# Patient Record
Sex: FEMALE | Race: WHITE | Employment: OTHER | URBAN - METROPOLITAN AREA
[De-identification: names, ages, dates, MRNs, and addresses within clinical notes are randomized per-mention and may not be internally consistent; named-entity substitution may affect disease eponyms.]

---

## 2017-04-04 ENCOUNTER — OFFICE VISIT (OUTPATIENT)
Dept: SURGERY | Age: 77
End: 2017-04-04

## 2017-04-04 VITALS
HEIGHT: 60 IN | TEMPERATURE: 97.9 F | RESPIRATION RATE: 14 BRPM | DIASTOLIC BLOOD PRESSURE: 57 MMHG | WEIGHT: 172 LBS | SYSTOLIC BLOOD PRESSURE: 113 MMHG | OXYGEN SATURATION: 96 % | BODY MASS INDEX: 33.77 KG/M2 | HEART RATE: 68 BPM

## 2017-04-04 DIAGNOSIS — K42.0 RECURRENT UMBILICAL HERNIA WITH INCARCERATION: Primary | ICD-10-CM

## 2017-04-04 PROBLEM — F41.9 ANXIETY: Status: RESOLVED | Noted: 2017-04-04 | Resolved: 2017-04-04

## 2017-04-04 PROBLEM — I10 HTN (HYPERTENSION): Status: ACTIVE | Noted: 2017-04-04

## 2017-04-04 PROBLEM — E03.9 HYPOTHYROID: Status: ACTIVE | Noted: 2017-04-04

## 2017-04-04 PROBLEM — K21.9 GERD (GASTROESOPHAGEAL REFLUX DISEASE): Status: ACTIVE | Noted: 2017-04-04

## 2017-04-04 PROBLEM — E11.9 TYPE II DIABETES MELLITUS (HCC): Status: ACTIVE | Noted: 2017-04-04

## 2017-04-04 PROBLEM — F41.9 ANXIETY: Status: ACTIVE | Noted: 2017-04-04

## 2017-04-04 PROBLEM — E66.9 OBESITY (BMI 30.0-34.9): Status: ACTIVE | Noted: 2017-04-04

## 2017-04-04 PROBLEM — E78.00 HYPERCHOLESTEREMIA: Status: ACTIVE | Noted: 2017-04-04

## 2017-04-04 RX ORDER — ATENOLOL 50 MG/1
TABLET ORAL DAILY
COMMUNITY
End: 2017-04-11

## 2017-04-04 RX ORDER — GLIPIZIDE 10 MG/1
10 TABLET ORAL 2 TIMES DAILY
COMMUNITY
End: 2020-08-24

## 2017-04-04 RX ORDER — SIMVASTATIN 20 MG/1
TABLET, FILM COATED ORAL
COMMUNITY
End: 2017-04-11

## 2017-04-04 RX ORDER — ALPRAZOLAM 0.25 MG/1
TABLET ORAL
COMMUNITY
End: 2017-04-11

## 2017-04-04 RX ORDER — LISINOPRIL 5 MG/1
TABLET ORAL DAILY
COMMUNITY
End: 2017-04-11

## 2017-04-04 RX ORDER — ALLOPURINOL 100 MG/1
TABLET ORAL DAILY
COMMUNITY
End: 2017-04-11

## 2017-04-04 RX ORDER — LEVOTHYROXINE SODIUM 50 UG/1
TABLET ORAL
COMMUNITY
End: 2017-04-11

## 2017-04-04 NOTE — PROGRESS NOTES
Surgery History and Physical    Subjective:      Zaheer Hector is a 66 y.o. white female who presents for evaluation of a hernia. Mrs. Darrian Jang had an open umbilical herniorrhaphy in the past, and she believes that mesh was used. For the past 3 to 5 years, she has had a recurrent hernia. Over the past few weeks, she has had increasing sharp pain, especially after she eats. She has had some nausea recently, but no vomiting. She is moving her bowels normally. The hernia always sticks out, but gets bigger at times. It never completely reduces. Her only other abdominal surgery was an open cholecystectomy. Past Medical History:   Diagnosis Date    Anxiety     Arthritis     Gout.  Diabetes ([facility] [address]     Hypercholesterolemia     Hypertension     Thyroid disease     Hypothyroid. No past surgical history on file. No family history on file. Social History   Substance Use Topics    Smoking status: Not on file    Smokeless tobacco: Not on file    Alcohol use Not on file      Prior to Admission medications    Medication Sig Start Date End Date Taking? Authorizing Provider   lisinopril (PRINIVIL, ZESTRIL) 5 mg tablet Take  by mouth daily. Yes Historical Provider   atenolol (TENORMIN) 50 mg tablet Take  by mouth daily. Yes Historical Provider   glipiZIDE (GLUCOTROL) 10 mg tablet Take 10 mg by mouth two (2) times a day. Yes Historical Provider   SITagliptin (JANUVIA) 50 mg tablet Take 50 mg by mouth daily. Yes Historical Provider   simvastatin (ZOCOR) 20 mg tablet Take  by mouth nightly. Yes Historical Provider   levothyroxine (SYNTHROID) 50 mcg tablet Take  by mouth Daily (before breakfast). Yes Historical Provider   allopurinol (ZYLOPRIM) 100 mg tablet Take  by mouth daily. Yes Historical Provider   ALPRAZolam (XANAX) 0.25 mg tablet Take  by mouth.    Yes Historical Provider      Not on File    Review of Systems:  A comprehensive review of systems was negative except for that written in the History of Present Illness. Objective:      Physical Exam:  GENERAL: alert, cooperative, no distress, appears stated age, EYE: negative findings: anicteric sclera, LYMPHATIC: Cervical, supraclavicular nodes normal. , THROAT & NECK: neck supple and symmetrical.  The thyroid is grossly normal., LUNG: clear to auscultation bilaterally, HEART: regular rate and rhythm, ABDOMEN: Soft, obese, ND. There is a tender incarcerated umbilical hernia. There is a healed infraumbilical surgical scar., EXTREMITIES:  no edema, SKIN: Normal., NEUROLOGIC: negative, PSYCHIATRIC: non focal    Assessment:     Incarcerated, recurrent umbilical hernia without gangrene or obstruction. Plan:     Mrs. Judge Melgar would like to have her hernia repaired, but wants to do it after her cataract procedure next week. I discussed the risks of the procedure including bleeding, infection, wound healing problems, injury to the bowel, recurrence of the hernia, and seroma. She understands the risks; any and all questions were answered to her satisfaction. Mrs. Judge Melgar will be scheduled for an elective robotic-assisted laparoscopic umbilical herniorrhaphy with mesh under general anesthesia. She will be admitted overnight for observation.     Signed By: Viki Novak MD     April 4, 2017

## 2017-04-05 ENCOUNTER — TELEPHONE (OUTPATIENT)
Dept: SURGERY | Age: 77
End: 2017-04-05

## 2017-04-05 NOTE — TELEPHONE ENCOUNTER
Spoke to patient to confirm she is going to have eye surgery tomorrow and she will schedule her surgery with Dr Sam Ramesh for 4/13/17.

## 2017-04-05 NOTE — TELEPHONE ENCOUNTER
Patient said she would like to have her surgery on 4/13/17 she will cancel her eye surgery scheduled for next week. She is going to check with the surgeon to see if its ok for her to have eye surgery tomorrow.  Dr Ezio Sanders notified

## 2017-04-05 NOTE — TELEPHONE ENCOUNTER
Patient called office states she  was in the office yesterday with Dr Renée Moran she would like to schedule her surgery as soon as possible because she is in a lot of pain, she doesn't want to wait

## 2017-04-11 ENCOUNTER — HOSPITAL ENCOUNTER (OUTPATIENT)
Dept: PREADMISSION TESTING | Age: 77
Discharge: HOME | End: 2017-04-11
Payer: COMMERCIAL

## 2017-04-11 VITALS
HEART RATE: 71 BPM | OXYGEN SATURATION: 96 % | RESPIRATION RATE: 16 BRPM | WEIGHT: 173.06 LBS | TEMPERATURE: 98 F | DIASTOLIC BLOOD PRESSURE: 66 MMHG | SYSTOLIC BLOOD PRESSURE: 122 MMHG | BODY MASS INDEX: 33.98 KG/M2 | HEIGHT: 60 IN

## 2017-04-11 LAB
ANION GAP BLD CALC-SCNC: 12 MMOL/L (ref 5–15)
ATRIAL RATE: 65 BPM
BASOPHILS # BLD AUTO: 0 K/UL (ref 0–0.1)
BASOPHILS # BLD: 1 % (ref 0–1)
BUN SERPL-MCNC: 27 MG/DL (ref 6–20)
BUN/CREAT SERPL: 22 (ref 12–20)
CALCIUM SERPL-MCNC: 9.3 MG/DL (ref 8.5–10.1)
CALCULATED P AXIS, ECG09: 42 DEGREES
CALCULATED R AXIS, ECG10: 41 DEGREES
CALCULATED T AXIS, ECG11: 56 DEGREES
CHLORIDE SERPL-SCNC: 105 MMOL/L (ref 97–108)
CO2 SERPL-SCNC: 26 MMOL/L (ref 21–32)
CREAT SERPL-MCNC: 1.23 MG/DL (ref 0.55–1.02)
DIAGNOSIS, 93000: NORMAL
EOSINOPHIL # BLD: 0.3 K/UL (ref 0–0.4)
EOSINOPHIL NFR BLD: 5 % (ref 0–7)
ERYTHROCYTE [DISTWIDTH] IN BLOOD BY AUTOMATED COUNT: 14.4 % (ref 11.5–14.5)
GLUCOSE SERPL-MCNC: 141 MG/DL (ref 65–100)
HCT VFR BLD AUTO: 35.6 % (ref 35–47)
HGB BLD-MCNC: 11.2 G/DL (ref 11.5–16)
LYMPHOCYTES # BLD AUTO: 28 % (ref 12–49)
LYMPHOCYTES # BLD: 2 K/UL (ref 0.8–3.5)
MCH RBC QN AUTO: 31.6 PG (ref 26–34)
MCHC RBC AUTO-ENTMCNC: 31.5 G/DL (ref 30–36.5)
MCV RBC AUTO: 100.6 FL (ref 80–99)
MONOCYTES # BLD: 0.7 K/UL (ref 0–1)
MONOCYTES NFR BLD AUTO: 10 % (ref 5–13)
NEUTS SEG # BLD: 4 K/UL (ref 1.8–8)
NEUTS SEG NFR BLD AUTO: 56 % (ref 32–75)
P-R INTERVAL, ECG05: 178 MS
PLATELET # BLD AUTO: 228 K/UL (ref 150–400)
POTASSIUM SERPL-SCNC: 4.9 MMOL/L (ref 3.5–5.1)
Q-T INTERVAL, ECG07: 404 MS
QRS DURATION, ECG06: 76 MS
QTC CALCULATION (BEZET), ECG08: 420 MS
RBC # BLD AUTO: 3.54 M/UL (ref 3.8–5.2)
SODIUM SERPL-SCNC: 143 MMOL/L (ref 136–145)
VENTRICULAR RATE, ECG03: 65 BPM
WBC # BLD AUTO: 7.2 K/UL (ref 3.6–11)

## 2017-04-11 PROCEDURE — 85025 COMPLETE CBC W/AUTO DIFF WBC: CPT | Performed by: SURGERY

## 2017-04-11 PROCEDURE — 36415 COLL VENOUS BLD VENIPUNCTURE: CPT | Performed by: SURGERY

## 2017-04-11 PROCEDURE — 93005 ELECTROCARDIOGRAM TRACING: CPT

## 2017-04-11 PROCEDURE — 80048 BASIC METABOLIC PNL TOTAL CA: CPT | Performed by: SURGERY

## 2017-04-11 RX ORDER — FAMOTIDINE 10 MG/1
10 TABLET ORAL
COMMUNITY
End: 2021-02-08

## 2017-04-11 RX ORDER — ASPIRIN 81 MG/1
81 TABLET ORAL
Status: ON HOLD | COMMUNITY
End: 2021-02-10 | Stop reason: SDUPTHER

## 2017-04-11 RX ORDER — OMEPRAZOLE 20 MG/1
20 CAPSULE, DELAYED RELEASE ORAL DAILY
COMMUNITY
End: 2021-02-08

## 2017-04-11 RX ORDER — NAPROXEN 500 MG/1
500 TABLET ORAL AS NEEDED
COMMUNITY
End: 2021-02-08

## 2017-04-11 RX ORDER — PREDNISOLONE ACETATE 10 MG/ML
1 SUSPENSION/ DROPS OPHTHALMIC 4 TIMES DAILY
COMMUNITY
End: 2020-08-19

## 2017-04-12 ENCOUNTER — ANESTHESIA EVENT (OUTPATIENT)
Dept: SURGERY | Age: 77
End: 2017-04-12
Payer: COMMERCIAL

## 2017-04-13 ENCOUNTER — SURGERY (OUTPATIENT)
Age: 77
End: 2017-04-13

## 2017-04-13 ENCOUNTER — HOSPITAL ENCOUNTER (OUTPATIENT)
Age: 77
Setting detail: OBSERVATION
Discharge: HOME | End: 2017-04-14
Attending: SURGERY | Admitting: SURGERY
Payer: COMMERCIAL

## 2017-04-13 ENCOUNTER — ANESTHESIA (OUTPATIENT)
Dept: SURGERY | Age: 77
End: 2017-04-13
Payer: COMMERCIAL

## 2017-04-13 DIAGNOSIS — Z98.890 S/P LAPAROSCOPIC HERNIA REPAIR: ICD-10-CM

## 2017-04-13 DIAGNOSIS — Z87.19 S/P LAPAROSCOPIC HERNIA REPAIR: ICD-10-CM

## 2017-04-13 DIAGNOSIS — K42.0 RECURRENT UMBILICAL HERNIA WITH INCARCERATION: ICD-10-CM

## 2017-04-13 LAB
GLUCOSE BLD STRIP.AUTO-MCNC: 138 MG/DL (ref 65–100)
GLUCOSE BLD STRIP.AUTO-MCNC: 145 MG/DL (ref 65–100)
SERVICE CMNT-IMP: ABNORMAL
SERVICE CMNT-IMP: ABNORMAL

## 2017-04-13 PROCEDURE — 77030003580 HC NDL INSUF VERES J&J -B: Performed by: SURGERY

## 2017-04-13 PROCEDURE — 76010000876 HC OR TIME 2 TO 2.5HR INTENSV - TIER 2: Performed by: SURGERY

## 2017-04-13 PROCEDURE — 74011000258 HC RX REV CODE- 258: Performed by: SURGERY

## 2017-04-13 PROCEDURE — 77030026438 HC STYL ET INTUB CARD -A: Performed by: ANESTHESIOLOGY

## 2017-04-13 PROCEDURE — 74011250637 HC RX REV CODE- 250/637: Performed by: SURGERY

## 2017-04-13 PROCEDURE — 77030016151 HC PROTCTR LNS DFOG COVD -B: Performed by: SURGERY

## 2017-04-13 PROCEDURE — 76060000035 HC ANESTHESIA 2 TO 2.5 HR: Performed by: SURGERY

## 2017-04-13 PROCEDURE — 76210000016 HC OR PH I REC 1 TO 1.5 HR: Performed by: SURGERY

## 2017-04-13 PROCEDURE — 77030031139 HC SUT VCRL2 J&J -A: Performed by: SURGERY

## 2017-04-13 PROCEDURE — 77030032490 HC SLV COMPR SCD KNE COVD -B: Performed by: SURGERY

## 2017-04-13 PROCEDURE — 77030018836 HC SOL IRR NACL ICUM -A: Performed by: SURGERY

## 2017-04-13 PROCEDURE — C1781 MESH (IMPLANTABLE): HCPCS | Performed by: SURGERY

## 2017-04-13 PROCEDURE — 74011000250 HC RX REV CODE- 250: Performed by: SURGERY

## 2017-04-13 PROCEDURE — 77030013474 HC CRD BPLR DISP ADLR -A: Performed by: SURGERY

## 2017-04-13 PROCEDURE — 77030011640 HC PAD GRND REM COVD -A: Performed by: SURGERY

## 2017-04-13 PROCEDURE — 74011250636 HC RX REV CODE- 250/636

## 2017-04-13 PROCEDURE — 77030013079 HC BLNKT BAIR HGGR 3M -A: Performed by: ANESTHESIOLOGY

## 2017-04-13 PROCEDURE — 77030033067 HC SUT PDO STRATFX SPIR J&J -B: Performed by: SURGERY

## 2017-04-13 PROCEDURE — 77030035277 HC OBTRTR BLDELSS DISP INTU -B: Performed by: SURGERY

## 2017-04-13 PROCEDURE — 99218 HC RM OBSERVATION: CPT

## 2017-04-13 PROCEDURE — 77030008684 HC TU ET CUF COVD -B: Performed by: ANESTHESIOLOGY

## 2017-04-13 PROCEDURE — 88304 TISSUE EXAM BY PATHOLOGIST: CPT | Performed by: SURGERY

## 2017-04-13 PROCEDURE — 74011250636 HC RX REV CODE- 250/636: Performed by: SURGERY

## 2017-04-13 PROCEDURE — 77030018673: Performed by: SURGERY

## 2017-04-13 PROCEDURE — 82962 GLUCOSE BLOOD TEST: CPT

## 2017-04-13 PROCEDURE — 74011250636 HC RX REV CODE- 250/636: Performed by: ANESTHESIOLOGY

## 2017-04-13 PROCEDURE — 77030002933 HC SUT MCRYL J&J -A: Performed by: SURGERY

## 2017-04-13 PROCEDURE — 74011000250 HC RX REV CODE- 250

## 2017-04-13 PROCEDURE — 77010033678 HC OXYGEN DAILY

## 2017-04-13 PROCEDURE — 77030020782 HC GWN BAIR PAWS FLX 3M -B

## 2017-04-13 PROCEDURE — 77030008557 HC TBNG SMK EVAC STOR -B: Performed by: SURGERY

## 2017-04-13 PROCEDURE — 77030037032 HC INSRT SCIS CLICKLLINE DISP STOR -B: Performed by: SURGERY

## 2017-04-13 PROCEDURE — 77030036554: Performed by: SURGERY

## 2017-04-13 PROCEDURE — 77030035048 HC TRCR ENDOSC OPTCL COVD -B: Performed by: SURGERY

## 2017-04-13 PROCEDURE — C9290 INJ, BUPIVACAINE LIPOSOME: HCPCS | Performed by: SURGERY

## 2017-04-13 DEVICE — MESH HERN CIRCLE 4.5IN -- VENTRALIGHT S/T: Type: IMPLANTABLE DEVICE | Site: UMBILICAL | Status: FUNCTIONAL

## 2017-04-13 RX ORDER — NEOSTIGMINE METHYLSULFATE 1 MG/ML
INJECTION INTRAVENOUS AS NEEDED
Status: DISCONTINUED | OUTPATIENT
Start: 2017-04-13 | End: 2017-04-13 | Stop reason: HOSPADM

## 2017-04-13 RX ORDER — ROCURONIUM BROMIDE 10 MG/ML
INJECTION, SOLUTION INTRAVENOUS AS NEEDED
Status: DISCONTINUED | OUTPATIENT
Start: 2017-04-13 | End: 2017-04-13 | Stop reason: HOSPADM

## 2017-04-13 RX ORDER — SODIUM CHLORIDE 0.9 % (FLUSH) 0.9 %
5-10 SYRINGE (ML) INJECTION EVERY 8 HOURS
Status: DISCONTINUED | OUTPATIENT
Start: 2017-04-13 | End: 2017-04-14 | Stop reason: HOSPADM

## 2017-04-13 RX ORDER — ALPRAZOLAM 0.25 MG/1
0.25 TABLET ORAL
Status: DISCONTINUED | OUTPATIENT
Start: 2017-04-13 | End: 2017-04-14 | Stop reason: HOSPADM

## 2017-04-13 RX ORDER — NALOXONE HYDROCHLORIDE 0.4 MG/ML
0.4 INJECTION, SOLUTION INTRAMUSCULAR; INTRAVENOUS; SUBCUTANEOUS AS NEEDED
Status: DISCONTINUED | OUTPATIENT
Start: 2017-04-13 | End: 2017-04-14 | Stop reason: HOSPADM

## 2017-04-13 RX ORDER — ATENOLOL 25 MG/1
50 TABLET ORAL DAILY
Status: DISCONTINUED | OUTPATIENT
Start: 2017-04-14 | End: 2017-04-14 | Stop reason: HOSPADM

## 2017-04-13 RX ORDER — FENTANYL CITRATE 50 UG/ML
INJECTION, SOLUTION INTRAMUSCULAR; INTRAVENOUS AS NEEDED
Status: DISCONTINUED | OUTPATIENT
Start: 2017-04-13 | End: 2017-04-13 | Stop reason: HOSPADM

## 2017-04-13 RX ORDER — SODIUM CHLORIDE, SODIUM LACTATE, POTASSIUM CHLORIDE, CALCIUM CHLORIDE 600; 310; 30; 20 MG/100ML; MG/100ML; MG/100ML; MG/100ML
75 INJECTION, SOLUTION INTRAVENOUS CONTINUOUS
Status: DISCONTINUED | OUTPATIENT
Start: 2017-04-13 | End: 2017-04-14 | Stop reason: HOSPADM

## 2017-04-13 RX ORDER — PREDNISOLONE ACETATE 10 MG/ML
1 SUSPENSION/ DROPS OPHTHALMIC 4 TIMES DAILY
Status: DISCONTINUED | OUTPATIENT
Start: 2017-04-13 | End: 2017-04-14 | Stop reason: HOSPADM

## 2017-04-13 RX ORDER — SODIUM CHLORIDE, SODIUM LACTATE, POTASSIUM CHLORIDE, CALCIUM CHLORIDE 600; 310; 30; 20 MG/100ML; MG/100ML; MG/100ML; MG/100ML
125 INJECTION, SOLUTION INTRAVENOUS CONTINUOUS
Status: DISCONTINUED | OUTPATIENT
Start: 2017-04-13 | End: 2017-04-13 | Stop reason: HOSPADM

## 2017-04-13 RX ORDER — LIDOCAINE HYDROCHLORIDE 20 MG/ML
INJECTION, SOLUTION EPIDURAL; INFILTRATION; INTRACAUDAL; PERINEURAL AS NEEDED
Status: DISCONTINUED | OUTPATIENT
Start: 2017-04-13 | End: 2017-04-13 | Stop reason: HOSPADM

## 2017-04-13 RX ORDER — DIPHENHYDRAMINE HYDROCHLORIDE 50 MG/ML
12.5 INJECTION, SOLUTION INTRAMUSCULAR; INTRAVENOUS AS NEEDED
Status: DISCONTINUED | OUTPATIENT
Start: 2017-04-13 | End: 2017-04-13 | Stop reason: HOSPADM

## 2017-04-13 RX ORDER — SODIUM CHLORIDE, SODIUM LACTATE, POTASSIUM CHLORIDE, CALCIUM CHLORIDE 600; 310; 30; 20 MG/100ML; MG/100ML; MG/100ML; MG/100ML
100 INJECTION, SOLUTION INTRAVENOUS CONTINUOUS
Status: DISCONTINUED | OUTPATIENT
Start: 2017-04-13 | End: 2017-04-13 | Stop reason: HOSPADM

## 2017-04-13 RX ORDER — ONDANSETRON 2 MG/ML
4 INJECTION INTRAMUSCULAR; INTRAVENOUS
Status: DISCONTINUED | OUTPATIENT
Start: 2017-04-13 | End: 2017-04-14 | Stop reason: HOSPADM

## 2017-04-13 RX ORDER — GLIPIZIDE 5 MG/1
10 TABLET ORAL 2 TIMES DAILY WITH MEALS
Status: DISCONTINUED | OUTPATIENT
Start: 2017-04-14 | End: 2017-04-14 | Stop reason: HOSPADM

## 2017-04-13 RX ORDER — CEFAZOLIN SODIUM IN 0.9 % NACL 2 G/50 ML
2 INTRAVENOUS SOLUTION, PIGGYBACK (ML) INTRAVENOUS ONCE
Status: DISCONTINUED | OUTPATIENT
Start: 2017-04-13 | End: 2017-04-13 | Stop reason: HOSPADM

## 2017-04-13 RX ORDER — OXYCODONE AND ACETAMINOPHEN 5; 325 MG/1; MG/1
1 TABLET ORAL
Status: DISCONTINUED | OUTPATIENT
Start: 2017-04-13 | End: 2017-04-14 | Stop reason: HOSPADM

## 2017-04-13 RX ORDER — SODIUM CHLORIDE 0.9 % (FLUSH) 0.9 %
5-10 SYRINGE (ML) INJECTION AS NEEDED
Status: DISCONTINUED | OUTPATIENT
Start: 2017-04-13 | End: 2017-04-14 | Stop reason: HOSPADM

## 2017-04-13 RX ORDER — SODIUM CHLORIDE 0.9 % (FLUSH) 0.9 %
5-10 SYRINGE (ML) INJECTION EVERY 8 HOURS
Status: DISCONTINUED | OUTPATIENT
Start: 2017-04-13 | End: 2017-04-13 | Stop reason: HOSPADM

## 2017-04-13 RX ORDER — FAMOTIDINE 10 MG/ML
INJECTION INTRAVENOUS AS NEEDED
Status: DISCONTINUED | OUTPATIENT
Start: 2017-04-13 | End: 2017-04-13 | Stop reason: HOSPADM

## 2017-04-13 RX ORDER — BUPIVACAINE HYDROCHLORIDE 5 MG/ML
INJECTION, SOLUTION EPIDURAL; INTRACAUDAL AS NEEDED
Status: DISCONTINUED | OUTPATIENT
Start: 2017-04-13 | End: 2017-04-13 | Stop reason: HOSPADM

## 2017-04-13 RX ORDER — ASPIRIN 81 MG/1
81 TABLET ORAL DAILY
Status: DISCONTINUED | OUTPATIENT
Start: 2017-04-14 | End: 2017-04-14 | Stop reason: HOSPADM

## 2017-04-13 RX ORDER — GLYCOPYRROLATE 0.2 MG/ML
INJECTION INTRAMUSCULAR; INTRAVENOUS AS NEEDED
Status: DISCONTINUED | OUTPATIENT
Start: 2017-04-13 | End: 2017-04-13 | Stop reason: HOSPADM

## 2017-04-13 RX ORDER — SODIUM CHLORIDE, SODIUM LACTATE, POTASSIUM CHLORIDE, CALCIUM CHLORIDE 600; 310; 30; 20 MG/100ML; MG/100ML; MG/100ML; MG/100ML
INJECTION, SOLUTION INTRAVENOUS
Status: DISCONTINUED | OUTPATIENT
Start: 2017-04-13 | End: 2017-04-13 | Stop reason: HOSPADM

## 2017-04-13 RX ORDER — ONDANSETRON 2 MG/ML
INJECTION INTRAMUSCULAR; INTRAVENOUS AS NEEDED
Status: DISCONTINUED | OUTPATIENT
Start: 2017-04-13 | End: 2017-04-13 | Stop reason: HOSPADM

## 2017-04-13 RX ORDER — CEFAZOLIN SODIUM 1 G/3ML
INJECTION, POWDER, FOR SOLUTION INTRAMUSCULAR; INTRAVENOUS AS NEEDED
Status: DISCONTINUED | OUTPATIENT
Start: 2017-04-13 | End: 2017-04-13 | Stop reason: HOSPADM

## 2017-04-13 RX ORDER — ONDANSETRON 2 MG/ML
4 INJECTION INTRAMUSCULAR; INTRAVENOUS AS NEEDED
Status: DISCONTINUED | OUTPATIENT
Start: 2017-04-13 | End: 2017-04-13 | Stop reason: HOSPADM

## 2017-04-13 RX ORDER — PROPOFOL 10 MG/ML
INJECTION, EMULSION INTRAVENOUS AS NEEDED
Status: DISCONTINUED | OUTPATIENT
Start: 2017-04-13 | End: 2017-04-13 | Stop reason: HOSPADM

## 2017-04-13 RX ORDER — LIDOCAINE HYDROCHLORIDE 10 MG/ML
0.1 INJECTION, SOLUTION EPIDURAL; INFILTRATION; INTRACAUDAL; PERINEURAL AS NEEDED
Status: DISCONTINUED | OUTPATIENT
Start: 2017-04-13 | End: 2017-04-13 | Stop reason: HOSPADM

## 2017-04-13 RX ORDER — ENOXAPARIN SODIUM 100 MG/ML
40 INJECTION SUBCUTANEOUS EVERY 24 HOURS
Status: DISCONTINUED | OUTPATIENT
Start: 2017-04-13 | End: 2017-04-14 | Stop reason: HOSPADM

## 2017-04-13 RX ORDER — SODIUM CHLORIDE 0.9 % (FLUSH) 0.9 %
5-10 SYRINGE (ML) INJECTION AS NEEDED
Status: DISCONTINUED | OUTPATIENT
Start: 2017-04-13 | End: 2017-04-13 | Stop reason: HOSPADM

## 2017-04-13 RX ORDER — LISINOPRIL 5 MG/1
5 TABLET ORAL
Status: DISCONTINUED | OUTPATIENT
Start: 2017-04-13 | End: 2017-04-14 | Stop reason: HOSPADM

## 2017-04-13 RX ORDER — HYDROMORPHONE HYDROCHLORIDE 2 MG/ML
INJECTION, SOLUTION INTRAMUSCULAR; INTRAVENOUS; SUBCUTANEOUS AS NEEDED
Status: DISCONTINUED | OUTPATIENT
Start: 2017-04-13 | End: 2017-04-13 | Stop reason: HOSPADM

## 2017-04-13 RX ORDER — THERA TABS 400 MCG
1 TAB ORAL DAILY
Status: DISCONTINUED | OUTPATIENT
Start: 2017-04-14 | End: 2017-04-14 | Stop reason: HOSPADM

## 2017-04-13 RX ORDER — HYDROMORPHONE HYDROCHLORIDE 1 MG/ML
1 INJECTION, SOLUTION INTRAMUSCULAR; INTRAVENOUS; SUBCUTANEOUS
Status: DISCONTINUED | OUTPATIENT
Start: 2017-04-13 | End: 2017-04-14 | Stop reason: HOSPADM

## 2017-04-13 RX ORDER — SIMVASTATIN 20 MG/1
20 TABLET, FILM COATED ORAL
Status: DISCONTINUED | OUTPATIENT
Start: 2017-04-13 | End: 2017-04-14 | Stop reason: HOSPADM

## 2017-04-13 RX ORDER — ALLOPURINOL 100 MG/1
200 TABLET ORAL DAILY
Status: DISCONTINUED | OUTPATIENT
Start: 2017-04-14 | End: 2017-04-14 | Stop reason: HOSPADM

## 2017-04-13 RX ORDER — FENTANYL CITRATE 50 UG/ML
INJECTION, SOLUTION INTRAMUSCULAR; INTRAVENOUS AS NEEDED
Status: DISCONTINUED | OUTPATIENT
Start: 2017-04-13 | End: 2017-04-13

## 2017-04-13 RX ORDER — LEVOTHYROXINE SODIUM 50 UG/1
50 TABLET ORAL
Status: DISCONTINUED | OUTPATIENT
Start: 2017-04-14 | End: 2017-04-14 | Stop reason: HOSPADM

## 2017-04-13 RX ORDER — FAMOTIDINE 20 MG/1
10 TABLET, FILM COATED ORAL
Status: DISCONTINUED | OUTPATIENT
Start: 2017-04-13 | End: 2017-04-14 | Stop reason: HOSPADM

## 2017-04-13 RX ORDER — MIDAZOLAM HYDROCHLORIDE 1 MG/ML
INJECTION, SOLUTION INTRAMUSCULAR; INTRAVENOUS AS NEEDED
Status: DISCONTINUED | OUTPATIENT
Start: 2017-04-13 | End: 2017-04-13 | Stop reason: HOSPADM

## 2017-04-13 RX ORDER — ACETAMINOPHEN 325 MG/1
650 TABLET ORAL
Status: DISCONTINUED | OUTPATIENT
Start: 2017-04-13 | End: 2017-04-14 | Stop reason: HOSPADM

## 2017-04-13 RX ORDER — HYDROMORPHONE HYDROCHLORIDE 1 MG/ML
.25-1 INJECTION, SOLUTION INTRAMUSCULAR; INTRAVENOUS; SUBCUTANEOUS
Status: DISCONTINUED | OUTPATIENT
Start: 2017-04-13 | End: 2017-04-13 | Stop reason: HOSPADM

## 2017-04-13 RX ADMIN — GLYCOPYRROLATE 0.4 MG: 0.2 INJECTION INTRAMUSCULAR; INTRAVENOUS at 15:53

## 2017-04-13 RX ADMIN — PROPOFOL 140 MG: 10 INJECTION, EMULSION INTRAVENOUS at 14:07

## 2017-04-13 RX ADMIN — Medication 10 ML: at 21:43

## 2017-04-13 RX ADMIN — LIDOCAINE HYDROCHLORIDE 100 MG: 20 INJECTION, SOLUTION EPIDURAL; INFILTRATION; INTRACAUDAL; PERINEURAL at 14:07

## 2017-04-13 RX ADMIN — ROCURONIUM BROMIDE 35 MG: 10 INJECTION, SOLUTION INTRAVENOUS at 14:08

## 2017-04-13 RX ADMIN — SODIUM CHLORIDE, SODIUM LACTATE, POTASSIUM CHLORIDE, AND CALCIUM CHLORIDE: 600; 310; 30; 20 INJECTION, SOLUTION INTRAVENOUS at 14:32

## 2017-04-13 RX ADMIN — HYDROMORPHONE HYDROCHLORIDE 0.5 MG: 2 INJECTION, SOLUTION INTRAMUSCULAR; INTRAVENOUS; SUBCUTANEOUS at 15:58

## 2017-04-13 RX ADMIN — ENOXAPARIN SODIUM 40 MG: 40 INJECTION SUBCUTANEOUS at 21:43

## 2017-04-13 RX ADMIN — FENTANYL CITRATE 150 MCG: 50 INJECTION, SOLUTION INTRAMUSCULAR; INTRAVENOUS at 14:07

## 2017-04-13 RX ADMIN — BUPIVACAINE 30 ML: 13.3 INJECTION, SUSPENSION, LIPOSOMAL INFILTRATION at 15:51

## 2017-04-13 RX ADMIN — MIDAZOLAM HYDROCHLORIDE 2 MG: 1 INJECTION, SOLUTION INTRAMUSCULAR; INTRAVENOUS at 13:58

## 2017-04-13 RX ADMIN — HYDROMORPHONE HYDROCHLORIDE 0.5 MG: 1 INJECTION, SOLUTION INTRAMUSCULAR; INTRAVENOUS; SUBCUTANEOUS at 17:04

## 2017-04-13 RX ADMIN — SODIUM CHLORIDE, SODIUM LACTATE, POTASSIUM CHLORIDE, AND CALCIUM CHLORIDE: 600; 310; 30; 20 INJECTION, SOLUTION INTRAVENOUS at 15:30

## 2017-04-13 RX ADMIN — FENTANYL CITRATE 50 MCG: 50 INJECTION, SOLUTION INTRAMUSCULAR; INTRAVENOUS at 14:52

## 2017-04-13 RX ADMIN — SITAGLIPTIN 50 MG: 25 TABLET, FILM COATED ORAL at 21:43

## 2017-04-13 RX ADMIN — CEFAZOLIN SODIUM 2 G: 1 INJECTION, POWDER, FOR SOLUTION INTRAMUSCULAR; INTRAVENOUS at 14:16

## 2017-04-13 RX ADMIN — SIMVASTATIN 20 MG: 20 TABLET, FILM COATED ORAL at 21:43

## 2017-04-13 RX ADMIN — ROCURONIUM BROMIDE 5 MG: 10 INJECTION, SOLUTION INTRAVENOUS at 14:07

## 2017-04-13 RX ADMIN — ONDANSETRON 4 MG: 2 INJECTION INTRAMUSCULAR; INTRAVENOUS at 14:20

## 2017-04-13 RX ADMIN — FAMOTIDINE 20 MG: 10 INJECTION INTRAVENOUS at 13:57

## 2017-04-13 RX ADMIN — BUPIVACAINE HYDROCHLORIDE 22 ML: 5 INJECTION, SOLUTION EPIDURAL; INTRACAUDAL; PERINEURAL at 14:30

## 2017-04-13 RX ADMIN — SODIUM CHLORIDE, SODIUM LACTATE, POTASSIUM CHLORIDE, CALCIUM CHLORIDE: 600; 310; 30; 20 INJECTION, SOLUTION INTRAVENOUS at 12:30

## 2017-04-13 RX ADMIN — FENTANYL CITRATE 50 MCG: 50 INJECTION, SOLUTION INTRAMUSCULAR; INTRAVENOUS at 14:41

## 2017-04-13 RX ADMIN — FAMOTIDINE 10 MG: 20 TABLET, FILM COATED ORAL at 21:43

## 2017-04-13 RX ADMIN — NEOSTIGMINE METHYLSULFATE 2 MG: 1 INJECTION INTRAVENOUS at 15:53

## 2017-04-13 RX ADMIN — SODIUM CHLORIDE, SODIUM LACTATE, POTASSIUM CHLORIDE, AND CALCIUM CHLORIDE 100 ML/HR: 600; 310; 30; 20 INJECTION, SOLUTION INTRAVENOUS at 11:30

## 2017-04-13 RX ADMIN — SODIUM CHLORIDE, SODIUM LACTATE, POTASSIUM CHLORIDE, CALCIUM CHLORIDE: 600; 310; 30; 20 INJECTION, SOLUTION INTRAVENOUS at 15:30

## 2017-04-13 RX ADMIN — ONDANSETRON 4 MG: 2 INJECTION INTRAMUSCULAR; INTRAVENOUS at 15:47

## 2017-04-13 RX ADMIN — LISINOPRIL 5 MG: 5 TABLET ORAL at 21:43

## 2017-04-13 RX ADMIN — SODIUM CHLORIDE, SODIUM LACTATE, POTASSIUM CHLORIDE, AND CALCIUM CHLORIDE 75 ML/HR: 600; 310; 30; 20 INJECTION, SOLUTION INTRAVENOUS at 16:32

## 2017-04-13 NOTE — IP AVS SNAPSHOT
[address] 
[phone] Patient: Melida Morris MRN: [id number] :[id number] You are allergic to the following Allergen Reactions Shellfish Derived Other (comments) \"causes my gout to act up, not an allergy\" Recent Documentation Height Weight Breastfeeding? BMI OB Status Smoking Status 1.524 m 77.3 kg No 33.28 kg/m2 Postmenopausal Never Smoker Emergency Contacts Name Discharge Info Relation Home Work Mobile JonesPerez DISCHARGE CAREGIVER [3] Son [22]   [phone] About your hospitalization You were admitted on:  2017 You last received care in the:  [facility] 4M POST SURG ORT 2 You were discharged on:  2017 Unit phone number:  [phone] Why you were hospitalized Your primary diagnosis was:  Not on File Your diagnoses also included:  S/P Laparoscopic Hernia Repair Providers Seen During Your Hospitalizations Provider Role Specialty Primary office phone Vivian Acevedo MD Attending Provider Surgery [phone] Your Primary Care Physician (PCP) Primary Care Physician Office Phone Office Fax [facility] [phone] Follow-up Information Follow up With Details Comments Contact Info Cruz Rosen, [address] [address] [place] [address] 
[phone] Your Appointments 2017  1:45 PM EDT Any with Vivian Acevedo MD  
[address] [facility] [address]) [address]  
[phone] Current Discharge Medication List  
  
START taking these medications Dose & Instructions Dispensing Information Comments Morning Noon Evening Bedtime  
 oxyCODONE-acetaminophen 5-325 mg per tablet Commonly known as:  PERCOCET  
   
 Your last dose was: Your next dose is:    
   
   
 Dose:  1 Tab Take 1 Tab by mouth every four (4) hours as needed. Max Daily Amount: 6 Tabs. Quantity:  30 Tab Refills:  0 CONTINUE these medications which have NOT CHANGED Dose & Instructions Dispensing Information Comments Morning Noon Evening Bedtime  
 allopurinol 100 mg tablet Commonly known as:  Josie Gil Your last dose was: Your next dose is:    
   
   
 Dose:  200 mg Take 200 mg by mouth daily. Refills:  0 ALPRAZolam 0.25 mg tablet Commonly known as:  Ezzie Helena Your last dose was: Your next dose is:    
   
   
 Dose:  0.25 mg Take 0.25 mg by mouth nightly as needed for Anxiety. Take 1/2 to 1 tablet by mouth at bedtime as needed for emotional upset   Indications: ANXIETY Refills:  0  
     
   
   
   
  
 aspirin delayed-release 81 mg tablet Your last dose was: Your next dose is: Take  by mouth daily. Refills:  0  
     
   
   
   
  
 aspirin-acetaminophen-caffeine 250-250-65 mg per tablet Commonly known as:  EXCEDRIN ES Your last dose was: Your next dose is:    
   
   
 Dose:  1 Tab Take 1 Tab by mouth. Refills:  0  
     
   
   
   
  
 atenolol 50 mg tablet Commonly known as:  TENORMIN Your last dose was: Your next dose is:    
   
   
 Dose:  50 mg Take 50 mg by mouth daily. Refills:  0  
     
   
   
   
  
 coenzyme Q-10 200 mg capsule Commonly known as:  CO Q-10 Your last dose was: Your next dose is:    
   
   
 Dose:  200 mg Take 200 mg by mouth daily. Refills:  0  
     
   
   
   
  
 glipiZIDE 10 mg tablet Commonly known as:  Brian Heard Your last dose was: Your next dose is:    
   
   
 Dose:  10 mg Take 10 mg by mouth two (2) times a day. Refills:  0  
     
   
   
   
  
 ibuprofen 200 mg tablet Commonly known as:  MOTRIN Your last dose was: Your next dose is:    
   
   
 Dose:  200 mg Take 200 mg by mouth two (2) times a day. Refills:  0  
     
   
   
   
  
 ppjyxmm-rvklxqfae-mdrpbzlgkpkr -325 mg capsule Commonly known as:  Ashtyn Fam Your last dose was: Your next dose is:    
   
   
 Dose:  1 Cap Take 1 Cap by mouth four (4) times daily as needed. Refills:  0  
     
   
   
   
  
 levothyroxine 50 mcg tablet Commonly known as:  SYNTHROID Your last dose was: Your next dose is:    
   
   
 Dose:  50 mcg Take 50 mcg by mouth Daily (before breakfast). Refills:  0  
     
   
   
   
  
 lisinopril 5 mg tablet Commonly known as:  Solitario Leaver Your last dose was: Your next dose is:    
   
   
 Dose:  5 mg Take 5 mg by mouth nightly. Refills:  0  
     
   
   
   
  
 multivitamin tablet Commonly known as:  ONE A DAY Your last dose was: Your next dose is:    
   
   
 Dose:  1 Tab Take 1 Tab by mouth daily. Refills:  0  
     
   
   
   
  
 naproxen 500 mg tablet Commonly known as:  NAPROSYN Your last dose was: Your next dose is:    
   
   
 Dose:  500 mg Take 500 mg by mouth as needed. Refills:  0  
     
   
   
   
  
 omeprazole 20 mg capsule Commonly known as:  PRILOSEC Your last dose was: Your next dose is:    
   
   
 Dose:  20 mg Take 20 mg by mouth daily. Refills:  0 PEPCID AC 10 mg tablet Generic drug:  famotidine Your last dose was: Your next dose is:    
   
   
 Dose:  10 mg Take 10 mg by mouth two (2) times daily as needed. Refills:  0  
     
   
   
   
  
 prednisoLONE acetate 1 % ophthalmic suspension Commonly known as:  PRED FORTE Your last dose was: Your next dose is:    
   
   
 Dose:  1 Drop Administer 1 Drop to both eyes four (4) times daily. Refills:  0 PRESERVISION AREDS 2 PO Your last dose was: Your next dose is:    
   
   
 Dose:  2 Tab Take 2 Tabs by mouth daily. Refills:  0  
     
   
   
   
  
 simvastatin 20 mg tablet Commonly known as:  ZOCOR Your last dose was: Your next dose is:    
   
   
 Dose:  20 mg Take 20 mg by mouth nightly. Refills:  0 SITagliptin 50 mg tablet Commonly known as:  [place] [address] Cowboy Your last dose was: Your next dose is:    
   
   
 Dose:  50 mg Take 50 mg by mouth nightly. Refills:  0  
     
   
   
   
  
 vitamin a-vitamin c-vit e-min tablet Commonly known as:  Deirdre Clarity Your last dose was: Your next dose is:    
   
   
 Dose:  1 Tab Take 1 Tab by mouth daily. Refills:  0 Where to Get Your Medications Information on where to get these meds will be given to you by the nurse or doctor. ! Ask your nurse or doctor about these medications  
  oxyCODONE-acetaminophen 5-325 mg per tablet Discharge Instructions Abdominal Hernia Repair: What to Expect at Home Your Recovery After surgery to repair your hernia, you are likely to have pain for a few days. You may also feel like you have the flu, and you may have a low fever and feel tired and nauseated. This is common. You should feel better after a few days and will probably feel much better in 7 days. For several weeks you may feel twinges or pulling in the hernia repair when you move. You may have some bruising around the area of your hernia repair. This is normal. 
This care sheet gives you a general idea about how long it will take for you to recover, but each person recovers at a different pace. Follow the steps below to get better as quickly as possible. How can you care for yourself at home? Activity · Rest when you feel tired. Getting enough sleep will help you recover. · Try to walk each day. Start by walking a little more than you did the day before. Bit by bit, increase the amount you walk. Walking boosts blood flow and helps prevent pneumonia and constipation. · If your doctor gives you an abdominal binder to wear, use it as directed. This is an elastic bandage that wraps around your belly and upper hips. It helps support your belly muscles after surgery. · Avoid strenuous activities, such as biking, jogging, weight lifting, or aerobic exercise, until your doctor says it is okay. · Avoid lifting anything over 30 pounds or that would make you strain for 6 weeks! This may include heavy grocery bags and milk containers, a heavy briefcase or backpack, cat litter or dog food bags, a vacuum , or a child. · Ask your doctor when you can drive again. · Most people are able to return to work within 1 to 2 weeks after surgery, but if your job requires that you to do heavy lifting or strenuous activity, you may need to take 4 to 6 weeks off from work. · You may shower 24 hours after surgery, if your doctor okays it. Pat the cuts (incisions) dry. Do not take a bath for the first 2 weeks and until after seen by your doctor . Diet · You can eat your normal diet. If your stomach is upset, try bland, low-fat foods like plain rice, broiled chicken, toast, and yogurt. · Drink plenty of fluids (unless your doctor tells you not to). · You may notice that your bowel movements are not regular right after your surgery. This is common. Avoid constipation and straining with bowel movements. You may want to take a fiber supplement every day. If you have not had a bowel movement after a couple of days, ask your doctor about taking a mild laxative. Medicines · You can restart your medicines. Your doctor will also give you instructions about taking any new medicines.  
· If you take blood thinners, such as warfarin (Coumadin), be sure to talk to your doctor. Your doctor will tell you if and when to start taking those medicines again. Make sure that you understand exactly what your doctor wants you to do. · Be safe with medicines. Take pain medicines exactly as directed. ¨ If the doctor gave you a prescription medicine for pain, take it as prescribed. · If you are not taking a prescription pain medicine, ask your doctor if you can take an over-the-counter medicine. · If you think your pain medicine is making you sick to your stomach: 
¨ Take your medicine after meals (unless your doctor has told you not to). ¨ Ask your doctor for a different pain medicine. Incision care · Remove your bandages on Saturday, 4/15. You may leave your incisions uncovered. · If you have strips of tape on the cut (incision) the doctor made, leave the tape on for a week or until it falls off. · Wash the area daily with warm, soapy water, and pat it dry. Don't use hydrogen peroxide or alcohol, which can slow healing. You may cover the area with a gauze bandage if it weeps or rubs against clothing. Change the bandage every day. · Keep the incisions clean and dry. Other instructions · Hold a pillow over your incision when you cough or take deep breaths. This will support your belly and decrease your pain. · Do breathing exercises at home as instructed by your doctor. This will help prevent pneumonia. · If you had laparoscopic surgery, you may also have pain in your left shoulder. The pain usually lasts about a day or two. Follow-up care is a key part of your treatment and safety. Be sure to make and go to all appointments, and call your doctor if you are having problems. It's also a good idea to know your test results and keep a list of the medicines you take. When should you call for help? Call 911 anytime you think you may need emergency care. For example, call if: 
· You passed out (lost consciousness). · You have sudden chest pain and shortness of breath, or you cough up blood. · You have severe pain in your belly. Call your doctor now or seek immediate medical care if: 
· You are sick to your stomach and cannot keep fluids down. · You have signs of a blood clot, such as: 
¨ Pain in your calf, back of the knee, thigh, or groin. ¨ Redness and swelling in your leg or groin. · You have signs of infection, such as: 
¨ Increased pain, swelling, warmth, or redness. ¨ Red streaks leading from the incision. ¨ Pus draining from the incision. ¨ A fever > 101. · You have trouble passing urine or stool, especially if you have mild pain or swelling in your lower belly. · Bright red blood has soaked through the bandage over your incision. Watch closely for changes in your health, and be sure to contact your doctor if: 
· Your swelling is getting worse. · Your swelling is not going down. · You still don't have a bowel movement after taking a laxative. Where can you learn more? Go to http://radha-jagdish.info/. Enter B577 in the search box to learn more about \"Abdominal Hernia Repair: What to Expect at Home. \" Current as of: August 9, 2016 Content Version: 11.2 © 8563-5341 [employer]. Care instructions adapted under license by [employer] (which disclaims liability or warranty for this information). If you have questions about a medical condition or this instruction, always ask your healthcare professional. [place] [address] any warranty or liability for your use of this information. Wero Perkins. India Elliott MD, FACS General Surgery at [address] [place] [address], [address] 
[phone] Fax [phone] Discharge Orders None Introducing [facility]!    
 [employer] introduces [employer] patient portal. Now you can access parts of your medical record, email your doctor's office, and request medication refills online. 1. In your internet browser, go to [email or web address] 2. Click on the First Time User? Click Here link in the Sign In box. You will see the New Member Sign Up page. 3. Enter your [employer] Access Code exactly as it appears below. You will not need to use this code after youve completed the sign-up process. If you do not sign up before the expiration date, you must request a new code. · [employer] Access Code: [email or web address] Expires: 7/3/2017 10:53 AM 
 
4. Enter the last four digits of your Social Security Number (xxxx) and Date of Birth (mm/dd/yyyy) as indicated and click Submit. You will be taken to the next sign-up page. 5. Create a [employer] ID. This will be your [employer] login ID and cannot be changed, so think of one that is secure and easy to remember. 6. Create a [employer] password. You can change your password at any time. 7. Enter your Password Reset Question and Answer. This can be used at a later time if you forget your password. 8. Enter your e-mail address. You will receive e-mail notification when new information is available in [address] 9. Click Sign Up. You can now view and download portions of your medical record. 10. Click the Download Summary menu link to download a portable copy of your medical information. If you have questions, please visit the Frequently Asked Questions section of the [employer] website. Remember, [employer] is NOT to be used for urgent needs. For medical emergencies, dial 911. Now available from your iPhone and Android! General Information Please provide this summary of care documentation to your next provider. Patient Signature:  ____________________________________________________________ Date:  ____________________________________________________________  
  
Carmelo Abelson  Provider Signature: ____________________________________________________________ Date:  ____________________________________________________________

## 2017-04-13 NOTE — BRIEF OP NOTE
BRIEF OPERATIVE NOTE    Date of Procedure: 4/13/2017   Preoperative Diagnosis: INCARCERATED RECURRENT UMBILICAL HERNIA   Postoperative Diagnosis: INCARCERATED RECURRENT UMBILICAL HERNIA     Procedure(s):  ROBOTIC ASSISTED LAPAROSCOPIC UMBILICAL HERNIA REPAIR W MESH, REMOVAL OF OLD MESH   Surgeon(s) and Role:     * Darline Negron MD - Primary            Surgical Staff:  Circ-1: Ravinder Ferro RN  Circ-2: Marisol Walls RN  Circ-Relief: Marisol Walls RN  Scrub Tech-1: Luis Kishan  Surg Asst-1: [id number] Staff: Umang Shepherd  Event Time In   Incision Start 1427   Incision Close      Anesthesia: General   Estimated Blood Loss: Less than 25 ml. Specimens:   ID Type Source Tests Collected by Time Destination   1 : OLD MESH Preservative Abdomen  Darline Negron MD 4/13/2017 1545 Pathology      Findings: 1. An approximately 1.5 x 1.5 cm umbilical fascial defect with incarcerated omentum to the left of the mesh., 2. Old mesh partially intact. Complications: None. Implants:   Implant Name Type Inv. Item Serial No.  Lot No. LRB No. Used Action   MESH MICAH [place] 4. 5IN -- VENTRALIGHT S/T - SNA   MESH MICAH [place] 4. 5IN -- VENTRALIGHT S/T NA BARD DAVOL [id number] N/A 1 Implanted       Disposition:  Stable the the RR.

## 2017-04-13 NOTE — INTERVAL H&P NOTE
H&P Update:  [facility] was seen and examined. History and physical has been reviewed. The patient has been examined.  There have been no significant clinical changes since the completion of the originally dated History and Physical.    Signed By: Poly Araiza MD     April 13, 2017 6:56 AM

## 2017-04-13 NOTE — ANESTHESIA PREPROCEDURE EVALUATION
Anesthetic History   No history of anesthetic complications            Review of Systems / Medical History  Patient summary reviewed, nursing notes reviewed and pertinent labs reviewed    Pulmonary  Within defined limits                 Neuro/Psych   Within defined limits           Cardiovascular    Hypertension                   GI/Hepatic/Renal     GERD           Endo/Other    Diabetes  Hypothyroidism  Morbid obesity     Other Findings              Physical Exam    Airway  Mallampati: II  TM Distance: 4 - 6 cm  Neck ROM: normal range of motion   Mouth opening: Normal     Cardiovascular    Rhythm: regular  Rate: normal         Dental  No notable dental hx       Pulmonary  Breath sounds clear to auscultation               Abdominal         Other Findings            Anesthetic Plan    ASA: 2  Anesthesia type: general            Anesthetic plan and risks discussed with: Patient

## 2017-04-13 NOTE — H&P (VIEW-ONLY)
Surgery History and Physical    Subjective:      Yoseph Dickson is a 66 y.o. white female who presents for evaluation of a hernia. Mrs. Ames Speaks had an open umbilical herniorrhaphy in the past, and she believes that mesh was used. For the past 3 to 5 years, she has had a recurrent hernia. Over the past few weeks, she has had increasing sharp pain, especially after she eats. She has had some nausea recently, but no vomiting. She is moving her bowels normally. The hernia always sticks out, but gets bigger at times. It never completely reduces. Her only other abdominal surgery was an open cholecystectomy. Past Medical History:   Diagnosis Date    Anxiety     Arthritis     Gout.  Diabetes ([facility] [address]     Hypercholesterolemia     Hypertension     Thyroid disease     Hypothyroid. No past surgical history on file. No family history on file. Social History   Substance Use Topics    Smoking status: Not on file    Smokeless tobacco: Not on file    Alcohol use Not on file      Prior to Admission medications    Medication Sig Start Date End Date Taking? Authorizing Provider   lisinopril (PRINIVIL, ZESTRIL) 5 mg tablet Take  by mouth daily. Yes Historical Provider   atenolol (TENORMIN) 50 mg tablet Take  by mouth daily. Yes Historical Provider   glipiZIDE (GLUCOTROL) 10 mg tablet Take 10 mg by mouth two (2) times a day. Yes Historical Provider   SITagliptin (JANUVIA) 50 mg tablet Take 50 mg by mouth daily. Yes Historical Provider   simvastatin (ZOCOR) 20 mg tablet Take  by mouth nightly. Yes Historical Provider   levothyroxine (SYNTHROID) 50 mcg tablet Take  by mouth Daily (before breakfast). Yes Historical Provider   allopurinol (ZYLOPRIM) 100 mg tablet Take  by mouth daily. Yes Historical Provider   ALPRAZolam (XANAX) 0.25 mg tablet Take  by mouth.    Yes Historical Provider      Not on File    Review of Systems:  A comprehensive review of systems was negative except for that written in the History of Present Illness. Objective:      Physical Exam:  GENERAL: alert, cooperative, no distress, appears stated age, EYE: negative findings: anicteric sclera, LYMPHATIC: Cervical, supraclavicular nodes normal. , THROAT & NECK: neck supple and symmetrical.  The thyroid is grossly normal., LUNG: clear to auscultation bilaterally, HEART: regular rate and rhythm, ABDOMEN: Soft, obese, ND. There is a tender incarcerated umbilical hernia. There is a healed infraumbilical surgical scar., EXTREMITIES:  no edema, SKIN: Normal., NEUROLOGIC: negative, PSYCHIATRIC: non focal    Assessment:     Incarcerated, recurrent umbilical hernia without gangrene or obstruction. Plan:     Mrs. Yvon Haley would like to have her hernia repaired, but wants to do it after her cataract procedure next week. I discussed the risks of the procedure including bleeding, infection, wound healing problems, injury to the bowel, recurrence of the hernia, and seroma. She understands the risks; any and all questions were answered to her satisfaction. Mrs. Yvon Haley will be scheduled for an elective robotic-assisted laparoscopic umbilical herniorrhaphy with mesh under general anesthesia. She will be admitted overnight for observation.     Signed By: Vivian Acevedo MD     April 4, 2017

## 2017-04-13 NOTE — PROGRESS NOTES
Received patient from PACU, four eyes skin assessment completed with PACU nurse, Skin intact other than surgical incision sites.  Patient responds to verbal stimuli

## 2017-04-13 NOTE — IP AVS SNAPSHOT
Current Discharge Medication List  
  
START taking these medications Dose & Instructions Dispensing Information Comments Morning Noon Evening Bedtime  
 oxyCODONE-acetaminophen 5-325 mg per tablet Commonly known as:  PERCOCET Your last dose was: Your next dose is:    
   
   
 Dose:  1 Tab Take 1 Tab by mouth every four (4) hours as needed. Max Daily Amount: 6 Tabs. Quantity:  30 Tab Refills:  0 CONTINUE these medications which have NOT CHANGED Dose & Instructions Dispensing Information Comments Morning Noon Evening Bedtime  
 allopurinol 100 mg tablet Commonly known as:  Madaiher Loomis Your last dose was: Your next dose is:    
   
   
 Dose:  200 mg Take 200 mg by mouth daily. Refills:  0 ALPRAZolam 0.25 mg tablet Commonly known as:  Brittany Rico Your last dose was: Your next dose is:    
   
   
 Dose:  0.25 mg Take 0.25 mg by mouth nightly as needed for Anxiety. Take 1/2 to 1 tablet by mouth at bedtime as needed for emotional upset   Indications: ANXIETY Refills:  0  
     
   
   
   
  
 aspirin delayed-release 81 mg tablet Your last dose was: Your next dose is: Take  by mouth daily. Refills:  0  
     
   
   
   
  
 aspirin-acetaminophen-caffeine 250-250-65 mg per tablet Commonly known as:  EXCEDRIN ES Your last dose was: Your next dose is:    
   
   
 Dose:  1 Tab Take 1 Tab by mouth. Refills:  0  
     
   
   
   
  
 atenolol 50 mg tablet Commonly known as:  TENORMIN Your last dose was: Your next dose is:    
   
   
 Dose:  50 mg Take 50 mg by mouth daily. Refills:  0  
     
   
   
   
  
 coenzyme Q-10 200 mg capsule Commonly known as:  CO Q-10 Your last dose was: Your next dose is:    
   
   
 Dose:  200 mg Take 200 mg by mouth daily. Refills:  0 glipiZIDE 10 mg tablet Commonly known as:  Daria Fili Your last dose was: Your next dose is:    
   
   
 Dose:  10 mg Take 10 mg by mouth two (2) times a day. Refills:  0  
     
   
   
   
  
 ibuprofen 200 mg tablet Commonly known as:  MOTRIN Your last dose was: Your next dose is:    
   
   
 Dose:  200 mg Take 200 mg by mouth two (2) times a day. Refills:  0  
     
   
   
   
  
 oondfes-hwowjtscs-ezbizbyqwffi -325 mg capsule Commonly known as:  Zac Ivan Your last dose was: Your next dose is:    
   
   
 Dose:  1 Cap Take 1 Cap by mouth four (4) times daily as needed. Refills:  0  
     
   
   
   
  
 levothyroxine 50 mcg tablet Commonly known as:  SYNTHROID Your last dose was: Your next dose is:    
   
   
 Dose:  50 mcg Take 50 mcg by mouth Daily (before breakfast). Refills:  0  
     
   
   
   
  
 lisinopril 5 mg tablet Commonly known as:  Elton Lofty Your last dose was: Your next dose is:    
   
   
 Dose:  5 mg Take 5 mg by mouth nightly. Refills:  0  
     
   
   
   
  
 multivitamin tablet Commonly known as:  ONE A DAY Your last dose was: Your next dose is:    
   
   
 Dose:  1 Tab Take 1 Tab by mouth daily. Refills:  0  
     
   
   
   
  
 naproxen 500 mg tablet Commonly known as:  NAPROSYN Your last dose was: Your next dose is:    
   
   
 Dose:  500 mg Take 500 mg by mouth as needed. Refills:  0  
     
   
   
   
  
 omeprazole 20 mg capsule Commonly known as:  PRILOSEC Your last dose was: Your next dose is:    
   
   
 Dose:  20 mg Take 20 mg by mouth daily. Refills:  0 PEPCID AC 10 mg tablet Generic drug:  famotidine Your last dose was: Your next dose is:    
   
   
 Dose:  10 mg Take 10 mg by mouth two (2) times daily as needed. Refills:  0 prednisoLONE acetate 1 % ophthalmic suspension Commonly known as:  PRED FORTE Your last dose was: Your next dose is:    
   
   
 Dose:  1 Drop Administer 1 Drop to both eyes four (4) times daily. Refills:  0 PRESERVISION AREDS 2 PO Your last dose was: Your next dose is:    
   
   
 Dose:  2 Tab Take 2 Tabs by mouth daily. Refills:  0  
     
   
   
   
  
 simvastatin 20 mg tablet Commonly known as:  ZOCOR Your last dose was: Your next dose is:    
   
   
 Dose:  20 mg Take 20 mg by mouth nightly. Refills:  0 SITagliptin 50 mg tablet Commonly known as:  John Lawton Your last dose was: Your next dose is:    
   
   
 Dose:  50 mg Take 50 mg by mouth nightly. Refills:  0  
     
   
   
   
  
 vitamin a-vitamin c-vit e-min tablet Commonly known as:  Baltazar Oropeza Your last dose was: Your next dose is:    
   
   
 Dose:  1 Tab Take 1 Tab by mouth daily. Refills:  0 Where to Get Your Medications Information on where to get these meds will be given to you by the nurse or doctor. ! Ask your nurse or doctor about these medications  
  oxyCODONE-acetaminophen 5-325 mg per tablet

## 2017-04-13 NOTE — DISCHARGE INSTRUCTIONS
Abdominal Hernia Repair: What to Expect at Home  Your Recovery  After surgery to repair your hernia, you are likely to have pain for a few days. You may also feel like you have the flu, and you may have a low fever and feel tired and nauseated. This is common. You should feel better after a few days and will probably feel much better in 7 days. For several weeks you may feel twinges or pulling in the hernia repair when you move. You may have some bruising around the area of your hernia repair. This is normal.  This care sheet gives you a general idea about how long it will take for you to recover, but each person recovers at a different pace. Follow the steps below to get better as quickly as possible. How can you care for yourself at home? Activity  · Rest when you feel tired. Getting enough sleep will help you recover. · Try to walk each day. Start by walking a little more than you did the day before. Bit by bit, increase the amount you walk. Walking boosts blood flow and helps prevent pneumonia and constipation. · If your doctor gives you an abdominal binder to wear, use it as directed. This is an elastic bandage that wraps around your belly and upper hips. It helps support your belly muscles after surgery. · Avoid strenuous activities, such as biking, jogging, weight lifting, or aerobic exercise, until your doctor says it is okay. · Avoid lifting anything over 30 pounds or that would make you strain for 6 weeks! This may include heavy grocery bags and milk containers, a heavy briefcase or backpack, cat litter or dog food bags, a vacuum , or a child. · Ask your doctor when you can drive again. · Most people are able to return to work within 1 to 2 weeks after surgery, but if your job requires that you to do heavy lifting or strenuous activity, you may need to take 4 to 6 weeks off from work. · You may shower 24 hours after surgery, if your doctor okays it.   Pat the cuts (incisions) dry. Do not take a bath for the first 2 weeks and until after seen by your doctor . Diet  · You can eat your normal diet. If your stomach is upset, try bland, low-fat foods like plain rice, broiled chicken, toast, and yogurt. · Drink plenty of fluids (unless your doctor tells you not to). · You may notice that your bowel movements are not regular right after your surgery. This is common. Avoid constipation and straining with bowel movements. You may want to take a fiber supplement every day. If you have not had a bowel movement after a couple of days, ask your doctor about taking a mild laxative. Medicines  · You can restart your medicines. Your doctor will also give you instructions about taking any new medicines. · If you take blood thinners, such as warfarin (Coumadin), be sure to talk to your doctor. Your doctor will tell you if and when to start taking those medicines again. Make sure that you understand exactly what your doctor wants you to do. · Be safe with medicines. Take pain medicines exactly as directed. ¨ If the doctor gave you a prescription medicine for pain, take it as prescribed. · If you are not taking a prescription pain medicine, ask your doctor if you can take an over-the-counter medicine. · If you think your pain medicine is making you sick to your stomach:  ¨ Take your medicine after meals (unless your doctor has told you not to). ¨ Ask your doctor for a different pain medicine. Incision care  · Remove your bandages on Saturday, 4/15. You may leave your incisions uncovered. · If you have strips of tape on the cut (incision) the doctor made, leave the tape on for a week or until it falls off. · Wash the area daily with warm, soapy water, and pat it dry. Don't use hydrogen peroxide or alcohol, which can slow healing. You may cover the area with a gauze bandage if it weeps or rubs against clothing. Change the bandage every day.   · Keep the incisions clean and dry. Other instructions  · Hold a pillow over your incision when you cough or take deep breaths. This will support your belly and decrease your pain. · Do breathing exercises at home as instructed by your doctor. This will help prevent pneumonia. · If you had laparoscopic surgery, you may also have pain in your left shoulder. The pain usually lasts about a day or two. Follow-up care is a key part of your treatment and safety. Be sure to make and go to all appointments, and call your doctor if you are having problems. It's also a good idea to know your test results and keep a list of the medicines you take. When should you call for help? Call 911 anytime you think you may need emergency care. For example, call if:  · You passed out (lost consciousness). · You have sudden chest pain and shortness of breath, or you cough up blood. · You have severe pain in your belly. Call your doctor now or seek immediate medical care if:  · You are sick to your stomach and cannot keep fluids down. · You have signs of a blood clot, such as:  ¨ Pain in your calf, back of the knee, thigh, or groin. ¨ Redness and swelling in your leg or groin. · You have signs of infection, such as:  ¨ Increased pain, swelling, warmth, or redness. ¨ Red streaks leading from the incision. ¨ Pus draining from the incision. ¨ A fever > 101. · You have trouble passing urine or stool, especially if you have mild pain or swelling in your lower belly. · Bright red blood has soaked through the bandage over your incision. Watch closely for changes in your health, and be sure to contact your doctor if:  · Your swelling is getting worse. · Your swelling is not going down. · You still don't have a bowel movement after taking a laxative. Where can you learn more? Go to http://radha-jagdish.info/. Enter B577 in the search box to learn more about \"Abdominal Hernia Repair: What to Expect at Home. \"  Current as of: August 9, 2016  Content Version: 11.2  © 0692-5282 [employer]. Care instructions adapted under license by [employer] (which disclaims liability or warranty for this information). If you have questions about a medical condition or this instruction, always ask your healthcare professional. [place] [address] any warranty or liability for your use of this information. Rosa Collins.  Karli Bhatia MD, [facility]  General Surgery at [address] [place] [address], [address] [place] [address] , [address]  [facility]  [phone]  Fax [phone]

## 2017-04-13 NOTE — ANESTHESIA POSTPROCEDURE EVALUATION
Post-Anesthesia Evaluation and Assessment    Patient: Nicola Garnica MRN: [id number]  SSN: [id number]    YOB: 1940  Age: 64 y.o. Sex: female       Cardiovascular Function/Vital Signs  Visit Vitals    /42    Pulse 70    Temp 36.4 °C (97.6 °F)    Resp 11    Ht 5' (1.524 m)    Wt 77.3 kg (170 lb 6.7 oz)    SpO2 97%    BMI 33.28 kg/m2       Patient is status post general anesthesia for Procedure(s):  ROBOTIC ASSISTED LAPAROSCOPIC UMBILICAL HERNIA REPAIR W MESH POSSIBLE OPEN  . Nausea/Vomiting: None    Postoperative hydration reviewed and adequate. Pain:  Pain Scale 1: Numeric (0 - 10) (04/13/17 1615)  Pain Intensity 1: 0 (04/13/17 1615)   Managed    Neurological Status:   Neuro (WDL): Exceptions to WDL (04/13/17 1615)  Neuro  Neurologic State: Drowsy (04/13/17 1615)   At baseline    Mental Status and Level of Consciousness: Arousable    Pulmonary Status:   O2 Device: Nasal cannula (04/13/17 1616)   Adequate oxygenation and airway patent    Complications related to anesthesia: None    Post-anesthesia assessment completed.  No concerns    Signed By: Tessa Henao MD     April 13, 2017

## 2017-04-14 VITALS
WEIGHT: 170.42 LBS | BODY MASS INDEX: 33.46 KG/M2 | HEART RATE: 67 BPM | HEIGHT: 60 IN | DIASTOLIC BLOOD PRESSURE: 54 MMHG | OXYGEN SATURATION: 94 % | RESPIRATION RATE: 18 BRPM | TEMPERATURE: 97.5 F | SYSTOLIC BLOOD PRESSURE: 116 MMHG

## 2017-04-14 LAB
GLUCOSE BLD STRIP.AUTO-MCNC: 129 MG/DL (ref 65–100)
SERVICE CMNT-IMP: ABNORMAL

## 2017-04-14 PROCEDURE — 82962 GLUCOSE BLOOD TEST: CPT

## 2017-04-14 PROCEDURE — 74011250637 HC RX REV CODE- 250/637: Performed by: SURGERY

## 2017-04-14 PROCEDURE — 99218 HC RM OBSERVATION: CPT

## 2017-04-14 PROCEDURE — 74011000250 HC RX REV CODE- 250: Performed by: SURGERY

## 2017-04-14 RX ORDER — OXYCODONE AND ACETAMINOPHEN 5; 325 MG/1; MG/1
1 TABLET ORAL
Qty: 30 TAB | Refills: 0 | Status: SHIPPED | OUTPATIENT
Start: 2017-04-14 | End: 2020-08-19

## 2017-04-14 RX ADMIN — ASPIRIN 81 MG: 81 TABLET, COATED ORAL at 08:52

## 2017-04-14 RX ADMIN — PREDNISOLONE ACETATE 1 DROP: 10 SUSPENSION/ DROPS OPHTHALMIC at 08:51

## 2017-04-14 RX ADMIN — LEVOTHYROXINE SODIUM 50 MCG: 50 TABLET ORAL at 06:59

## 2017-04-14 RX ADMIN — THERA TABS 1 TABLET: TAB at 08:52

## 2017-04-14 RX ADMIN — GLIPIZIDE 10 MG: 5 TABLET ORAL at 08:52

## 2017-04-14 RX ADMIN — FAMOTIDINE 10 MG: 20 TABLET, FILM COATED ORAL at 06:59

## 2017-04-14 RX ADMIN — Medication 10 ML: at 07:00

## 2017-04-14 NOTE — PROGRESS NOTES
4/14/2017  10:02 AM  CM met w/ pt for assessment and D/C planning. Demographics verified, PCP is Dr. Johana Perkins. Lives w/ son Shelbie Ye (C) [phone] in 3 story hoem w/ 1 step in front, bed/bath on 3rd level w/ 20 steps. PTA pt independent w/ ADL's, drives. Rx fills at [address] [place] [address] Dr or [facility]. Denies previous HH/DME use. Family will transport home and to all F/U. [facility] OBS letters given/signed. Care Management Interventions  PCP Verified by CM: Yes (Pt last saw PCP 2 wks ago)  Palliative Care Consult (Criteria: CHF and RRAT>21): No  Reason for No Palliative Care Consult:  Other (see comment) (Pt RRAT 11)  Mode of Transport at Discharge: Self  Current Support Network:  (Pt lives w/ son)  Confirm Follow Up Transport: Family  Plan discussed with Pt/Family/Caregiver: Yes  Discharge Location  Discharge Placement: Home  Dylon Borja  Case Management

## 2017-04-14 NOTE — PROGRESS NOTES
Bedside and Verbal shift change report given to Annabel Zheng RN (oncoming nurse) by Pedro Lyn RN (offgoing nurse). Report included the following information SBAR, Kardex, Intake/Output, MAR and Recent Results.

## 2017-04-14 NOTE — OP NOTES
[facility]   [facility], [address]   OP NOTE       Name:  Tessa Vega   MR#:  [id number]   :  1940   Account #:  [de-identified]    Surgery Date:  2017   Date of Adm:  2017       SURGEON:   Maximino Michelle. Toby Mcgregor MD.    ASSISTANT:   Ilir France. ANESTHESIA:     1. General endotracheal.   2. 0.5% Marcaine. 3. Exparel. PREOPERATIVE DIAGNOSIS:  Incarcerated recurrent umbilical hernia. POSTOPERATIVE DIAGNOSIS:   Incarcerated recurrent umbilical hernia. PROCEDURES:     1. Robotic-assisted laparoscopic repair of incarcerated recurrent umbilical hernia with a Ventralight ST mesh (7 cm circular). 2. Extraction of old mesh. INDICATION:   Mrs. Shannan Camacho is a 68-year-old, white female who was referred to the office for an umbilical hernia. She has had a previous repair done with mesh; however, has had recurrence with a large bulge which is not reducible. Clinically, she has an incarcerated umbilical hernia. A laparoscopic approach has been recommended, since she had a previous open repair. She now presents at this time for her elective procedure. PROCEDURE IN DETAIL:   The patient was seen preoperatively in the holding area. The risks, benefits, and expected outcomes were discussed with the patient, and all questions were answered satisfactorily. The patient concurred with the proposed plan, giving   informed consent. The patient was taken to the OR. The patient was identified as Estela Paredes, and the procedure verified as Robotic-assisted laparoscopic umbilical herniorrhaphy with mesh, possible open. The patient was placed on the OR table in the supine position. Prior to induction, antibiotic prophylaxis was administered. General anesthesia was administered and tolerated well. The patient's left side was propped up, and her arms were tucked.   The patient's abdomen was prepped with ChloraPrep and draped in the usual sterile fashion with Ioban placed over the skin. A time-out was performed, and the   above information was confirmed. The local anesthetic was injected into the skin and subcutaneous tissue in the left upper quadrant at Jacobs's point. Using a 15 blade, a small stab incision was made. Towel clips were used to elevate the patient's abdominal wall. A Veress needle was introduced into the abdomen. Its position was verified with aspiration and instillation of saline. Insufflation was provided through the Veress needle to establish a pneumoperitoneum of 15 mmHg, which the patient   tolerated. The local anesthetic was injected at all trocar sites. The incision was made in the left mid abdomen. Using the Optiview technique, a 5 mm trocar was introduced into the abdomen. The abdominal cavity was inspected, and there were no adhesions order to prevent a laparoscopic approach. The umbilical hernia was identified at the anticipated site. Two 8 mm trocars were placed in the left upper quadrant and left lower quadrant, respectively, under direct laparoscopic vision. The 5 mm trocar was upsized to an 8-mm trocar. The robotic arms were docked. At this time, I scrubbed out and went to the surgeon console. I took control of the robotic arms for the majority of the procedure. The omentum was easily reduced using gentle traction, cautery, and scissors. The previously placed mesh was identified partially intact, but had torn away from the fascia on the left side. Since it was partially folded over, the mesh was completely excised from the abdominal wall, the umbilical fascial defect measured approximately 1.5 x 1.5 cm. There were no other defects noted. The pneumoperitoneum was taken down to 10 mmHg. The fascial defect was closed primarily with a running 2-0 Stratafix suture in a transverse orientation. The pneumoperitoneum was taken back up to 15 mmHg.   An 11.4 circular Ventralight ST mesh was chosen for the repair, and it was tailored to a 7 cm circular piece, in order to provide a 3 cm overlap on all sides. A Vicryl suture was placed in the center of the mesh on the rough side. The mesh was rolled up and placed into the abdomen. The mesh was unrolled and pulled up to the abdominal wall at the center of the fascial repair, with the rough side apposing the peritoneum. Two 2-0 Stratafix sutures were placed at the 6 o'clock and 12 o'clock positions, respectively, to fixate the mesh. The edges of the   mesh were then sewn in place in a continuous fashion with the respective sutures on each side, pulling the mesh taut. The needles were removed. The underlying bowel was examined, and no injuries were noted. The excised old mesh was pulled out through the left upper quadrant trocar as it was removed and passed off as a specimen. At this time, I scrubbed back in and went to the patient's bedside. Exparel was injected into the skin and subcutaneous tissue around the mesh under direct laparoscopic vision. The left mid abdomen trocar was removed, and no bleeding was noted from the trocar sites. The scope was removed, and the abdomen was decompressed. The left lower quadrant trocar was then removed. Hemostasis was obtained within the wounds as needed with cautery. The skin at all sites was closed with 4-0 Monocryl in the usual subcuticular fashion. The wounds were cleaned and dried, and Steri-Strips and bandage were applied. An abdominal binder was placed. The patient was extubated in the room. ESTIMATED BLOOD LOSS:   Less than 25 mL. SPECIMENS:   Old mesh. IMPLANTS:  A 7 cm circular Ventralight ST mesh was placed as an onlay intraperitoneal mesh directly below the umbilical fascial repair. FINDINGS:  1. Old mesh present, but torn away from the left side of the umbilical fascia. 2. An approximately 1.5 x 1.5 cm umbilical fascial defect with incarcerated omentum.     COUNTS:  All sponge, needle, and instrument counts were correct x 2. COMPLICATIONS:   None. DISPOSITION:   The patient was transferred to the recovery room in stable condition, having tolerated the procedure and anesthesia well.         MD Kris Krause   D:  04/14/2017   06:52   T:  04/14/2017   07:59   Job #:  [id number]     Louisa Vick MD

## 2017-04-14 NOTE — PROGRESS NOTES
Progress Note    Patient: Dominga Palomares MRN: [id number]  SSN: [id number]    YOB: 1940  Age: 64 y.o. Sex: female      Admit Date: 2017    1 Day Post-Op    Procedure:  Procedure(s):  ROBOTIC ASSISTED LAPAROSCOPIC UMBILICAL HERNIA REPAIR W MESH POSSIBLE OPEN      Subjective:     Mrs. Lester Console has no complaints. Her pain is a 1/10. She tolerated her diet and is ready to go home. Objective:     Visit Vitals    /60 (BP 1 Location: Left arm, BP Patient Position: At rest)    Pulse 69    Temp 97.6 °F (36.4 °C)    Resp 19    Ht 5' (1.524 m)    Wt 170 lb 6.7 oz (77.3 kg)    SpO2 96%    Breastfeeding No    BMI 33.28 kg/m2       Temp (24hrs), Av.5 °F (36.4 °C), Min:96.7 °F (35.9 °C), Max:98 °F (36.7 °C)      Physical Exam:    GENERAL: alert, cooperative, no distress, ABDOMEN: Soft, ND, appropriately tender. The dressings are intact and dry. Lab Review:   BMP: No results found for: NA, K, CL, CO2, AGAP, GLU, BUN, CREA, GFRAA, GFRNA  CBC: No results found for: WBC, HGB, HGBEXT, HCT, HCTEXT, PLT, PLTEXT, HGBEXT, HCTEXT, PLTEXT    Assessment:     Hospital Problems  Date Reviewed: 2017          Codes Class Noted POA    S/P laparoscopic hernia repair ICD-10-CM: E23.721, Z87.19  ICD-9-CM: V45.89  2017 No    Overview Signed 2017  6:54 PM by Megan Marcial MD     Robotic-assisted laparoscopic repair of incarcerated, recurrent umbilical hernia with mesh. Plan/Recommendations/Medical Decision Making:     D/C IVF's. D/C home.     Signed By: Megan Marcial MD     2017

## 2017-04-17 ENCOUNTER — TELEPHONE (OUTPATIENT)
Dept: SURGERY | Age: 77
End: 2017-04-17

## 2017-04-17 NOTE — TELEPHONE ENCOUNTER
Patient said she is doing good. She is not taking her pain medication. She is still wearing her binder.  She said she would like to return back to work on 5/1/17,her follow up visit is set for 4/26/17 with Dr Mayuri Harry

## 2017-04-18 ENCOUNTER — TELEPHONE (OUTPATIENT)
Dept: SURGERY | Age: 77
End: 2017-04-18

## 2017-04-18 NOTE — TELEPHONE ENCOUNTER
Called to inform patient her paperwork is complete. im unable to fax the papers, it states to return back to the patient. She would like the papers mailed to her house.  Kelsea Sutton do so today

## 2017-04-26 ENCOUNTER — OFFICE VISIT (OUTPATIENT)
Dept: SURGERY | Age: 77
End: 2017-04-26

## 2017-04-26 VITALS
HEIGHT: 60 IN | TEMPERATURE: 97.4 F | RESPIRATION RATE: 14 BRPM | HEART RATE: 69 BPM | SYSTOLIC BLOOD PRESSURE: 102 MMHG | DIASTOLIC BLOOD PRESSURE: 44 MMHG | BODY MASS INDEX: 33.18 KG/M2 | WEIGHT: 169 LBS | OXYGEN SATURATION: 97 %

## 2017-04-26 DIAGNOSIS — Z98.890 S/P LAPAROSCOPIC HERNIA REPAIR: Primary | ICD-10-CM

## 2017-04-26 DIAGNOSIS — Z87.19 S/P LAPAROSCOPIC HERNIA REPAIR: Primary | ICD-10-CM

## 2017-04-26 PROBLEM — K42.0 RECURRENT UMBILICAL HERNIA WITH INCARCERATION: Status: RESOLVED | Noted: 2017-04-04 | Resolved: 2017-04-26

## 2017-05-08 ENCOUNTER — TELEPHONE (OUTPATIENT)
Dept: SURGERY | Age: 77
End: 2017-05-08

## 2017-05-22 ENCOUNTER — OFFICE VISIT (OUTPATIENT)
Dept: SURGERY | Age: 77
End: 2017-05-22

## 2017-05-22 VITALS
HEIGHT: 60 IN | TEMPERATURE: 98.2 F | OXYGEN SATURATION: 98 % | WEIGHT: 169 LBS | HEART RATE: 65 BPM | DIASTOLIC BLOOD PRESSURE: 49 MMHG | RESPIRATION RATE: 14 BRPM | BODY MASS INDEX: 33.18 KG/M2 | SYSTOLIC BLOOD PRESSURE: 111 MMHG

## 2017-05-22 DIAGNOSIS — Z87.19 S/P LAPAROSCOPIC HERNIA REPAIR: Primary | ICD-10-CM

## 2017-05-22 DIAGNOSIS — Z98.890 S/P LAPAROSCOPIC HERNIA REPAIR: Primary | ICD-10-CM

## 2017-05-22 NOTE — LETTER
NOTIFICATION RETURN TO WORK / SCHOOL 
 
5/22/2017 11:15 AM 
 
Ms. Shanice Rubio [facility] [address] [address] To Whom It May Concern: 
 
Shanice Rubio is currently under the care of Leesa Lama. She will return to work/school on: 5/23/17 with no restrictions. If there are questions or concerns please have the patient contact our office.  
 
 
 
Sincerely, 
 
 
Dorian Dupree MD

## 2017-05-22 NOTE — PROGRESS NOTES
1. Have you been to the ER, urgent care clinic since your last visit? Hospitalized since your last visit?no    2. Have you seen or consulted any other health care providers outside of the [address] [place] [address] since your last visit? Include any pap smears or colon screening.  no

## 2017-09-06 ENCOUNTER — HOSPITAL ENCOUNTER (EMERGENCY)
Age: 77
Discharge: HOME | End: 2017-09-06
Attending: EMERGENCY MEDICINE
Payer: COMMERCIAL

## 2017-09-06 VITALS
DIASTOLIC BLOOD PRESSURE: 57 MMHG | WEIGHT: 170 LBS | OXYGEN SATURATION: 100 % | RESPIRATION RATE: 16 BRPM | BODY MASS INDEX: 33.38 KG/M2 | HEIGHT: 60 IN | HEART RATE: 69 BPM | SYSTOLIC BLOOD PRESSURE: 126 MMHG | TEMPERATURE: 98.2 F

## 2017-09-06 DIAGNOSIS — N18.9 CHRONIC RENAL INSUFFICIENCY, UNSPECIFIED STAGE: ICD-10-CM

## 2017-09-06 DIAGNOSIS — R19.7 DIARRHEA, UNSPECIFIED TYPE: Primary | ICD-10-CM

## 2017-09-06 LAB
ALBUMIN SERPL-MCNC: 3.4 G/DL (ref 3.5–5)
ALBUMIN/GLOB SERPL: 1 {RATIO} (ref 1.1–2.2)
ALP SERPL-CCNC: 67 U/L (ref 45–117)
ALT SERPL-CCNC: 38 U/L (ref 12–78)
ANION GAP SERPL CALC-SCNC: 7 MMOL/L (ref 5–15)
APPEARANCE UR: CLEAR
APTT PPP: 27.3 SEC (ref 22.1–32.5)
AST SERPL-CCNC: 27 U/L (ref 15–37)
BACTERIA URNS QL MICRO: NEGATIVE /HPF
BASOPHILS # BLD: 0 K/UL (ref 0–0.1)
BASOPHILS NFR BLD: 0 % (ref 0–1)
BILIRUB SERPL-MCNC: 0.3 MG/DL (ref 0.2–1)
BILIRUB UR QL: NEGATIVE
BUN SERPL-MCNC: 26 MG/DL (ref 6–20)
BUN/CREAT SERPL: 19 (ref 12–20)
CALCIUM SERPL-MCNC: 8.2 MG/DL (ref 8.5–10.1)
CHLORIDE SERPL-SCNC: 105 MMOL/L (ref 97–108)
CO2 SERPL-SCNC: 27 MMOL/L (ref 21–32)
COLOR UR: NORMAL
CREAT SERPL-MCNC: 1.37 MG/DL (ref 0.55–1.02)
EOSINOPHIL # BLD: 0.3 K/UL (ref 0–0.4)
EOSINOPHIL NFR BLD: 4 % (ref 0–7)
EPITH CASTS URNS QL MICRO: NORMAL /LPF
ERYTHROCYTE [DISTWIDTH] IN BLOOD BY AUTOMATED COUNT: 14.8 % (ref 11.5–14.5)
GLOBULIN SER CALC-MCNC: 3.4 G/DL (ref 2–4)
GLUCOSE SERPL-MCNC: 128 MG/DL (ref 65–100)
GLUCOSE UR STRIP.AUTO-MCNC: NEGATIVE MG/DL
HCT VFR BLD AUTO: 35.3 % (ref 35–47)
HEMOCCULT STL QL: NEGATIVE
HGB BLD-MCNC: 11.1 G/DL (ref 11.5–16)
HGB UR QL STRIP: NEGATIVE
HYALINE CASTS URNS QL MICRO: NORMAL /LPF (ref 0–5)
INR PPP: 1 (ref 0.9–1.1)
KETONES UR QL STRIP.AUTO: NEGATIVE MG/DL
LEUKOCYTE ESTERASE UR QL STRIP.AUTO: NEGATIVE
LIPASE SERPL-CCNC: 136 U/L (ref 73–393)
LYMPHOCYTES # BLD: 1.9 K/UL (ref 0.8–3.5)
LYMPHOCYTES NFR BLD: 25 % (ref 12–49)
MAGNESIUM SERPL-MCNC: 2.1 MG/DL (ref 1.6–2.4)
MCH RBC QN AUTO: 31.8 PG (ref 26–34)
MCHC RBC AUTO-ENTMCNC: 31.4 G/DL (ref 30–36.5)
MCV RBC AUTO: 101.1 FL (ref 80–99)
MONOCYTES # BLD: 0.8 K/UL (ref 0–1)
MONOCYTES NFR BLD: 10 % (ref 5–13)
NEUTS SEG # BLD: 4.6 K/UL (ref 1.8–8)
NEUTS SEG NFR BLD: 61 % (ref 32–75)
NITRITE UR QL STRIP.AUTO: NEGATIVE
PH UR STRIP: 5.5 [PH] (ref 5–8)
PLATELET # BLD AUTO: 211 K/UL (ref 150–400)
POTASSIUM SERPL-SCNC: 5 MMOL/L (ref 3.5–5.1)
PROT SERPL-MCNC: 6.8 G/DL (ref 6.4–8.2)
PROT UR STRIP-MCNC: NEGATIVE MG/DL
PROTHROMBIN TIME: 10.2 SEC (ref 9–11.1)
RBC # BLD AUTO: 3.49 M/UL (ref 3.8–5.2)
RBC #/AREA URNS HPF: NORMAL /HPF (ref 0–5)
SODIUM SERPL-SCNC: 139 MMOL/L (ref 136–145)
SP GR UR REFRACTOMETRY: 1.01 (ref 1–1.03)
THERAPEUTIC RANGE,PTTT: NORMAL SECS (ref 58–77)
UA: UC IF INDICATED,UAUC: NORMAL
UROBILINOGEN UR QL STRIP.AUTO: 0.2 EU/DL (ref 0.2–1)
WBC # BLD AUTO: 7.6 K/UL (ref 3.6–11)
WBC URNS QL MICRO: NORMAL /HPF (ref 0–4)

## 2017-09-06 PROCEDURE — 83690 ASSAY OF LIPASE: CPT | Performed by: EMERGENCY MEDICINE

## 2017-09-06 PROCEDURE — 85730 THROMBOPLASTIN TIME PARTIAL: CPT | Performed by: EMERGENCY MEDICINE

## 2017-09-06 PROCEDURE — 36415 COLL VENOUS BLD VENIPUNCTURE: CPT | Performed by: EMERGENCY MEDICINE

## 2017-09-06 PROCEDURE — 96360 HYDRATION IV INFUSION INIT: CPT

## 2017-09-06 PROCEDURE — 74011250636 HC RX REV CODE- 250/636: Performed by: EMERGENCY MEDICINE

## 2017-09-06 PROCEDURE — 96361 HYDRATE IV INFUSION ADD-ON: CPT

## 2017-09-06 PROCEDURE — 87045 FECES CULTURE AEROBIC BACT: CPT | Performed by: EMERGENCY MEDICINE

## 2017-09-06 PROCEDURE — 89055 LEUKOCYTE ASSESSMENT FECAL: CPT | Performed by: EMERGENCY MEDICINE

## 2017-09-06 PROCEDURE — 85025 COMPLETE CBC W/AUTO DIFF WBC: CPT | Performed by: EMERGENCY MEDICINE

## 2017-09-06 PROCEDURE — 81001 URINALYSIS AUTO W/SCOPE: CPT | Performed by: EMERGENCY MEDICINE

## 2017-09-06 PROCEDURE — 83735 ASSAY OF MAGNESIUM: CPT | Performed by: EMERGENCY MEDICINE

## 2017-09-06 PROCEDURE — 82272 OCCULT BLD FECES 1-3 TESTS: CPT | Performed by: EMERGENCY MEDICINE

## 2017-09-06 PROCEDURE — 87493 C DIFF AMPLIFIED PROBE: CPT | Performed by: EMERGENCY MEDICINE

## 2017-09-06 PROCEDURE — 85610 PROTHROMBIN TIME: CPT | Performed by: EMERGENCY MEDICINE

## 2017-09-06 PROCEDURE — 99285 EMERGENCY DEPT VISIT HI MDM: CPT

## 2017-09-06 PROCEDURE — 87177 OVA AND PARASITES SMEARS: CPT | Performed by: EMERGENCY MEDICINE

## 2017-09-06 PROCEDURE — 74011250637 HC RX REV CODE- 250/637: Performed by: EMERGENCY MEDICINE

## 2017-09-06 PROCEDURE — 80053 COMPREHEN METABOLIC PANEL: CPT | Performed by: EMERGENCY MEDICINE

## 2017-09-06 RX ORDER — METRONIDAZOLE 250 MG/1
500 TABLET ORAL
Status: COMPLETED | OUTPATIENT
Start: 2017-09-06 | End: 2017-09-06

## 2017-09-06 RX ORDER — CIPROFLOXACIN 500 MG/1
500 TABLET ORAL
Status: COMPLETED | OUTPATIENT
Start: 2017-09-06 | End: 2017-09-06

## 2017-09-06 RX ORDER — METRONIDAZOLE 500 MG/1
500 TABLET ORAL 3 TIMES DAILY
Qty: 30 TAB | Refills: 0 | Status: SHIPPED | OUTPATIENT
Start: 2017-09-06 | End: 2017-09-16

## 2017-09-06 RX ORDER — CIPROFLOXACIN 500 MG/1
500 TABLET ORAL 2 TIMES DAILY
Qty: 20 TAB | Refills: 0 | Status: SHIPPED | OUTPATIENT
Start: 2017-09-06 | End: 2017-09-16

## 2017-09-06 RX ADMIN — SODIUM CHLORIDE 1000 ML: 900 INJECTION, SOLUTION INTRAVENOUS at 11:45

## 2017-09-06 RX ADMIN — CIPROFLOXACIN HYDROCHLORIDE 500 MG: 500 TABLET, FILM COATED ORAL at 14:43

## 2017-09-06 RX ADMIN — METRONIDAZOLE 500 MG: 250 TABLET, FILM COATED ORAL at 14:43

## 2017-09-06 NOTE — ED PROVIDER NOTES
Patient is a 64 y.o. female presenting with diarrhea. Diarrhea    Associated symptoms include diarrhea and nausea. Pertinent negatives include no fever, no vomiting, no constipation, no dysuria, no hematuria, no headaches, no chest pain and no back pain. 63 yo WF presents with diarrhea onset at 11pm last night, \"constant\", loose, brown, no black stools. Does see some red blood only with wiping, no blood in stool. Had taken several immodium AD this morning without improvement in symptoms. Denies fever, chills, vomiting. Mild nausea, mild abdominal \"griping\" intermittent. Denies abdominal pain, chest pain, sob, dysuria, hematuria. Denies recent travel, camping, sick contacts, or recent antibiotic use. Past Medical History:   Diagnosis Date    Anxiety     Cancer [facility])     Skin of face.  Cataracts, bilateral     Chronic back pain     x1 yr;seeing Chiropractor for a year    Diabetes ([facility] [address]     GERD (gastroesophageal reflux disease)     Gout     Hypercholesterolemia     Hypertension     Hypothyroidism     Migraine     Shingles        Past Surgical History:   Procedure Laterality Date    HX CHOLECYSTECTOMY      Open cholecystectomy.  HX DILATION AND CURETTAGE      HX HERNIA REPAIR      Umbilical herniorrhaphy with mesh.  HX TONSILLECTOMY           Family History:   Problem Relation Age of Onset    Diabetes Mother        Social History     Social History    Marital status:      Spouse name: N/A    Number of children: N/A    Years of education: N/A     Occupational History    Not on file. Social History Main Topics    Smoking status: Never Smoker    Smokeless tobacco: Never Used    Alcohol use No    Drug use: No    Sexual activity: Not on file     Other Topics Concern    Not on file     Social History Narrative    ** Merged History Encounter **              ALLERGIES: Shellfish derived    Review of Systems   Constitutional: Negative for chills and fever.    Respiratory: Negative for cough and shortness of breath. Cardiovascular: Negative for chest pain. Gastrointestinal: Positive for diarrhea and nausea. Negative for abdominal pain, constipation and vomiting. Genitourinary: Negative for dysuria and hematuria. Musculoskeletal: Negative for back pain. Skin: Negative for rash. Neurological: Negative for headaches. All other systems reviewed and are negative.       Vitals:    09/06/17 1117   BP: 131/65   Pulse: 79   Resp: 15   Temp: 97.8 °F (36.6 °C)   SpO2: 97%   Weight: 77.1 kg (170 lb)   Height: 5' (1.524 m)            Physical Exam   Physical Examination: General appearance - alert, mild distress, oriented to person, place, and time and normal appearing weight  Eyes - pupils equal and reactive, extraocular eye movements intact  Neck - supple, no significant adenopathy  Chest - clear to auscultation, no wheezes, rales or rhonchi, symmetric air entry  Heart - normal rate, regular rhythm, normal S1, S2, no murmurs, rubs, clicks or gallops  Abdomen - soft, nontender, nondistended, no masses or organomegaly  Back exam - full range of motion, no tenderness, palpable spasm or pain on motion  Neurological - alert, oriented, normal speech, no focal findings or movement disorder noted  Musculoskeletal - no joint tenderness, deformity or swelling  Extremities - peripheral pulses normal, no pedal edema, no clubbing or cyanosis  Skin - normal coloration and turgor, no rashes, no suspicious skin lesions noted  MDM  Number of Diagnoses or Management Options  Chronic renal insufficiency, unspecified stage:   Diarrhea, unspecified type:      Amount and/or Complexity of Data Reviewed  Clinical lab tests: ordered and reviewed  Tests in the radiology section of CPT®: ordered and reviewed  Decide to obtain previous medical records or to obtain history from someone other than the patient: yes  Review and summarize past medical records: yes  Independent visualization of images, tracings, or specimens: yes    Patient Progress  Patient progress: improved    ED Course       Procedures  Pt feeling better, diarrhea has slowed down. No vomiting. Tolerating PO. Abdomen soft, nontender. VSS. Stool studies pending. Will d/c with cipro/flagyl. F/u with pcp or return to ED for worsening symptoms.

## 2017-09-06 NOTE — ED TRIAGE NOTES
Patient arrives from home for diarrhea and abdominal pain that began last night. Denies any nausea or vomiting, shortness of breath or chest pain.

## 2017-09-06 NOTE — ED NOTES
Discharge instructions given to patient and explained by provider. Patient discharged via wheelchair in the care of family.

## 2017-09-06 NOTE — DISCHARGE INSTRUCTIONS
Diarrhea: Care Instructions  Your Care Instructions    Diarrhea is loose, watery stools (bowel movements). The exact cause is often hard to find. Sometimes diarrhea is your body's way of getting rid of what caused an upset stomach. Viruses, food poisoning, and many medicines can cause diarrhea. Some people get diarrhea in response to emotional stress, anxiety, or certain foods. Almost everyone has diarrhea now and then. It usually isn't serious, and your stools will return to normal soon. The important thing to do is replace the fluids you have lost, so you can prevent dehydration. The doctor has checked you carefully, but problems can develop later. If you notice any problems or new symptoms, get medical treatment right away. Follow-up care is a key part of your treatment and safety. Be sure to make and go to all appointments, and call your doctor if you are having problems. It's also a good idea to know your test results and keep a list of the medicines you take. How can you care for yourself at home? · Watch for signs of dehydration, which means your body has lost too much water. Dehydration is a serious condition and should be treated right away. Signs of dehydration are:  ¨ Increasing thirst and dry eyes and mouth. ¨ Feeling faint or lightheaded. ¨ Darker urine, and a smaller amount of urine than normal.  · To prevent dehydration, drink plenty of fluids, enough so that your urine is light yellow or clear like water. Choose water and other caffeine-free clear liquids until you feel better. If you have kidney, heart, or liver disease and have to limit fluids, talk with your doctor before you increase the amount of fluids you drink. · Begin eating small amounts of mild foods the next day, if you feel like it. ¨ Try yogurt that has live cultures of Lactobacillus. (Check the label.)  ¨ Avoid spicy foods, fruits, alcohol, and caffeine until 48 hours after all symptoms are gone.   ¨ Avoid chewing gum that contains sorbitol. ¨ Avoid dairy products (except for yogurt with Lactobacillus) while you have diarrhea and for 3 days after symptoms are gone. · The doctor may recommend that you take over-the-counter medicine, such as loperamide (Imodium), if you still have diarrhea after 6 hours. Read and follow all instructions on the label. Do not use this medicine if you have bloody diarrhea, a high fever, or other signs of serious illness. Call your doctor if you think you are having a problem with your medicine. When should you call for help? Call 911 anytime you think you may need emergency care. For example, call if:  · You passed out (lost consciousness). · Your stools are maroon or very bloody. Call your doctor now or seek immediate medical care if:  · You are dizzy or lightheaded, or you feel like you may faint. · Your stools are black and look like tar, or they have streaks of blood. · You have new or worse belly pain. · You have symptoms of dehydration, such as:  ¨ Dry eyes and a dry mouth. ¨ Passing only a little dark urine. ¨ Feeling thirstier than usual.  · You have a new or higher fever. Watch closely for changes in your health, and be sure to contact your doctor if:  · Your diarrhea is getting worse. · You see pus in the diarrhea. · You are not getting better after 2 days (48 hours). Where can you learn more? Go to http://radha-jagdish.info/. Enter W476 in the search box to learn more about \"Diarrhea: Care Instructions. \"  Current as of: March 20, 2017  Content Version: 11.3  © 7674-5799 [employer]. Care instructions adapted under license by [employer] (which disclaims liability or warranty for this information). If you have questions about a medical condition or this instruction, always ask your healthcare professional. [address] any warranty or liability for your use of this information.          We hope that we have addressed all of your medical concerns. The examination and treatment you received in the Emergency Department were for an emergent problem and were not intended as complete care. It is important that you follow up with your healthcare provider(s) for ongoing care. If your symptoms worsen or do not improve as expected, and you are unable to reach your usual health care provider(s), you should return to the Emergency Department. Today's healthcare is undergoing tremendous change, and patient satisfaction surveys are one of the many tools to assess the quality of medical care. You may receive a survey from the [employer] regarding your experience in the Emergency Department. I hope that your experience has been completely positive, particularly the medical care that I provided. As such, please participate in the survey; anything less than excellent does not meet my expectations or intentions. [facility] and [facility] participate in nationally recognized quality of care measures. If your blood pressure is greater than 120/80, as reported below, we urge that you seek medical care to address the potential of high blood pressure, commonly known as hypertension. Hypertension can be hereditary or can be caused by certain medical conditions, pain, stress, or \"white coat syndrome. \"       Please make an appointment with your health care provider(s) for follow up of your Emergency Department visit. VITALS:   Patient Vitals for the past 8 hrs:   Temp Pulse Resp BP SpO2   09/06/17 1315 - 73 16 117/56 98 %   09/06/17 1300 - - - 117/57 98 %   09/06/17 1215 - - - 114/60 97 %   09/06/17 1130 - - - 110/66 96 %   09/06/17 1117 97.8 °F (36.6 °C) 79 15 131/65 97 %          Thank you for allowing us to provide you with medical care today. We realize that you have many choices for your emergency care needs.   Please choose us in the future for any continued health care needs.      Marvel Vela Jose Burbank, [address] [place], [place] [address]y [address].   Office: [phone]            Recent Results (from the past 24 hour(s))   CBC WITH AUTOMATED DIFF    Collection Time: 09/06/17 11:29 AM   Result Value Ref Range    WBC 7.6 3.6 - 11.0 K/uL    RBC 3.49 (L) 3.80 - 5.20 M/uL    HGB 11.1 (L) 11.5 - 16.0 g/dL    HCT 35.3 35.0 - 47.0 %    .1 (H) 80.0 - 99.0 FL    MCH 31.8 26.0 - 34.0 PG    MCHC 31.4 30.0 - 36.5 g/dL    RDW 14.8 (H) 11.5 - 14.5 %    PLATELET 247 113 - 589 K/uL    NEUTROPHILS 61 32 - 75 %    LYMPHOCYTES 25 12 - 49 %    MONOCYTES 10 5 - 13 %    EOSINOPHILS 4 0 - 7 %    BASOPHILS 0 0 - 1 %    ABS. NEUTROPHILS 4.6 1.8 - 8.0 K/UL    ABS. LYMPHOCYTES 1.9 0.8 - 3.5 K/UL    ABS. MONOCYTES 0.8 0.0 - 1.0 K/UL    ABS. EOSINOPHILS 0.3 0.0 - 0.4 K/UL    ABS. BASOPHILS 0.0 0.0 - 0.1 K/UL   METABOLIC PANEL, COMPREHENSIVE    Collection Time: 09/06/17 11:29 AM   Result Value Ref Range    Sodium 139 136 - 145 mmol/L    Potassium 5.0 3.5 - 5.1 mmol/L    Chloride 105 97 - 108 mmol/L    CO2 27 21 - 32 mmol/L    Anion gap 7 5 - 15 mmol/L    Glucose 128 (H) 65 - 100 mg/dL    BUN 26 (H) 6 - 20 MG/DL    Creatinine 1.37 (H) 0.55 - 1.02 MG/DL    BUN/Creatinine ratio 19 12 - 20      GFR est AA 45 (L) >60 ml/min/1.73m2    GFR est non-AA 37 (L) >60 ml/min/1.73m2    Calcium 8.2 (L) 8.5 - 10.1 MG/DL    Bilirubin, total 0.3 0.2 - 1.0 MG/DL    ALT (SGPT) 38 12 - 78 U/L    AST (SGOT) 27 15 - 37 U/L    Alk.  phosphatase 67 45 - 117 U/L    Protein, total 6.8 6.4 - 8.2 g/dL    Albumin 3.4 (L) 3.5 - 5.0 g/dL    Globulin 3.4 2.0 - 4.0 g/dL    A-G Ratio 1.0 (L) 1.1 - 2.2     PROTHROMBIN TIME + INR    Collection Time: 09/06/17 11:29 AM   Result Value Ref Range    INR 1.0 0.9 - 1.1      Prothrombin time 10.2 9.0 - 11.1 sec   PTT    Collection Time: 09/06/17 11:29 AM   Result Value Ref Range    aPTT 27.3 22.1 - 32.5 sec    aPTT, therapeutic range     58.0 - 77.0 SECS   LIPASE    Collection Time: 09/06/17 11:29 AM   Result Value Ref Range    Lipase 136 73 - 393 U/L   MAGNESIUM    Collection Time: 09/06/17 11:29 AM   Result Value Ref Range    Magnesium 2.1 1.6 - 2.4 mg/dL   URINALYSIS W/ REFLEX CULTURE    Collection Time: 09/06/17 12:31 PM   Result Value Ref Range    Color YELLOW/STRAW      Appearance CLEAR CLEAR      Specific gravity 1.010 1.003 - 1.030      pH (UA) 5.5 5.0 - 8.0      Protein NEGATIVE  NEG mg/dL    Glucose NEGATIVE  NEG mg/dL    Ketone NEGATIVE  NEG mg/dL    Bilirubin NEGATIVE  NEG      Blood NEGATIVE  NEG      Urobilinogen 0.2 0.2 - 1.0 EU/dL    Nitrites NEGATIVE  NEG      Leukocyte Esterase NEGATIVE  NEG      WBC 0-4 0 - 4 /hpf    RBC 0-5 0 - 5 /hpf    Epithelial cells FEW FEW /lpf    Bacteria NEGATIVE  NEG /hpf    UA:UC IF INDICATED CULTURE NOT INDICATED BY UA RESULT CNI      Hyaline cast 0-2 0 - 5 /lpf   OCCULT BLOOD, STOOL    Collection Time: 09/06/17 12:41 PM   Result Value Ref Range    Occult blood, stool NEGATIVE  NEG         No results found.

## 2017-09-07 LAB
C DIFF TOX GENS STL QL NAA+PROBE: NEGATIVE
WBC #/AREA STL HPF: NORMAL /HPF (ref 0–4)

## 2017-09-08 LAB
BACTERIA SPEC CULT: NORMAL
C JEJUNI+C COLI AG STL QL: NEGATIVE
E COLI SXT1+2 STL IA: NEGATIVE
SERVICE CMNT-IMP: NORMAL

## 2017-09-14 LAB
O+P SPEC MICRO: NORMAL
O+P STL CONC: NORMAL
SPECIMEN SOURCE: NORMAL

## 2018-03-26 ENCOUNTER — HOSPITAL ENCOUNTER (OUTPATIENT)
Dept: ULTRASOUND IMAGING | Age: 78
Discharge: HOME | End: 2018-03-26
Payer: MEDICARE

## 2018-03-26 DIAGNOSIS — N18.4 CHRONIC KIDNEY DISEASE, STAGE IV (SEVERE) (HCC): ICD-10-CM

## 2018-03-26 PROCEDURE — 76770 US EXAM ABDO BACK WALL COMP: CPT

## 2018-05-11 ENCOUNTER — HOSPITAL ENCOUNTER (EMERGENCY)
Age: 78
Discharge: HOME | End: 2018-05-11
Attending: EMERGENCY MEDICINE
Payer: MEDICARE

## 2018-05-11 VITALS
SYSTOLIC BLOOD PRESSURE: 121 MMHG | HEIGHT: 61 IN | DIASTOLIC BLOOD PRESSURE: 63 MMHG | OXYGEN SATURATION: 100 % | HEART RATE: 68 BPM | TEMPERATURE: 97.7 F | BODY MASS INDEX: 32.66 KG/M2 | RESPIRATION RATE: 14 BRPM | WEIGHT: 173 LBS

## 2018-05-11 DIAGNOSIS — E87.5 ACUTE HYPERKALEMIA: Primary | ICD-10-CM

## 2018-05-11 LAB
ALBUMIN SERPL-MCNC: 3.1 G/DL (ref 3.5–5)
ALBUMIN/GLOB SERPL: 1 {RATIO} (ref 1.1–2.2)
ALP SERPL-CCNC: 56 U/L (ref 45–117)
ALT SERPL-CCNC: 23 U/L (ref 12–78)
ANION GAP SERPL CALC-SCNC: 8 MMOL/L (ref 5–15)
AST SERPL-CCNC: 25 U/L (ref 15–37)
ATRIAL RATE: 62 BPM
BASOPHILS # BLD: 0.1 K/UL (ref 0–0.1)
BASOPHILS NFR BLD: 1 % (ref 0–1)
BILIRUB SERPL-MCNC: 0.2 MG/DL (ref 0.2–1)
BUN SERPL-MCNC: 30 MG/DL (ref 6–20)
BUN/CREAT SERPL: 17 (ref 12–20)
CALCIUM SERPL-MCNC: 8.8 MG/DL (ref 8.5–10.1)
CALCULATED P AXIS, ECG09: 40 DEGREES
CALCULATED R AXIS, ECG10: 44 DEGREES
CALCULATED T AXIS, ECG11: 58 DEGREES
CHLORIDE SERPL-SCNC: 108 MMOL/L (ref 97–108)
CO2 SERPL-SCNC: 25 MMOL/L (ref 21–32)
CREAT SERPL-MCNC: 1.75 MG/DL (ref 0.55–1.02)
DIAGNOSIS, 93000: NORMAL
DIFFERENTIAL METHOD BLD: ABNORMAL
EOSINOPHIL # BLD: 0.5 K/UL (ref 0–0.4)
EOSINOPHIL NFR BLD: 8 % (ref 0–7)
ERYTHROCYTE [DISTWIDTH] IN BLOOD BY AUTOMATED COUNT: 13.6 % (ref 11.5–14.5)
GLOBULIN SER CALC-MCNC: 3.2 G/DL (ref 2–4)
GLUCOSE SERPL-MCNC: 159 MG/DL (ref 65–100)
HCT VFR BLD AUTO: 34 % (ref 35–47)
HGB BLD-MCNC: 11 G/DL (ref 11.5–16)
IMM GRANULOCYTES # BLD: 0 K/UL (ref 0–0.04)
IMM GRANULOCYTES NFR BLD AUTO: 1 % (ref 0–0.5)
LYMPHOCYTES # BLD: 2 K/UL (ref 0.8–3.5)
LYMPHOCYTES NFR BLD: 29 % (ref 12–49)
MCH RBC QN AUTO: 33.1 PG (ref 26–34)
MCHC RBC AUTO-ENTMCNC: 32.4 G/DL (ref 30–36.5)
MCV RBC AUTO: 102.4 FL (ref 80–99)
MONOCYTES # BLD: 0.9 K/UL (ref 0–1)
MONOCYTES NFR BLD: 13 % (ref 5–13)
NEUTS SEG # BLD: 3.4 K/UL (ref 1.8–8)
NEUTS SEG NFR BLD: 49 % (ref 32–75)
NRBC # BLD: 0 K/UL (ref 0–0.01)
NRBC BLD-RTO: 0 PER 100 WBC
P-R INTERVAL, ECG05: 172 MS
PLATELET # BLD AUTO: 208 K/UL (ref 150–400)
PMV BLD AUTO: 10.1 FL (ref 8.9–12.9)
POTASSIUM SERPL-SCNC: 5.8 MMOL/L (ref 3.5–5.1)
PROT SERPL-MCNC: 6.3 G/DL (ref 6.4–8.2)
Q-T INTERVAL, ECG07: 420 MS
QRS DURATION, ECG06: 72 MS
QTC CALCULATION (BEZET), ECG08: 426 MS
RBC # BLD AUTO: 3.32 M/UL (ref 3.8–5.2)
SODIUM SERPL-SCNC: 141 MMOL/L (ref 136–145)
VENTRICULAR RATE, ECG03: 62 BPM
WBC # BLD AUTO: 6.8 K/UL (ref 3.6–11)

## 2018-05-11 PROCEDURE — 85025 COMPLETE CBC W/AUTO DIFF WBC: CPT | Performed by: EMERGENCY MEDICINE

## 2018-05-11 PROCEDURE — 93005 ELECTROCARDIOGRAM TRACING: CPT

## 2018-05-11 PROCEDURE — 36415 COLL VENOUS BLD VENIPUNCTURE: CPT | Performed by: EMERGENCY MEDICINE

## 2018-05-11 PROCEDURE — 80053 COMPREHEN METABOLIC PANEL: CPT | Performed by: EMERGENCY MEDICINE

## 2018-05-11 PROCEDURE — 99284 EMERGENCY DEPT VISIT MOD MDM: CPT

## 2018-05-11 RX ORDER — CIPROFLOXACIN 250 MG/1
250 TABLET, FILM COATED ORAL EVERY 12 HOURS
COMMUNITY
End: 2020-08-19

## 2018-05-11 RX ORDER — DIPHENHYDRAMINE HCL 25 MG
25 CAPSULE ORAL
COMMUNITY
End: 2021-02-08

## 2018-05-11 RX ORDER — SODIUM CHLORIDE 0.9 % (FLUSH) 0.9 %
5-10 SYRINGE (ML) INJECTION EVERY 8 HOURS
Status: DISCONTINUED | OUTPATIENT
Start: 2018-05-11 | End: 2018-05-11 | Stop reason: HOSPADM

## 2018-05-11 RX ORDER — HYDROCHLOROTHIAZIDE 12.5 MG/1
12.5 TABLET ORAL DAILY
COMMUNITY
End: 2021-02-08

## 2018-05-11 RX ORDER — SODIUM POLYSTYRENE SULFONATE 15 G/60ML
30 SUSPENSION ORAL; RECTAL
Status: DISCONTINUED | OUTPATIENT
Start: 2018-05-11 | End: 2018-05-11 | Stop reason: HOSPADM

## 2018-05-11 RX ORDER — SODIUM CHLORIDE 0.9 % (FLUSH) 0.9 %
5-10 SYRINGE (ML) INJECTION AS NEEDED
Status: DISCONTINUED | OUTPATIENT
Start: 2018-05-11 | End: 2018-05-11 | Stop reason: HOSPADM

## 2018-05-11 NOTE — ED PROVIDER NOTES
HPI Comments: 76 y.o. female with past medical history significant for shingles, chronic back pain, hypothyroidism, HTN, hypercholesterolemia, GERD, migraine, skin CA, gout who presents from home for evaluation of abnormal lab results. Pt was referred to see a nephrologist by her PCP after having 1 year of chronic left flank pain. In addition to her blood work and UA done at her nephrologist's office, pt's US yielded negative results, but indicated a UTI. Pt started a course of abx, and had almost completed it but started developing itchy bumps. She received 3 phone calls from her nephrologist today for a referral to the ED due to elevated potassium levels from her blood test. Accompanying sx include feeling light headed. Denies abd pain, n/v, and dizziness. There are no other acute medical concerns at this time. PCP: Ovidio London MD  Nephrologist: Nadine Pruitt MD    Note written by Sandhya Gee, as dictated by Adriana Chirinos MD 12:55 PM      The history is provided by the patient. No  was used. 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  Cataracts, bilateral     Chronic back pain     x1 yr;seeing Chiropractor for a year    Diabetes ([facility] [address]     GERD (gastroesophageal reflux disease)     Gout     Hypercholesterolemia     Hypertension     Hypothyroidism     Migraine     Shingles        Past Surgical History:   Procedure Laterality Date    HX CHOLECYSTECTOMY      Open cholecystectomy.  HX DILATION AND CURETTAGE      HX HERNIA REPAIR      Umbilical herniorrhaphy with mesh.  HX TONSILLECTOMY           Family History:   Problem Relation Age of Onset    Diabetes Mother        Social History     Social History    Marital status:      Spouse name: N/A    Number of children: N/A    Years of education: N/A     Occupational History    Not on file.      Social History Main Topics    Smoking status: Never Smoker    Smokeless tobacco: Never Used    Alcohol use No    Drug use: No    Sexual activity: Not on file     Other Topics Concern    Not on file     Social History Narrative    ** Merged History Encounter **              ALLERGIES: Shellfish derived    Review of Systems   Constitutional: Negative. Negative for appetite change, fever and unexpected weight change. HENT: Negative. Negative for ear pain, hearing loss, nosebleeds, rhinorrhea, sore throat and trouble swallowing. Respiratory: Negative. Negative for cough, chest tightness and shortness of breath. Cardiovascular: Negative. Negative for chest pain and palpitations. Gastrointestinal: Negative. Negative for abdominal distention, abdominal pain, blood in stool, nausea and vomiting. Endocrine: Negative. Genitourinary: Negative for dysuria and hematuria. Musculoskeletal: Negative. Negative for back pain and myalgias. Skin: Negative. Negative for rash. Allergic/Immunologic: Negative. Neurological: Positive for light-headedness. Negative for dizziness, syncope, weakness and numbness. Hematological: Negative. Psychiatric/Behavioral: Negative. All other systems reviewed and are negative. Vitals:    05/11/18 1235   BP: 121/63   Pulse: 68   Resp: 14   Temp: 97.7 °F (36.5 °C)   SpO2: 100%   Weight: 78.5 kg (173 lb)   Height: 5' 1\" (1.549 m)            Physical Exam   Constitutional: She is oriented to person, place, and time. She appears well-developed and well-nourished. No distress. HENT:   Head: Normocephalic and atraumatic. Right Ear: External ear normal.   Left Ear: External ear normal.   Nose: Nose normal.   Mouth/Throat: Oropharynx is clear and moist.   Eyes: Conjunctivae and EOM are normal. Pupils are equal, round, and reactive to light. Neck: Normal range of motion. Neck supple. No JVD present. No thyromegaly present.    Cardiovascular: Normal rate, regular rhythm, normal heart sounds and intact distal pulses. No murmur heard. Pulmonary/Chest: Effort normal and breath sounds normal. No respiratory distress. She has no wheezes. She has no rales. Abdominal: Soft. Bowel sounds are normal. She exhibits no distension. There is no tenderness. Musculoskeletal: Normal range of motion. She exhibits no edema. Neurological: She is alert and oriented to person, place, and time. No cranial nerve deficit. Skin: Skin is warm and dry. No rash noted. Psychiatric: She has a normal mood and affect. Her behavior is normal. Thought content normal.      Note written by Sandhya Kruger, as dictated by Hien Bond MD 12:55 PM    [facility]      ED Course       Procedures    ED EKG interpretation:  Rhythm: normal sinus rhythm; Rate (approx.): 62; Axis: normal; ST/T wave: normal; No ectopy    Note written by Sandhya Kruger, as dictated by Hien Bond MD 1:14 PM    CONSULT NOTE:  2:18 PM Hien Bond MD spoke with Dr. Meg Banerjee for Nephrology. Discussed available diagnostic tests and clinical findings. Dr. Joelle Beavers recommends administering 1 dose of Kayexalate and discharging home; Will f/u in a couple days.

## 2018-05-11 NOTE — ED TRIAGE NOTES
Pt sent here by nephrologist for high potassium level, blood work done yesterday. Pt does not have the results with her. +dizziness. Denies pain, palpitations, or other complaints.

## 2018-05-11 NOTE — DISCHARGE INSTRUCTIONS
Hyperkalemia: Care Instructions  Your Care Instructions    Hyperkalemia is too much potassium in the blood. Potassium helps keep the right mix of fluids in your body. It also helps your nerves and muscles work as they should. And it keeps your heartbeat in a normal rhythm. Some things can raise potassium levels. These include some health problems, medicines, and kidney problems. (Normally, your kidneys remove extra potassium.)  Too much potassium can cause nausea. It also can cause a heartbeat that isn't normal. But you may not have any symptoms. Too much potassium can be dangerous. That's why it's important to treat it. If you are taking any of the medicines that can raise your levels, your doctor will ask you to stop. You may get medicines to lower your levels. And you may have to limit or not eat foods that have a lot of potassium. Follow-up care is a key part of your treatment and safety. Be sure to make and go to all appointments, and call your doctor if you are having problems. It's also a good idea to know your test results and keep a list of the medicines you take. How can you care for yourself at home? · Take your medicines exactly as prescribed. Call your doctor if you think you are having a problem with your medicine. · Stop taking certain medicines if your doctor asks you to. They may be causing your high potassium levels. If you have concerns about stopping medicine, talk with your doctor. · If you have kidney, heart, or liver disease and have to limit fluids, talk with your doctor before you increase the amount of fluids you drink. If the doctor says it's okay, drink plenty of fluids. This means drinking enough so that your urine is light yellow or clear like water. · Avoid strenuous exercise until your doctor tells you it is okay. · Potassium is in many foods, including vegetables, fruits, and milk products.  Foods high in potassium include bananas, cantaloupe, broccoli, milk, potatoes, and tomatoes. · Low potassium foods include blueberries, raspberries, cucumber, white or brown rice, spaghetti, and macaroni. · Do not use a salt substitute without talking to your doctor first. Most of these are very high in potassium. · Be sure to tell your doctor about any prescription, over-the-counter, or herbal medicines you take. Some of these can raise potassium. When should you call for help? Call 911 anytime you think you may need emergency care. For example, call if:  ? · You passed out (lost consciousness). ? · You have an unusual heartbeat. Your heart may beat fast or skip beats. ?Call your doctor now or seek immediate medical care if:  ? · You have muscle aches. ? · You feel very weak. ? Watch closely for changes in your health, and be sure to contact your doctor if:  ? · You do not get better as expected. Where can you learn more? Go to http://radha-jagdish.info/. Enter A718 in the search box to learn more about \"Hyperkalemia: Care Instructions. \"  Current as of: May 12, 2017  Content Version: 11.4  © 2623-8227 [employer]. Care instructions adapted under license by [employer] (which disclaims liability or warranty for this information). If you have questions about a medical condition or this instruction, always ask your healthcare professional. [address] any warranty or liability for your use of this information. We hope that we have addressed all of your medical concerns. The examination and treatment you received in the Emergency Department were for an emergent problem and were not intended as complete care. It is important that you follow up with your healthcare provider(s) for ongoing care. If your symptoms worsen or do not improve as expected, and you are unable to reach your usual health care provider(s), you should return to the Emergency Department.       Today's healthcare is undergoing tremendous change, and patient satisfaction surveys are one of the many tools to assess the quality of medical care. You may receive a survey from the [employer] regarding your experience in the Emergency Department. I hope that your experience has been completely positive, particularly the medical care that I provided. As such, please participate in the survey; anything less than excellent does not meet my expectations or intentions. [facility] participate in nationally recognized quality of care measures. If your blood pressure is greater than 120/80, as reported below, we urge that you seek medical care to address the potential of high blood pressure, commonly known as hypertension. Hypertension can be hereditary or can be caused by certain medical conditions, pain, stress, or \"white coat syndrome. \"       Please make an appointment with your health care provider(s) for follow up of your Emergency Department visit. VITALS:   Patient Vitals for the past 8 hrs:   Temp Pulse Resp BP SpO2   05/11/18 1235 97.7 °F (36.5 °C) 68 14 121/63 100 %          Thank you for allowing us to provide you with medical care today. We realize that you have many choices for your emergency care needs. Please choose us in the future for any continued health care needs. Regards,           Boris Pearce [facility], [address] [place] [address] 20.   Office: [phone]            Recent Results (from the past 24 hour(s))   METABOLIC PANEL, COMPREHENSIVE    Collection Time: 05/11/18  1:22 PM   Result Value Ref Range    Sodium 141 136 - 145 mmol/L    Potassium 5.8 (H) 3.5 - 5.1 mmol/L    Chloride 108 97 - 108 mmol/L    CO2 25 21 - 32 mmol/L    Anion gap 8 5 - 15 mmol/L    Glucose 159 (H) 65 - 100 mg/dL    BUN 30 (H) 6 - 20 MG/DL    Creatinine 1.75 (H) 0.55 - 1.02 MG/DL    BUN/Creatinine ratio 17 12 - 20      GFR est AA 34 (L) >60 ml/min/1.73m2    GFR est non-AA 28 (L) >60 ml/min/1.73m2    Calcium 8.8 8.5 - 10.1 MG/DL    Bilirubin, total 0.2 0.2 - 1.0 MG/DL    ALT (SGPT) 23 12 - 78 U/L    AST (SGOT) 25 15 - 37 U/L    Alk. phosphatase 56 45 - 117 U/L    Protein, total 6.3 (L) 6.4 - 8.2 g/dL    Albumin 3.1 (L) 3.5 - 5.0 g/dL    Globulin 3.2 2.0 - 4.0 g/dL    A-G Ratio 1.0 (L) 1.1 - 2.2     CBC WITH AUTOMATED DIFF    Collection Time: 05/11/18  1:22 PM   Result Value Ref Range    WBC 6.8 3.6 - 11.0 K/uL    RBC 3.32 (L) 3.80 - 5.20 M/uL    HGB 11.0 (L) 11.5 - 16.0 g/dL    HCT 34.0 (L) 35.0 - 47.0 %    .4 (H) 80.0 - 99.0 FL    MCH 33.1 26.0 - 34.0 PG    MCHC 32.4 30.0 - 36.5 g/dL    RDW 13.6 11.5 - 14.5 %    PLATELET 420 686 - 498 K/uL    MPV 10.1 8.9 - 12.9 FL    NRBC 0.0 0  WBC    ABSOLUTE NRBC 0.00 0.00 - 0.01 K/uL    NEUTROPHILS 49 32 - 75 %    LYMPHOCYTES 29 12 - 49 %    MONOCYTES 13 5 - 13 %    EOSINOPHILS 8 (H) 0 - 7 %    BASOPHILS 1 0 - 1 %    IMMATURE GRANULOCYTES 1 (H) 0.0 - 0.5 %    ABS. NEUTROPHILS 3.4 1.8 - 8.0 K/UL    ABS. LYMPHOCYTES 2.0 0.8 - 3.5 K/UL    ABS. MONOCYTES 0.9 0.0 - 1.0 K/UL    ABS. EOSINOPHILS 0.5 (H) 0.0 - 0.4 K/UL    ABS. BASOPHILS 0.1 0.0 - 0.1 K/UL    ABS. IMM. GRANS. 0.0 0.00 - 0.04 K/UL    DF AUTOMATED         No results found.

## 2018-09-13 ENCOUNTER — HOSPITAL ENCOUNTER (OUTPATIENT)
Dept: VASCULAR SURGERY | Age: 78
Discharge: HOME | End: 2018-09-13
Attending: FAMILY MEDICINE
Payer: MEDICARE

## 2018-09-13 DIAGNOSIS — R93.89 ABNORMAL RADIOLOGICAL FINDINGS IN SKIN AND SUBCUTANEOUS TISSUE: ICD-10-CM

## 2018-09-13 PROCEDURE — 93880 EXTRACRANIAL BILAT STUDY: CPT

## 2018-09-13 NOTE — PROCEDURES
[address]  *** FINAL REPORT ***    Name: Glen Muñoz  MRN: [id number]    Outpatient  : 09 Oct 1940  HIS Order #: [id number]  [address] [facility] Visit #: [id number]  Date: 13 Sep 2018    TYPE OF TEST: Cerebrovascular Duplex    REASON FOR TEST  Atherosclerosis other arteries    Right Carotid:-             Proximal               Mid                 Distal  cm/s  Systolic  Diastolic  Systolic  Diastolic  Systolic  Diastolic  CCA:     29.1      18.7                            73.6      13.9  Bulb:  ECA:    109.1      10.1  ICA:     81.6      19.2       90.1      28.3       76.3      25.5  ICA/CCA:  1.1       1.4    ICA Stenosis: <50%    Right Vertebral:-  Finding: Antegrade  Sys:       35.4  Angelina:        9.2    Right Subclavian:    Left Carotid:-            Proximal                Mid                 Distal  cm/s  Systolic  Diastolic  Systolic  Diastolic  Systolic  Diastolic  CCA:    433.3      22.7                            85.7      20.1  Bulb:  ECA:     75.2       7.0  ICA:     59.4      17.4       66.0      20.1       73.2      23.1  ICA/CCA:  0.7       0.9    ICA Stenosis: <50%    Left Vertebral:-  Finding: Antegrade  Sys:       38.6  Angelina:       10.7    Left Subclavian:    INTERPRETATION/FINDINGS  PROCEDURE:  Evaluation of the extracranial cerebrovascular arteries  with ultrasound (B-mode imaging, pulsed Doppler, color Doppler). Includes the common carotid, internal carotid, external carotid, and  vertebral arteries. FINDINGS: Bilateral intimal thickening noted within the common carotid   arteries. Calcific plaque noted within the right bulb and proximal  internal carotid artery. IMPRESSION: Findings are consistent with 0-49% stenosis of the right  internal carotid and 0-49% stenosis of the left internal carotid. Vertebrals are patent with antegrade flow. ADDITIONAL COMMENTS    I have personally reviewed the data relevant to the interpretation of  this  study.     TECHNOLOGIST: Esequiel Ryder, RVT  Signed: 09/13/2018 04:31 PM    PHYSICIAN: Selam Rose.  Nils Donovan MD  Signed: 09/14/2018 11:06 AM

## 2019-05-08 ENCOUNTER — HOSPITAL ENCOUNTER (OUTPATIENT)
Dept: MAMMOGRAPHY | Age: 79
Discharge: HOME | End: 2019-05-08
Attending: NURSE PRACTITIONER
Payer: MEDICARE

## 2019-05-08 DIAGNOSIS — Z78.0 ASYMPTOMATIC POSTMENOPAUSAL STATUS: ICD-10-CM

## 2019-05-08 DIAGNOSIS — Z13.820 ENCOUNTER FOR SCREENING FOR OSTEOPOROSIS: ICD-10-CM

## 2019-05-08 PROCEDURE — 77080 DXA BONE DENSITY AXIAL: CPT

## 2019-07-14 NOTE — LETTER
Cut the ends of two fingers off on her right hand. NOTIFICATION RETURN TO WORK / SCHOOL 
 
4/26/2017 2:09 PM 
 
Ms. Nicola Garnica [place] [id number] [facility] [id number] To Whom It May Concern: 
 
Nicola Garnica is currently under the care of Leesa Lama. She will return to work/school on: 5/22/17 If there are questions or concerns please have the patient contact our office.  
 
 
 
Sincerely, 
 
 
Hellen Calvo MD

## 2020-01-16 NOTE — PERIOP NOTES
[facility]  PREOPERATIVE INSTRUCTIONS    Surgery Date: 4/13/2017  Surgery arrival time given by surgeon: NO   If no,[facility] [address] staff will call you between 4 PM- 8 PM the day before surgery with your arrival time. If your surgery is on a Monday, we will call you the preceding Friday. Please call [phone] after 8 PM if you did not receive your arrival time. 1. Please report at the designated time to the 2nd [address] [facility]. Bring your insurance card, photo identification, and any copayment ( if applicable). 2. You must have a responsible adult to drive you home. You need to have a responsible adult to stay with you the first 24 hours after surgery if you are going home the same day of your surgery and you should not drive a car for 24 hours following your surgery. 3. Nothing to eat or drink after midnight the night before surgery. This includes no water, gum, mints, coffee, juice, etc.  Please note special instructions, if applicable, below for medications. 4. MEDICATIONS TO TAKE THE MORNING OF SURGERY WITH A SIP OF WATER: none  5. You MAY take your pain medication the day of surgery with a sip of water. 6. No alcoholic beverages 24 hours before or after your surgery. 7. If you are being admitted to the hospital,please leave personal belongings/luggage in your car until you have an assigned hospital room number. 8. Stop Aspirin and/or any non-steroidal anti-inflammatory drugs (i.e. Ibuprofen, Naproxen, Advil, Aleve) as directed by your prescribing physician. You may take Tylenol. Stop herbal supplements 1 week prior to  surgery. 9. If you are currently taking Plavix, Coumadin,or any other blood-thinning/anticoagulant medication contact your prescribing physician for instructions. 10. Please wear comfortable clothes. Wear your glasses instead of contacts. We ask that all money, jewelry and valuables be left at home. Wear no make up, particularly mascara, the day of surgery.    11.  All body piercings, rings,and jewelry need to be removed and left at home. Please wear your hair loose or down. Please no pony-tails, buns, or any metal hair accessories. If you shower the morning of surgery, please do not apply any lotions, powders, or deodorants afterwards. Do not shave any body area within 24 hours of your surgery. 12. Please follow all instructions to avoid any potential surgical cancellation. 13. Should your physical condition change, (i.e. fever, cold, flu, etc.) please notify your surgeon as soon as possible. 14. It is important to be on time. If a situation occurs where you may be delayed, please call:  [phone] on the day of surgery. 15. The Preadmission Testing staff can be reached at 21 [phone]. Jamari Lama 16. Special instructions:   1. Please bring your completed medication sheet with you the day of surgery. Please update any changes in medications. 2. Free  parking  The patient was contacted  in person. She  verbalize  understanding of all instructions does not  need reinforcement. No difficulties

## 2020-02-19 LAB
CREATININE, EXTERNAL: 1.54
HBA1C MFR BLD HPLC: 6.6 %
LDL-C, EXTERNAL: 68

## 2020-07-30 RX ORDER — ATENOLOL 50 MG/1
TABLET ORAL
Qty: 90 TAB | Refills: 1 | Status: SHIPPED | OUTPATIENT
Start: 2020-07-30 | End: 2021-02-01

## 2020-08-11 RX ORDER — SITAGLIPTIN 100 MG/1
TABLET, FILM COATED ORAL
Qty: 90 TAB | Refills: 1 | Status: SHIPPED | OUTPATIENT
Start: 2020-08-11 | End: 2020-12-28

## 2020-08-19 VITALS
HEART RATE: 65 BPM | HEIGHT: 61 IN | WEIGHT: 163 LBS | SYSTOLIC BLOOD PRESSURE: 122 MMHG | DIASTOLIC BLOOD PRESSURE: 80 MMHG | BODY MASS INDEX: 30.78 KG/M2 | TEMPERATURE: 97.4 F | OXYGEN SATURATION: 96 %

## 2020-08-19 RX ORDER — NYSTATIN 100000 [USP'U]/G
POWDER TOPICAL
COMMUNITY
Start: 2020-07-14 | End: 2021-02-08

## 2020-08-19 RX ORDER — CALCIUM CITRATE/VITAMIN D3 200MG-6.25
TABLET ORAL
COMMUNITY
Start: 2020-08-11 | End: 2020-11-11 | Stop reason: SDUPTHER

## 2020-08-19 RX ORDER — CYCLOBENZAPRINE HCL 5 MG
5 TABLET ORAL
COMMUNITY
Start: 2019-07-10 | End: 2021-02-08

## 2020-08-19 RX ORDER — ALENDRONATE SODIUM 70 MG/1
TABLET ORAL
COMMUNITY
Start: 2020-05-29 | End: 2020-11-24

## 2020-08-19 RX ORDER — CLOBETASOL PROPIONATE 0.5 MG/G
OINTMENT TOPICAL
COMMUNITY
Start: 2020-08-10 | End: 2021-02-08

## 2020-08-19 RX ORDER — DICLOFENAC SODIUM 75 MG/1
TABLET, DELAYED RELEASE ORAL
COMMUNITY
Start: 2020-02-14 | End: 2021-02-08

## 2020-08-19 RX ORDER — MAGNESIUM HYDROXIDE 400 MG/5ML
SUSPENSION, ORAL (FINAL DOSE FORM) ORAL
COMMUNITY
Start: 2020-07-24 | End: 2021-02-08

## 2020-08-19 RX ORDER — GLUCOSAM/CHON-MSM1/C/MANG/BOSW 500-416.6
TABLET ORAL
COMMUNITY
Start: 2020-07-07 | End: 2021-02-08

## 2020-08-19 RX ORDER — KETOCONAZOLE 20 MG/G
CREAM TOPICAL AS NEEDED
COMMUNITY
Start: 2020-07-14 | End: 2022-03-24 | Stop reason: ALTCHOICE

## 2020-08-20 ENCOUNTER — OFFICE VISIT (OUTPATIENT)
Dept: FAMILY MEDICINE CLINIC | Age: 80
End: 2020-08-20
Payer: MEDICARE

## 2020-08-20 VITALS
HEIGHT: 62 IN | TEMPERATURE: 97.7 F | WEIGHT: 160 LBS | SYSTOLIC BLOOD PRESSURE: 122 MMHG | HEART RATE: 60 BPM | DIASTOLIC BLOOD PRESSURE: 80 MMHG | BODY MASS INDEX: 29.44 KG/M2 | OXYGEN SATURATION: 98 %

## 2020-08-20 DIAGNOSIS — E78.5 HYPERLIPIDEMIA, UNSPECIFIED HYPERLIPIDEMIA TYPE: ICD-10-CM

## 2020-08-20 DIAGNOSIS — E11.9 TYPE 2 DIABETES MELLITUS WITHOUT COMPLICATION, WITHOUT LONG-TERM CURRENT USE OF INSULIN (HCC): ICD-10-CM

## 2020-08-20 DIAGNOSIS — E03.9 HYPOTHYROIDISM, UNSPECIFIED TYPE: ICD-10-CM

## 2020-08-20 DIAGNOSIS — I10 HYPERTENSION, UNSPECIFIED TYPE: Primary | ICD-10-CM

## 2020-08-20 PROCEDURE — G8419 CALC BMI OUT NRM PARAM NOF/U: HCPCS | Performed by: NURSE PRACTITIONER

## 2020-08-20 PROCEDURE — G8752 SYS BP LESS 140: HCPCS | Performed by: NURSE PRACTITIONER

## 2020-08-20 PROCEDURE — G8536 NO DOC ELDER MAL SCRN: HCPCS | Performed by: NURSE PRACTITIONER

## 2020-08-20 PROCEDURE — 1101F PT FALLS ASSESS-DOCD LE1/YR: CPT | Performed by: NURSE PRACTITIONER

## 2020-08-20 PROCEDURE — G0439 PPPS, SUBSEQ VISIT: HCPCS | Performed by: NURSE PRACTITIONER

## 2020-08-20 PROCEDURE — G8399 PT W/DXA RESULTS DOCUMENT: HCPCS | Performed by: NURSE PRACTITIONER

## 2020-08-20 PROCEDURE — G8427 DOCREV CUR MEDS BY ELIG CLIN: HCPCS | Performed by: NURSE PRACTITIONER

## 2020-08-20 PROCEDURE — G8754 DIAS BP LESS 90: HCPCS | Performed by: NURSE PRACTITIONER

## 2020-08-20 PROCEDURE — G8432 DEP SCR NOT DOC, RNG: HCPCS | Performed by: NURSE PRACTITIONER

## 2020-08-20 NOTE — PROGRESS NOTES
Medicare Wellness Exam:    Chief Complaint   Patient presents with    Annual Wellness Visit     Subsequent      she is a 78y.o. year old female who presents for evaluation for their Medicare Wellness Visit. Patient does test her blood sugars and in the last 2 weeks they have ranged from 54 to 159. She was symptomatic at 64 but immediately got something to eat ( Protein and carbohydrate) and rechecked in 15 minutes and it was up to 80. Patient has no particular complaints at this time and has aged out of the preventative screeings    Fall Screen is completed and assessed=yes  Depression Screen is completed and assessed=yes  Medication list reviewed and adjusted for accuracy=yes  Immunizations reviewed and updated=yes  Health/Preventative Screenings reviewed and updated=yes  ADL Functions reviewed=yes  See scanned medicare wellness documents for full details. Patient Active Problem List    Diagnosis    S/P laparoscopic hernia repair     Robotic-assisted laparoscopic repair of incarcerated, recurrent umbilical hernia with mesh.  Obesity (BMI 30.0-34. 5)   [address] [facility] Hypothyroid    HTN (hypertension)    Hypercholesteremia    GERD (gastroesophageal reflux disease)    DM type 2 (diabetes mellitus, type 2) (HCC)    Hypothyroidism    HTN (hypertension), benign    Hyperlipidemia    Dizziness of unknown cause       Reviewed PmHx, RxHx, FmHx, SocHx, AllgHx and updated and dated in the chart. ROS   ROS as per patient's active problem list    Objective:     Vitals:    08/20/20 1121   BP: 122/80   Pulse: 60   Temp: 97.7 °F (36.5 °C)   TempSrc: Temporal   SpO2: 98%   Weight: 160 lb (72.6 kg)   Height: 5' 1.5\" (1.562 m)     Physical Exam  Vitals signs reviewed. HENT:      Head: Normocephalic. Neck:      Musculoskeletal: Normal range of motion. Cardiovascular:      Rate and Rhythm: Normal rate and regular rhythm. Heart sounds: Normal heart sounds.    Pulmonary:      Effort: Pulmonary effort is normal. Breath sounds: Normal breath sounds. Abdominal:      General: Bowel sounds are normal.      Palpations: Abdomen is soft. Musculoskeletal: Normal range of motion. Skin:     General: Skin is warm and dry. Neurological:      Mental Status: She is alert and oriented to person, place, and time. Psychiatric:         Mood and Affect: Mood normal.         Behavior: Behavior normal.         Thought Content: Thought content normal.         Judgment: Judgment normal.          Assessment/ Plan:   Diagnoses and all orders for this visit:    1. Hypertension, unspecified type    2. Type 2 diabetes mellitus without complication, without long-term current use of insulin (HCC)  -     HEMOGLOBIN A1C WITH EAG  -     METABOLIC PANEL, COMPREHENSIVE  -     CBC WITH AUTOMATED DIFF    3. Hypothyroidism, unspecified type  -     TSH 3RD GENERATION  -     T4, FREE    4. Hyperlipidemia, unspecified hyperlipidemia type  -     LIPID PANEL         -Pain evaluation performed in office  -Cognitive Screen performed in office  -Depression Screen, Fall risks (by up and go test)  and ADL functionality were addressed  -Medication list updated and reviewed for any changes   -A comprehensive review of medical issues and a plan was formulated  -End of life planning was addressed with pt   -Health Screenings for preventions were addressed and a plan was formulated  -Shingles Vaccine was recommended  -Discussed with patient cancer risk factors and appropriate screenings for age  -Patient evaluated for colonoscopy and referred if needed per screeing criteria  -Labs from previous visits were discussed with patient   -Discussed with patient diet and exercise and formulated a plan as needed  -An Advanced care plan was developed with the patient.  -Alcohol screening performed and was negative    -  Follow-up and Dispositions    · Return in about 6 months (around 2/20/2021) for f/u of chronic conditions with fasting labs.          I have discussed the diagnosis with the patient and the intended plan as seen in the above orders. The patient understands and agrees with the plan. The patient has received an after-visit summary and questions were answered concerning future plans. Medication Side Effects and Warnings were discussed with patien  Patient Labs were reviewed and or requested  Patient Past Records were reviewed and or requested    There are no Patient Instructions on file for this visit.       Hollie Wilson NP

## 2020-08-21 LAB
ALBUMIN SERPL-MCNC: 3.9 G/DL (ref 3.6–4.6)
ALBUMIN/GLOB SERPL: 1.9 {RATIO} (ref 1.2–2.2)
ALP SERPL-CCNC: 60 IU/L (ref 39–117)
ALT SERPL-CCNC: 11 IU/L (ref 0–32)
AST SERPL-CCNC: 16 IU/L (ref 0–40)
BASOPHILS # BLD AUTO: 0 X10E3/UL (ref 0–0.2)
BASOPHILS NFR BLD AUTO: 1 %
BILIRUB SERPL-MCNC: 0.3 MG/DL (ref 0–1.2)
BUN SERPL-MCNC: 25 MG/DL (ref 8–27)
BUN/CREAT SERPL: 20 (ref 12–28)
CALCIUM SERPL-MCNC: 9.6 MG/DL (ref 8.7–10.3)
CHLORIDE SERPL-SCNC: 103 MMOL/L (ref 96–106)
CHOLEST SERPL-MCNC: 134 MG/DL (ref 100–199)
CO2 SERPL-SCNC: 25 MMOL/L (ref 20–29)
CREAT SERPL-MCNC: 1.27 MG/DL (ref 0.57–1)
EOSINOPHIL # BLD AUTO: 0.5 X10E3/UL (ref 0–0.4)
EOSINOPHIL NFR BLD AUTO: 8 %
ERYTHROCYTE [DISTWIDTH] IN BLOOD BY AUTOMATED COUNT: 13.6 % (ref 11.7–15.4)
EST. AVERAGE GLUCOSE BLD GHB EST-MCNC: 137 MG/DL
GLOBULIN SER CALC-MCNC: 2.1 G/DL (ref 1.5–4.5)
GLUCOSE SERPL-MCNC: 123 MG/DL (ref 65–99)
HBA1C MFR BLD: 6.4 % (ref 4.8–5.6)
HCT VFR BLD AUTO: 37.5 % (ref 34–46.6)
HDLC SERPL-MCNC: 40 MG/DL
HGB BLD-MCNC: 12.2 G/DL (ref 11.1–15.9)
IMM GRANULOCYTES # BLD AUTO: 0 X10E3/UL (ref 0–0.1)
IMM GRANULOCYTES NFR BLD AUTO: 0 %
LDLC SERPL CALC-MCNC: 61 MG/DL (ref 0–99)
LYMPHOCYTES # BLD AUTO: 1.7 X10E3/UL (ref 0.7–3.1)
LYMPHOCYTES NFR BLD AUTO: 28 %
MCH RBC QN AUTO: 32.6 PG (ref 26.6–33)
MCHC RBC AUTO-ENTMCNC: 32.5 G/DL (ref 31.5–35.7)
MCV RBC AUTO: 100 FL (ref 79–97)
MONOCYTES # BLD AUTO: 0.8 X10E3/UL (ref 0.1–0.9)
MONOCYTES NFR BLD AUTO: 12 %
NEUTROPHILS # BLD AUTO: 3.2 X10E3/UL (ref 1.4–7)
NEUTROPHILS NFR BLD AUTO: 51 %
PLATELET # BLD AUTO: 220 X10E3/UL (ref 150–450)
POTASSIUM SERPL-SCNC: 4.8 MMOL/L (ref 3.5–5.2)
PROT SERPL-MCNC: 6 G/DL (ref 6–8.5)
RBC # BLD AUTO: 3.74 X10E6/UL (ref 3.77–5.28)
SODIUM SERPL-SCNC: 141 MMOL/L (ref 134–144)
T4 FREE SERPL-MCNC: 1.07 NG/DL (ref 0.82–1.77)
TRIGL SERPL-MCNC: 167 MG/DL (ref 0–149)
TSH SERPL DL<=0.005 MIU/L-ACNC: 2.66 UIU/ML (ref 0.45–4.5)
VLDLC SERPL CALC-MCNC: 33 MG/DL (ref 5–40)
WBC # BLD AUTO: 6.2 X10E3/UL (ref 3.4–10.8)

## 2020-08-27 ENCOUNTER — HOSPITAL ENCOUNTER (EMERGENCY)
Age: 80
Discharge: HOME | End: 2020-08-27
Attending: EMERGENCY MEDICINE | Admitting: EMERGENCY MEDICINE
Payer: MEDICARE

## 2020-08-27 ENCOUNTER — APPOINTMENT (OUTPATIENT)
Dept: CT IMAGING | Age: 80
End: 2020-08-27
Attending: EMERGENCY MEDICINE
Payer: MEDICARE

## 2020-08-27 ENCOUNTER — APPOINTMENT (OUTPATIENT)
Dept: GENERAL RADIOLOGY | Age: 80
End: 2020-08-27
Attending: EMERGENCY MEDICINE
Payer: MEDICARE

## 2020-08-27 VITALS
RESPIRATION RATE: 17 BRPM | DIASTOLIC BLOOD PRESSURE: 49 MMHG | WEIGHT: 160 LBS | OXYGEN SATURATION: 92 % | TEMPERATURE: 98.5 F | SYSTOLIC BLOOD PRESSURE: 119 MMHG | BODY MASS INDEX: 30.21 KG/M2 | HEART RATE: 73 BPM | HEIGHT: 61 IN

## 2020-08-27 DIAGNOSIS — K52.9 ENTERITIS: Primary | ICD-10-CM

## 2020-08-27 DIAGNOSIS — R10.817 GENERALIZED ABDOMINAL TENDERNESS WITHOUT REBOUND TENDERNESS: ICD-10-CM

## 2020-08-27 LAB
ALBUMIN SERPL-MCNC: 3.2 G/DL (ref 3.5–5)
ALBUMIN/GLOB SERPL: 1 {RATIO} (ref 1.1–2.2)
ALP SERPL-CCNC: 66 U/L (ref 45–117)
ALT SERPL-CCNC: 21 U/L (ref 12–78)
ANION GAP SERPL CALC-SCNC: 8 MMOL/L (ref 5–15)
APPEARANCE UR: CLEAR
AST SERPL-CCNC: 26 U/L (ref 15–37)
BACTERIA URNS QL MICRO: NEGATIVE /HPF
BASOPHILS # BLD: 0 K/UL (ref 0–0.1)
BASOPHILS NFR BLD: 0 % (ref 0–1)
BILIRUB SERPL-MCNC: 0.3 MG/DL (ref 0.2–1)
BILIRUB UR QL: NEGATIVE
BUN SERPL-MCNC: 28 MG/DL (ref 6–20)
BUN/CREAT SERPL: 21 (ref 12–20)
CALCIUM SERPL-MCNC: 8.9 MG/DL (ref 8.5–10.1)
CHLORIDE SERPL-SCNC: 108 MMOL/L (ref 97–108)
CO2 SERPL-SCNC: 25 MMOL/L (ref 21–32)
COLOR UR: ABNORMAL
COMMENT, HOLDF: NORMAL
CREAT SERPL-MCNC: 1.32 MG/DL (ref 0.55–1.02)
DIFFERENTIAL METHOD BLD: ABNORMAL
EOSINOPHIL # BLD: 0.4 K/UL (ref 0–0.4)
EOSINOPHIL NFR BLD: 4 % (ref 0–7)
EPITH CASTS URNS QL MICRO: ABNORMAL /LPF
ERYTHROCYTE [DISTWIDTH] IN BLOOD BY AUTOMATED COUNT: 14 % (ref 11.5–14.5)
GLOBULIN SER CALC-MCNC: 3.2 G/DL (ref 2–4)
GLUCOSE SERPL-MCNC: 119 MG/DL (ref 65–100)
GLUCOSE UR STRIP.AUTO-MCNC: NEGATIVE MG/DL
HCT VFR BLD AUTO: 36.6 % (ref 35–47)
HGB BLD-MCNC: 12 G/DL (ref 11.5–16)
HGB UR QL STRIP: NEGATIVE
HYALINE CASTS URNS QL MICRO: ABNORMAL /LPF (ref 0–5)
IMM GRANULOCYTES # BLD AUTO: 0 K/UL (ref 0–0.04)
IMM GRANULOCYTES NFR BLD AUTO: 0 % (ref 0–0.5)
KETONES UR QL STRIP.AUTO: NEGATIVE MG/DL
LACTATE SERPL-SCNC: 1.2 MMOL/L (ref 0.4–2)
LEUKOCYTE ESTERASE UR QL STRIP.AUTO: NEGATIVE
LIPASE SERPL-CCNC: 102 U/L (ref 73–393)
LYMPHOCYTES # BLD: 1.4 K/UL (ref 0.8–3.5)
LYMPHOCYTES NFR BLD: 14 % (ref 12–49)
MAGNESIUM SERPL-MCNC: 2.1 MG/DL (ref 1.6–2.4)
MCH RBC QN AUTO: 33.5 PG (ref 26–34)
MCHC RBC AUTO-ENTMCNC: 32.8 G/DL (ref 30–36.5)
MCV RBC AUTO: 102.2 FL (ref 80–99)
MONOCYTES # BLD: 1 K/UL (ref 0–1)
MONOCYTES NFR BLD: 9 % (ref 5–13)
NEUTS SEG # BLD: 7.4 K/UL (ref 1.8–8)
NEUTS SEG NFR BLD: 73 % (ref 32–75)
NITRITE UR QL STRIP.AUTO: NEGATIVE
NRBC # BLD: 0 K/UL (ref 0–0.01)
NRBC BLD-RTO: 0 PER 100 WBC
PH UR STRIP: 5.5 [PH] (ref 5–8)
PLATELET # BLD AUTO: 206 K/UL (ref 150–400)
PMV BLD AUTO: 10.7 FL (ref 8.9–12.9)
POTASSIUM SERPL-SCNC: 4.8 MMOL/L (ref 3.5–5.1)
PROT SERPL-MCNC: 6.4 G/DL (ref 6.4–8.2)
PROT UR STRIP-MCNC: ABNORMAL MG/DL
RBC # BLD AUTO: 3.58 M/UL (ref 3.8–5.2)
RBC #/AREA URNS HPF: ABNORMAL /HPF (ref 0–5)
SAMPLES BEING HELD,HOLD: NORMAL
SODIUM SERPL-SCNC: 141 MMOL/L (ref 136–145)
SP GR UR REFRACTOMETRY: 1.03 (ref 1–1.03)
UR CULT HOLD, URHOLD: NORMAL
UROBILINOGEN UR QL STRIP.AUTO: 0.2 EU/DL (ref 0.2–1)
WBC # BLD AUTO: 10.2 K/UL (ref 3.6–11)
WBC URNS QL MICRO: ABNORMAL /HPF (ref 0–4)

## 2020-08-27 PROCEDURE — 36415 COLL VENOUS BLD VENIPUNCTURE: CPT

## 2020-08-27 PROCEDURE — 74011000636 HC RX REV CODE- 636: Performed by: RADIOLOGY

## 2020-08-27 PROCEDURE — 83605 ASSAY OF LACTIC ACID: CPT

## 2020-08-27 PROCEDURE — 81001 URINALYSIS AUTO W/SCOPE: CPT

## 2020-08-27 PROCEDURE — 83690 ASSAY OF LIPASE: CPT

## 2020-08-27 PROCEDURE — 85025 COMPLETE CBC W/AUTO DIFF WBC: CPT

## 2020-08-27 PROCEDURE — 96375 TX/PRO/DX INJ NEW DRUG ADDON: CPT

## 2020-08-27 PROCEDURE — 99284 EMERGENCY DEPT VISIT MOD MDM: CPT

## 2020-08-27 PROCEDURE — 83735 ASSAY OF MAGNESIUM: CPT

## 2020-08-27 PROCEDURE — 96374 THER/PROPH/DIAG INJ IV PUSH: CPT

## 2020-08-27 PROCEDURE — 74011250637 HC RX REV CODE- 250/637: Performed by: EMERGENCY MEDICINE

## 2020-08-27 PROCEDURE — 74011250636 HC RX REV CODE- 250/636: Performed by: EMERGENCY MEDICINE

## 2020-08-27 PROCEDURE — 80053 COMPREHEN METABOLIC PANEL: CPT

## 2020-08-27 PROCEDURE — 71045 X-RAY EXAM CHEST 1 VIEW: CPT

## 2020-08-27 PROCEDURE — 74177 CT ABD & PELVIS W/CONTRAST: CPT

## 2020-08-27 RX ORDER — ONDANSETRON 2 MG/ML
4 INJECTION INTRAMUSCULAR; INTRAVENOUS
Status: COMPLETED | OUTPATIENT
Start: 2020-08-27 | End: 2020-08-27

## 2020-08-27 RX ORDER — MORPHINE SULFATE 4 MG/ML
4 INJECTION INTRAVENOUS ONCE
Status: COMPLETED | OUTPATIENT
Start: 2020-08-27 | End: 2020-08-27

## 2020-08-27 RX ORDER — AMOXICILLIN AND CLAVULANATE POTASSIUM 875; 125 MG/1; MG/1
1 TABLET, FILM COATED ORAL 2 TIMES DAILY
Qty: 20 TAB | Refills: 0 | Status: SHIPPED | OUTPATIENT
Start: 2020-08-27 | End: 2021-02-08

## 2020-08-27 RX ORDER — AMOXICILLIN AND CLAVULANATE POTASSIUM 875; 125 MG/1; MG/1
1 TABLET, FILM COATED ORAL
Status: COMPLETED | OUTPATIENT
Start: 2020-08-27 | End: 2020-08-27

## 2020-08-27 RX ORDER — DICYCLOMINE HYDROCHLORIDE 10 MG/1
10 CAPSULE ORAL 4 TIMES DAILY
Qty: 20 CAP | Refills: 0 | Status: SHIPPED | OUTPATIENT
Start: 2020-08-27 | End: 2020-09-01

## 2020-08-27 RX ADMIN — SODIUM CHLORIDE 500 ML: 900 INJECTION, SOLUTION INTRAVENOUS at 20:25

## 2020-08-27 RX ADMIN — ONDANSETRON 4 MG: 2 INJECTION INTRAMUSCULAR; INTRAVENOUS at 20:25

## 2020-08-27 RX ADMIN — MORPHINE SULFATE 4 MG: 4 INJECTION INTRAVENOUS at 20:25

## 2020-08-27 RX ADMIN — IOPAMIDOL 100 ML: 755 INJECTION, SOLUTION INTRAVENOUS at 21:23

## 2020-08-27 RX ADMIN — AMOXICILLIN AND CLAVULANATE POTASSIUM 1 TABLET: 875; 125 TABLET, FILM COATED ORAL at 22:55

## 2020-08-27 NOTE — ED TRIAGE NOTES
Pt. Nidia Rojas in by EMS from home for abd pain that has been intermittent for 3 weeks, was seen by PCP and given gas medication which normally helps. Pt states pain increases when she eats. Has vomited about 4 times.

## 2020-08-27 NOTE — ED PROVIDER NOTES
52-year-old female presents from home via EMS with complaint of abdominal pain which is been going on for approximately 1 month but was acutely worse today. She associates this with nausea and vomiting which is new today. No fevers. She has been eating and drinking like normal.  No dysuria. She has been having normal bowel movements. She has previous abdominal hernia surgery. She endorses back pain as well. Abdominal Pain    This is a new problem. The current episode started more than 1 week ago. The problem occurs constantly. The problem has been rapidly worsening. The pain is associated with vomiting. The pain is located in the generalized abdominal region. The pain is severe. Associated symptoms include nausea, vomiting and back pain. Pertinent negatives include no fever, no diarrhea, no constipation, no dysuria, no frequency, no headaches, no arthralgias, no myalgias and no chest pain. Nothing worsens the pain. The pain is relieved by nothing. Past workup includes surgery. Past Medical History:   Diagnosis Date    Anxiety     Cancer [facility])     Skin of face.  Cataracts, bilateral     Chronic back pain     x1 yr;seeing Chiropractor for a year    Diabetes ([address]     GERD (gastroesophageal reflux disease)     Gout     Hypercholesterolemia     Hypertension     Hypothyroidism     Migraine     Shingles        Past Surgical History:   Procedure Laterality Date    HX CATARACT REMOVAL Bilateral 05/2017    HX CHOLECYSTECTOMY      Open cholecystectomy.  HX COLONOSCOPY  06/07/2012    HX COLONOSCOPY  05/30/2010    HX DILATION AND CURETTAGE      HX DILATION AND CURETTAGE      HX ENDOSCOPY  06/07/2012    Ulcers in Antrum     HX HERNIA REPAIR      Umbilical herniorrhaphy with mesh.     HX TONSILLECTOMY      HX TONSILLECTOMY           Family History:   Problem Relation Age of Onset    Diabetes Mother     Alzheimer Mother     Other Mother         Poliosis    Heart Attack Father Social History     Socioeconomic History    Marital status:      Spouse name: Not on file    Number of children: Not on file    Years of education: Not on file    Highest education level: Not on file   Occupational History    Not on file   Social Needs    Financial resource strain: Not on file    Food insecurity     Worry: Not on file     Inability: Not on file    Transportation needs     Medical: Not on file     Non-medical: Not on file   Tobacco Use    Smoking status: Never Smoker    Smokeless tobacco: Never Used   Substance and Sexual Activity    Alcohol use: No    Drug use: No    Sexual activity: Not on file   Lifestyle    Physical activity     Days per week: Not on file     Minutes per session: Not on file    Stress: Not on file   Relationships    Social connections     Talks on phone: Not on file     Gets together: Not on file     Attends [employer] service: Not on file     Active member of club or organization: Not on file     Attends meetings of clubs or organizations: Not on file     Relationship status: Not on file    Intimate partner violence     Fear of current or ex partner: Not on file     Emotionally abused: Not on file     Physically abused: Not on file     Forced sexual activity: Not on file   Other Topics Concern    Not on file   Social History Narrative    ** Merged History Encounter **              ALLERGIES: Shellfish derived    Review of Systems   Constitutional: Negative for fatigue and fever. HENT: Negative for sneezing and sore throat. Respiratory: Negative for cough and shortness of breath. Cardiovascular: Negative for chest pain and leg swelling. Gastrointestinal: Positive for abdominal pain, nausea and vomiting. Negative for constipation and diarrhea. Genitourinary: Negative for difficulty urinating, dysuria and frequency. Musculoskeletal: Positive for back pain. Negative for arthralgias and myalgias. Skin: Negative for color change and rash. Neurological: Negative for weakness and headaches. Psychiatric/Behavioral: Negative for agitation and behavioral problems. Vitals:    08/27/20 1950   BP: 139/52   Pulse: 73   Resp: 17   Temp: 98.5 °F (36.9 °C)   SpO2: 99%   Weight: 72.6 kg (160 lb)   Height: 5' 1\" (1.549 m)            Physical Exam  Vitals signs and nursing note reviewed. Constitutional:       General: She is not in acute distress. Appearance: She is well-developed. She is obese. She is not ill-appearing. HENT:      Head: Normocephalic and atraumatic. Nose: Nose normal.      Mouth/Throat:      Mouth: Mucous membranes are moist.      Pharynx: Oropharynx is clear. Eyes:      Extraocular Movements: Extraocular movements intact. Pupils: Pupils are equal, round, and reactive to light. Neck:      Musculoskeletal: Normal range of motion and neck supple. Cardiovascular:      Rate and Rhythm: Normal rate and regular rhythm. Pulses: Normal pulses. Heart sounds: Normal heart sounds. Pulmonary:      Effort: Pulmonary effort is normal.      Breath sounds: Normal breath sounds. Abdominal:      Tenderness: There is generalized abdominal tenderness and tenderness in the epigastric area. There is no guarding or rebound. Musculoskeletal: Normal range of motion. General: No signs of injury. Skin:     General: Skin is warm and dry. Capillary Refill: Capillary refill takes less than 2 seconds. Neurological:      General: No focal deficit present. Mental Status: She is alert and oriented to person, place, and time. Psychiatric:         Mood and Affect: Mood normal.         Behavior: Behavior normal.          MDM  Number of Diagnoses or Management Options  Diagnosis management comments: 56-year-old female presents as above with abdominal pain. Her work-up is remarkable for likely enteritis found on CT scan. No leukocytosis, fever, or hypotension.   Plan to treat as an outpatient with antibiotics given the duration of her symptoms. Amount and/or Complexity of Data Reviewed  Clinical lab tests: reviewed  Tests in the radiology section of CPT®: reviewed  Decide to obtain previous medical records or to obtain history from someone other than the patient: yes    Risk of Complications, Morbidity, and/or Mortality  Presenting problems: moderate  Management options: moderate    Patient Progress  Patient progress: improved         Procedures                           Rhythm: normal sinus rhythm. Rate (approx.): 72. Axis: normal.  ST segment:  No concerning ST elevations or depressions. This EKG was interpreted by Marshall Frank MD,ED Provider.

## 2020-08-28 ENCOUNTER — PATIENT OUTREACH (OUTPATIENT)
Dept: CASE MANAGEMENT | Age: 80
End: 2020-08-28

## 2020-08-28 NOTE — PROGRESS NOTES
Patient contacted regarding recent discharge and COVID-19 risk. Discussed COVID-19 related testing which was not done at this time. Test results were not done. Patient informed of results, if available? n/a    Care Transition Nurse/ Ambulatory Care Manager/ LPN Care Coordinator contacted the patient by telephone to perform post discharge assessment. Verified name and  with patient as identifiers. Patient has following risk factors of: diabetes and chronic kidney disease. CTN/ACM/LPN reviewed discharge instructions, medical action plan and red flags related to discharge diagnosis. Reviewed and educated them on any new and changed medications related to discharge diagnosis. Advised obtaining a 90-day supply of all daily and as-needed medications. Advance Care Planning:   Does patient have an Advance Directive: not on file Patient stated she has not completed any ACP documents at this time. Patient stated she will discuss with her son, Bri Valle ( [phone])  who is her medical agent. Education provided regarding infection prevention, and signs and symptoms of COVID-19 and when to seek medical attention with patient who verbalized understanding. Discussed [facility] at [facility] severe illness  and given an opportunity for questions and concerns. The patient agrees to contact the COVID-19 hotline [phone] or PCP office for questions related to their healthcare. CTN/ACM/LPN provided contact information for future reference. From CDC: Are you at higher risk for severe illness?  Wash your hands often.  Avoid close contact (6 feet, which is about two arm lengths) with people who are sick.  Put distance between yourself and other people if COVID-19 is spreading in your community.  Clean and disinfect frequently touched surfaces.  Avoid all cruise travel and non-essential air travel.    Call your healthcare professional if you have concerns about COVID-19 and your underlying condition or if you are sick. For more information on steps you can take to protect yourself, see CDC's How to Protect Yourself      Patient/family/caregiver given information for [employer] [facility] and agrees to enroll no  Patient's preferred e-mail:  n/a  Patient's preferred phone number: n/a      Plan to follow up in 14 days based on severity of symptoms and risk factors.  DMB

## 2020-08-28 NOTE — ACP (ADVANCE CARE PLANNING)
Advance Care Planning:   Does patient have an Advance Directive: not on file Patient stated she has not completed any ACP documents at this time. Patient stated she will discuss with her son, Greg Quach ( [phone])  who is her medical agent.

## 2020-09-16 ENCOUNTER — PATIENT OUTREACH (OUTPATIENT)
Dept: CASE MANAGEMENT | Age: 80
End: 2020-09-16

## 2020-09-16 NOTE — PROGRESS NOTES
Patient resolved from Transition of Care episode on 9/16/20. ACM/CTN was unsuccessful at contacting this patient today. Patient/family was provided the following resources and education related to COVID-19 during the initial call:                         Signs, symptoms and red flags related to COVID-19            CDC exposure and quarantine guidelines            [facility] exposure contact - [phone]            Contact for their local Department of Health                 Patient has not had any additional ED or hospital visits. No further outreach scheduled with this CTN/ACM. Episode of Care resolved. Patient has this CTN/ACM contact information if future needs arise.  HATTIEB

## 2020-09-26 NOTE — PROGRESS NOTES
Your A1C is steadily declining which is good news. It was 6.6 at your last set of labs and it is now 6.4. Your triglycerides have also decreased from 261 to 167 and your bad cholesterol is WNL where it was elevated at your last set of labs so that it also good news. In your kidney labs there is also improvement but still continue to stay away from drugs like Ibuprofen, motrin and advil as these tend to be detrimental to kidney function. Your other labs are essentially WNL.   So whatever you are doing please continue

## 2020-11-04 RX ORDER — SIMVASTATIN 10 MG/1
TABLET, FILM COATED ORAL
Qty: 90 TAB | Refills: 1 | Status: SHIPPED | OUTPATIENT
Start: 2020-11-04 | End: 2021-04-30

## 2020-11-10 ENCOUNTER — TELEPHONE (OUTPATIENT)
Dept: FAMILY MEDICINE CLINIC | Age: 80
End: 2020-11-10

## 2020-11-10 DIAGNOSIS — E11.9 TYPE 2 DIABETES MELLITUS WITHOUT COMPLICATION, WITHOUT LONG-TERM CURRENT USE OF INSULIN (HCC): Primary | ICD-10-CM

## 2020-11-10 NOTE — TELEPHONE ENCOUNTER
Pt phoned stating that she needs a refill on:  1) True Metrix Glucose Test Strip  Please send to [facility] in [place]

## 2020-11-11 RX ORDER — CALCIUM CITRATE/VITAMIN D3 200MG-6.25
TABLET ORAL
Qty: 100 STRIP | Refills: 2 | Status: SHIPPED | OUTPATIENT
Start: 2020-11-11 | End: 2020-12-08 | Stop reason: SDUPTHER

## 2020-11-24 RX ORDER — ALENDRONATE SODIUM 70 MG/1
TABLET ORAL
Qty: 12 TAB | Refills: 1 | Status: SHIPPED | OUTPATIENT
Start: 2020-11-24 | End: 2021-05-12

## 2020-12-03 DIAGNOSIS — E11.9 TYPE 2 DIABETES MELLITUS WITHOUT COMPLICATION, WITHOUT LONG-TERM CURRENT USE OF INSULIN (HCC): ICD-10-CM

## 2020-12-08 NOTE — TELEPHONE ENCOUNTER
Requested Prescriptions     Pending Prescriptions Disp Refills    True Metrix Glucose Test Strip strip 100 Strip 2     Sig: Test blood sugar once daily     Pt stated that she called her pharmacy and they stated a refill of on her test strip was not sent.  Pt would like them to be sent to [facility]

## 2020-12-09 RX ORDER — CALCIUM CITRATE/VITAMIN D3 200MG-6.25
TABLET ORAL
Qty: 100 STRIP | Refills: 2 | Status: SHIPPED | OUTPATIENT
Start: 2020-12-09 | End: 2021-02-08

## 2020-12-28 RX ORDER — SITAGLIPTIN 100 MG/1
TABLET, FILM COATED ORAL
Qty: 90 TAB | Refills: 1 | Status: SHIPPED | OUTPATIENT
Start: 2020-12-28 | End: 2021-08-02

## 2020-12-28 RX ORDER — ALLOPURINOL 100 MG/1
TABLET ORAL
Qty: 180 TAB | Refills: 1 | Status: SHIPPED | OUTPATIENT
Start: 2020-12-28 | End: 2021-02-08

## 2021-01-14 RX ORDER — LEVOTHYROXINE SODIUM 50 UG/1
50 TABLET ORAL
Qty: 90 TAB | Refills: 1 | Status: SHIPPED | OUTPATIENT
Start: 2021-01-14 | End: 2021-07-08

## 2021-02-01 RX ORDER — ATENOLOL 50 MG/1
TABLET ORAL
Qty: 90 TAB | Refills: 1 | Status: SHIPPED | OUTPATIENT
Start: 2021-02-01 | End: 2021-02-08

## 2021-02-08 ENCOUNTER — APPOINTMENT (OUTPATIENT)
Dept: NON INVASIVE DIAGNOSTICS | Age: 81
DRG: 378 | End: 2021-02-08
Attending: HOSPITALIST
Payer: MEDICARE

## 2021-02-08 ENCOUNTER — APPOINTMENT (OUTPATIENT)
Dept: NUCLEAR MEDICINE | Age: 81
DRG: 378 | End: 2021-02-08
Attending: SPECIALIST
Payer: MEDICARE

## 2021-02-08 ENCOUNTER — APPOINTMENT (OUTPATIENT)
Dept: CT IMAGING | Age: 81
DRG: 378 | End: 2021-02-08
Attending: EMERGENCY MEDICINE
Payer: MEDICARE

## 2021-02-08 ENCOUNTER — APPOINTMENT (OUTPATIENT)
Dept: VASCULAR SURGERY | Age: 81
DRG: 378 | End: 2021-02-08
Attending: INTERNAL MEDICINE
Payer: MEDICARE

## 2021-02-08 ENCOUNTER — HOSPITAL ENCOUNTER (INPATIENT)
Age: 81
LOS: 2 days | Discharge: HOME | DRG: 378 | End: 2021-02-10
Attending: EMERGENCY MEDICINE | Admitting: HOSPITALIST
Payer: MEDICARE

## 2021-02-08 ENCOUNTER — APPOINTMENT (OUTPATIENT)
Dept: GENERAL RADIOLOGY | Age: 81
DRG: 378 | End: 2021-02-08
Attending: EMERGENCY MEDICINE
Payer: MEDICARE

## 2021-02-08 ENCOUNTER — HOSPITAL ENCOUNTER (OUTPATIENT)
Dept: NUCLEAR MEDICINE | Age: 81
Discharge: HOME | DRG: 378 | End: 2021-02-08
Attending: SPECIALIST
Payer: MEDICARE

## 2021-02-08 ENCOUNTER — APPOINTMENT (OUTPATIENT)
Dept: NON INVASIVE DIAGNOSTICS | Age: 81
DRG: 378 | End: 2021-02-08
Attending: SPECIALIST
Payer: MEDICARE

## 2021-02-08 DIAGNOSIS — I10 HTN (HYPERTENSION), BENIGN: ICD-10-CM

## 2021-02-08 DIAGNOSIS — K92.2 ACUTE LOWER GI BLEEDING: Primary | ICD-10-CM

## 2021-02-08 DIAGNOSIS — R55 SYNCOPE, UNSPECIFIED SYNCOPE TYPE: ICD-10-CM

## 2021-02-08 PROBLEM — D62 ACUTE BLOOD LOSS ANEMIA: Status: ACTIVE | Noted: 2021-02-08

## 2021-02-08 PROBLEM — K92.1 MELENA: Status: ACTIVE | Noted: 2021-02-08

## 2021-02-08 LAB
ALBUMIN SERPL-MCNC: 3 G/DL (ref 3.5–5)
ALBUMIN/GLOB SERPL: 0.9 {RATIO} (ref 1.1–2.2)
ALP SERPL-CCNC: 76 U/L (ref 45–117)
ALT SERPL-CCNC: 28 U/L (ref 12–78)
ANION GAP SERPL CALC-SCNC: 6 MMOL/L (ref 5–15)
ANION GAP SERPL CALC-SCNC: 7 MMOL/L (ref 5–15)
APPEARANCE UR: ABNORMAL
APTT PPP: <20 SEC (ref 22.1–31)
AST SERPL-CCNC: 26 U/L (ref 15–37)
ATRIAL RATE: 80 BPM
BACTERIA URNS QL MICRO: NEGATIVE /HPF
BASOPHILS # BLD: 0 K/UL (ref 0–0.1)
BASOPHILS NFR BLD: 0 % (ref 0–1)
BILIRUB SERPL-MCNC: 0.3 MG/DL (ref 0.2–1)
BILIRUB UR QL: NEGATIVE
BUN SERPL-MCNC: 50 MG/DL (ref 6–20)
BUN SERPL-MCNC: 50 MG/DL (ref 6–20)
BUN/CREAT SERPL: 32 (ref 12–20)
BUN/CREAT SERPL: 38 (ref 12–20)
CALCIUM SERPL-MCNC: 7.6 MG/DL (ref 8.5–10.1)
CALCIUM SERPL-MCNC: 8.1 MG/DL (ref 8.5–10.1)
CALCULATED P AXIS, ECG09: 29 DEGREES
CALCULATED R AXIS, ECG10: 33 DEGREES
CALCULATED T AXIS, ECG11: 33 DEGREES
CHLORIDE SERPL-SCNC: 109 MMOL/L (ref 97–108)
CHLORIDE SERPL-SCNC: 113 MMOL/L (ref 97–108)
CO2 SERPL-SCNC: 24 MMOL/L (ref 21–32)
CO2 SERPL-SCNC: 25 MMOL/L (ref 21–32)
COLOR UR: ABNORMAL
COMMENT, HOLDF: NORMAL
COMMENT, HOLDF: NORMAL
CREAT SERPL-MCNC: 1.31 MG/DL (ref 0.55–1.02)
CREAT SERPL-MCNC: 1.57 MG/DL (ref 0.55–1.02)
DIAGNOSIS, 93000: NORMAL
DIFFERENTIAL METHOD BLD: ABNORMAL
ECHO AO ROOT DIAM: 3.01 CM
ECHO AV AREA PEAK VELOCITY: 1.71 CM2
ECHO AV AREA/BSA PEAK VELOCITY: 1 CM2/M2
ECHO AV PEAK GRADIENT: 8.45 MMHG
ECHO AV PEAK VELOCITY: 145.32 CM/S
ECHO EST RA PRESSURE: 3 MMHG
ECHO LA AREA 4C: 15.53 CM2
ECHO LA MAJOR AXIS: 2.87 CM
ECHO LA MINOR AXIS: 1.64 CM
ECHO LA VOL 2C: 37.52 ML (ref 22–52)
ECHO LA VOL 4C: 37.34 ML (ref 22–52)
ECHO LA VOL BP: 43.77 ML (ref 22–52)
ECHO LA VOL/BSA BIPLANE: 25.04 ML/M2 (ref 16–28)
ECHO LA VOLUME INDEX A2C: 21.47 ML/M2 (ref 16–28)
ECHO LA VOLUME INDEX A4C: 21.36 ML/M2 (ref 16–28)
ECHO LV E' LATERAL VELOCITY: 8.28 CENTIMETER/SECOND
ECHO LV E' SEPTAL VELOCITY: 7.48 CENTIMETER/SECOND
ECHO LV INTERNAL DIMENSION DIASTOLIC: 4.11 CM (ref 3.9–5.3)
ECHO LV INTERNAL DIMENSION SYSTOLIC: 2.95 CM
ECHO LV IVSD: 0.72 CM (ref 0.6–0.9)
ECHO LV MASS 2D: 81.3 G (ref 67–162)
ECHO LV MASS INDEX 2D: 46.5 G/M2 (ref 43–95)
ECHO LV POSTERIOR WALL DIASTOLIC: 0.67 CM (ref 0.6–0.9)
ECHO LVOT DIAM: 1.8 CM
ECHO LVOT PEAK GRADIENT: 3.82 MMHG
ECHO LVOT PEAK VELOCITY: 97.78 CM/S
ECHO MV A VELOCITY: 95.03 CENTIMETER/SECOND
ECHO MV E DECELERATION TIME (DT): 244.82 MS
ECHO MV E VELOCITY: 73.03 CENTIMETER/SECOND
ECHO PV PEAK INSTANTANEOUS GRADIENT SYSTOLIC: 1.69 MMHG
ECHO PV REGURGITANT MAX VELOCITY: 64.38 CM/S
ECHO RIGHT VENTRICULAR SYSTOLIC PRESSURE (RVSP): 48.76 MMHG
ECHO RV INTERNAL DIMENSION: 2.91 CM
ECHO RV TAPSE: 2.01 CM (ref 1.5–2)
ECHO TV REGURGITANT MAX VELOCITY: 338.22 CM/S
ECHO TV REGURGITANT PEAK GRADIENT: 45.76 MMHG
EOSINOPHIL # BLD: 0.2 K/UL (ref 0–0.4)
EOSINOPHIL NFR BLD: 2 % (ref 0–7)
EPITH CASTS URNS QL MICRO: ABNORMAL /LPF
ERYTHROCYTE [DISTWIDTH] IN BLOOD BY AUTOMATED COUNT: 13.7 % (ref 11.5–14.5)
GLOBULIN SER CALC-MCNC: 3.3 G/DL (ref 2–4)
GLUCOSE BLD STRIP.AUTO-MCNC: 120 MG/DL (ref 65–100)
GLUCOSE BLD STRIP.AUTO-MCNC: 126 MG/DL (ref 65–100)
GLUCOSE BLD STRIP.AUTO-MCNC: 153 MG/DL (ref 65–100)
GLUCOSE SERPL-MCNC: 179 MG/DL (ref 65–100)
GLUCOSE SERPL-MCNC: 229 MG/DL (ref 65–100)
GLUCOSE UR STRIP.AUTO-MCNC: NEGATIVE MG/DL
HCT VFR BLD AUTO: 24.4 % (ref 35–47)
HCT VFR BLD AUTO: 29.9 % (ref 35–47)
HEMOCCULT STL QL: POSITIVE
HGB BLD-MCNC: 8.1 G/DL (ref 11.5–16)
HGB BLD-MCNC: 9.8 G/DL (ref 11.5–16)
HGB UR QL STRIP: ABNORMAL
HISTORY CHECKED?,CKHIST: NORMAL
IMM GRANULOCYTES # BLD AUTO: 0 K/UL (ref 0–0.04)
IMM GRANULOCYTES NFR BLD AUTO: 0 % (ref 0–0.5)
INR PPP: 1.1 (ref 0.9–1.1)
KETONES UR QL STRIP.AUTO: NEGATIVE MG/DL
LA VOL DISK BP: 38.62 ML (ref 22–52)
LEUKOCYTE ESTERASE UR QL STRIP.AUTO: ABNORMAL
LIPASE SERPL-CCNC: 111 U/L (ref 73–393)
LYMPHOCYTES # BLD: 1.5 K/UL (ref 0.8–3.5)
LYMPHOCYTES NFR BLD: 16 % (ref 12–49)
MAGNESIUM SERPL-MCNC: 1.8 MG/DL (ref 1.6–2.4)
MCH RBC QN AUTO: 33.8 PG (ref 26–34)
MCHC RBC AUTO-ENTMCNC: 32.8 G/DL (ref 30–36.5)
MCV RBC AUTO: 103.1 FL (ref 80–99)
MONOCYTES # BLD: 0.8 K/UL (ref 0–1)
MONOCYTES NFR BLD: 8 % (ref 5–13)
NEUTS SEG # BLD: 6.8 K/UL (ref 1.8–8)
NEUTS SEG NFR BLD: 74 % (ref 32–75)
NITRITE UR QL STRIP.AUTO: NEGATIVE
NRBC # BLD: 0 K/UL (ref 0–0.01)
NRBC BLD-RTO: 0 PER 100 WBC
P-R INTERVAL, ECG05: 150 MS
PH UR STRIP: 6 [PH] (ref 5–8)
PLATELET # BLD AUTO: 206 K/UL (ref 150–400)
PMV BLD AUTO: 10.3 FL (ref 8.9–12.9)
POTASSIUM SERPL-SCNC: 5.2 MMOL/L (ref 3.5–5.1)
POTASSIUM SERPL-SCNC: 5.6 MMOL/L (ref 3.5–5.1)
PROT SERPL-MCNC: 6.3 G/DL (ref 6.4–8.2)
PROT UR STRIP-MCNC: ABNORMAL MG/DL
PROTHROMBIN TIME: 10.9 SEC (ref 9–11.1)
Q-T INTERVAL, ECG07: 390 MS
QRS DURATION, ECG06: 78 MS
QTC CALCULATION (BEZET), ECG08: 449 MS
RBC # BLD AUTO: 2.9 M/UL (ref 3.8–5.2)
RBC #/AREA URNS HPF: ABNORMAL /HPF (ref 0–5)
SAMPLES BEING HELD,HOLD: NORMAL
SAMPLES BEING HELD,HOLD: NORMAL
SERVICE CMNT-IMP: ABNORMAL
SODIUM SERPL-SCNC: 141 MMOL/L (ref 136–145)
SODIUM SERPL-SCNC: 143 MMOL/L (ref 136–145)
SP GR UR REFRACTOMETRY: 1.02 (ref 1–1.03)
THERAPEUTIC RANGE,PTTT: ABNORMAL SECS (ref 58–77)
TROPONIN I SERPL-MCNC: <0.05 NG/ML
UR CULT HOLD, URHOLD: NORMAL
UROBILINOGEN UR QL STRIP.AUTO: 0.2 EU/DL (ref 0.2–1)
VENTRICULAR RATE, ECG03: 80 BPM
WBC # BLD AUTO: 9.4 K/UL (ref 3.6–11)
WBC URNS QL MICRO: >100 /HPF (ref 0–4)
YEAST URNS QL MICRO: PRESENT

## 2021-02-08 PROCEDURE — 82272 OCCULT BLD FECES 1-3 TESTS: CPT

## 2021-02-08 PROCEDURE — 74011250636 HC RX REV CODE- 250/636: Performed by: EMERGENCY MEDICINE

## 2021-02-08 PROCEDURE — 85610 PROTHROMBIN TIME: CPT

## 2021-02-08 PROCEDURE — 93005 ELECTROCARDIOGRAM TRACING: CPT

## 2021-02-08 PROCEDURE — 81001 URINALYSIS AUTO W/SCOPE: CPT

## 2021-02-08 PROCEDURE — 94761 N-INVAS EAR/PLS OXIMETRY MLT: CPT

## 2021-02-08 PROCEDURE — 85025 COMPLETE CBC W/AUTO DIFF WBC: CPT

## 2021-02-08 PROCEDURE — 74011000250 HC RX REV CODE- 250: Performed by: INTERNAL MEDICINE

## 2021-02-08 PROCEDURE — 93306 TTE W/DOPPLER COMPLETE: CPT | Performed by: SPECIALIST

## 2021-02-08 PROCEDURE — 70450 CT HEAD/BRAIN W/O DYE: CPT

## 2021-02-08 PROCEDURE — 74176 CT ABD & PELVIS W/O CONTRAST: CPT

## 2021-02-08 PROCEDURE — 93306 TTE W/DOPPLER COMPLETE: CPT

## 2021-02-08 PROCEDURE — 99285 EMERGENCY DEPT VISIT HI MDM: CPT

## 2021-02-08 PROCEDURE — 83690 ASSAY OF LIPASE: CPT

## 2021-02-08 PROCEDURE — 36415 COLL VENOUS BLD VENIPUNCTURE: CPT

## 2021-02-08 PROCEDURE — 83735 ASSAY OF MAGNESIUM: CPT

## 2021-02-08 PROCEDURE — 80053 COMPREHEN METABOLIC PANEL: CPT

## 2021-02-08 PROCEDURE — 71045 X-RAY EXAM CHEST 1 VIEW: CPT

## 2021-02-08 PROCEDURE — 85730 THROMBOPLASTIN TIME PARTIAL: CPT

## 2021-02-08 PROCEDURE — 99223 1ST HOSP IP/OBS HIGH 75: CPT | Performed by: SPECIALIST

## 2021-02-08 PROCEDURE — 96360 HYDRATION IV INFUSION INIT: CPT

## 2021-02-08 PROCEDURE — 74011250636 HC RX REV CODE- 250/636: Performed by: HOSPITALIST

## 2021-02-08 PROCEDURE — C9113 INJ PANTOPRAZOLE SODIUM, VIA: HCPCS | Performed by: HOSPITALIST

## 2021-02-08 PROCEDURE — 84484 ASSAY OF TROPONIN QUANT: CPT

## 2021-02-08 PROCEDURE — 82962 GLUCOSE BLOOD TEST: CPT

## 2021-02-08 PROCEDURE — 78452 HT MUSCLE IMAGE SPECT MULT: CPT

## 2021-02-08 PROCEDURE — 74011250636 HC RX REV CODE- 250/636: Performed by: SPECIALIST

## 2021-02-08 PROCEDURE — 74011636637 HC RX REV CODE- 636/637: Performed by: INTERNAL MEDICINE

## 2021-02-08 PROCEDURE — 87086 URINE CULTURE/COLONY COUNT: CPT

## 2021-02-08 PROCEDURE — 85018 HEMOGLOBIN: CPT

## 2021-02-08 PROCEDURE — 86850 RBC ANTIBODY SCREEN: CPT

## 2021-02-08 PROCEDURE — 78452 HT MUSCLE IMAGE SPECT MULT: CPT | Performed by: SPECIALIST

## 2021-02-08 PROCEDURE — 93880 EXTRACRANIAL BILAT STUDY: CPT

## 2021-02-08 PROCEDURE — 86923 COMPATIBILITY TEST ELECTRIC: CPT

## 2021-02-08 PROCEDURE — 65660000000 HC RM CCU STEPDOWN

## 2021-02-08 PROCEDURE — 96361 HYDRATE IV INFUSION ADD-ON: CPT

## 2021-02-08 PROCEDURE — 74011250636 HC RX REV CODE- 250/636: Performed by: INTERNAL MEDICINE

## 2021-02-08 RX ORDER — ATENOLOL 50 MG/1
25 TABLET ORAL 2 TIMES DAILY
COMMUNITY
End: 2021-07-26

## 2021-02-08 RX ORDER — FAMOTIDINE 10 MG/1
10 TABLET ORAL
COMMUNITY

## 2021-02-08 RX ORDER — ACETAMINOPHEN 325 MG/1
650 TABLET ORAL
Status: DISCONTINUED | OUTPATIENT
Start: 2021-02-08 | End: 2021-02-10 | Stop reason: HOSPADM

## 2021-02-08 RX ORDER — POLYETHYLENE GLYCOL 3350 17 G/17G
17 POWDER, FOR SOLUTION ORAL DAILY PRN
Status: DISCONTINUED | OUTPATIENT
Start: 2021-02-08 | End: 2021-02-10 | Stop reason: HOSPADM

## 2021-02-08 RX ORDER — ACETAMINOPHEN 650 MG/1
650 SUPPOSITORY RECTAL
Status: DISCONTINUED | OUTPATIENT
Start: 2021-02-08 | End: 2021-02-10 | Stop reason: HOSPADM

## 2021-02-08 RX ORDER — DEXTROSE 50 % IN WATER (D50W) INTRAVENOUS SYRINGE
12.5-25 AS NEEDED
Status: DISCONTINUED | OUTPATIENT
Start: 2021-02-08 | End: 2021-02-10 | Stop reason: HOSPADM

## 2021-02-08 RX ORDER — GLUCOSAM/CHONDRO/HERB 149/HYAL 750-100 MG
1 TABLET ORAL DAILY
COMMUNITY

## 2021-02-08 RX ORDER — ALLOPURINOL 100 MG/1
200 TABLET ORAL
COMMUNITY
End: 2021-09-09

## 2021-02-08 RX ORDER — INSULIN LISPRO 100 [IU]/ML
INJECTION, SOLUTION INTRAVENOUS; SUBCUTANEOUS
Status: DISCONTINUED | OUTPATIENT
Start: 2021-02-08 | End: 2021-02-10 | Stop reason: HOSPADM

## 2021-02-08 RX ORDER — SODIUM CHLORIDE 9 MG/ML
100 INJECTION, SOLUTION INTRAVENOUS CONTINUOUS
Status: DISCONTINUED | OUTPATIENT
Start: 2021-02-08 | End: 2021-02-10 | Stop reason: HOSPADM

## 2021-02-08 RX ORDER — FACIAL-BODY WIPES
10 EACH TOPICAL DAILY PRN
Status: DISCONTINUED | OUTPATIENT
Start: 2021-02-08 | End: 2021-02-10 | Stop reason: HOSPADM

## 2021-02-08 RX ORDER — SIMETHICONE 125 MG
125 TABLET,CHEWABLE ORAL
COMMUNITY
End: 2022-03-24 | Stop reason: ALTCHOICE

## 2021-02-08 RX ORDER — SODIUM CHLORIDE 9 MG/ML
250 INJECTION, SOLUTION INTRAVENOUS AS NEEDED
Status: DISCONTINUED | OUTPATIENT
Start: 2021-02-08 | End: 2021-02-10 | Stop reason: HOSPADM

## 2021-02-08 RX ORDER — ONDANSETRON 2 MG/ML
4 INJECTION INTRAMUSCULAR; INTRAVENOUS
Status: DISCONTINUED | OUTPATIENT
Start: 2021-02-08 | End: 2021-02-10 | Stop reason: HOSPADM

## 2021-02-08 RX ORDER — SODIUM CHLORIDE 0.9 % (FLUSH) 0.9 %
5-40 SYRINGE (ML) INJECTION AS NEEDED
Status: DISCONTINUED | OUTPATIENT
Start: 2021-02-08 | End: 2021-02-10 | Stop reason: HOSPADM

## 2021-02-08 RX ORDER — MAGNESIUM SULFATE 100 %
4 CRYSTALS MISCELLANEOUS AS NEEDED
Status: DISCONTINUED | OUTPATIENT
Start: 2021-02-08 | End: 2021-02-10 | Stop reason: HOSPADM

## 2021-02-08 RX ORDER — ADENOSINE 3 MG/ML
INJECTION, SOLUTION INTRAVENOUS
Status: DISPENSED
Start: 2021-02-08 | End: 2021-02-09

## 2021-02-08 RX ORDER — SODIUM CHLORIDE 0.9 % (FLUSH) 0.9 %
5-40 SYRINGE (ML) INJECTION EVERY 8 HOURS
Status: DISCONTINUED | OUTPATIENT
Start: 2021-02-08 | End: 2021-02-10 | Stop reason: HOSPADM

## 2021-02-08 RX ORDER — CHOLECALCIFEROL (VITAMIN D3) 125 MCG
100 CAPSULE ORAL DAILY
COMMUNITY

## 2021-02-08 RX ADMIN — Medication 10 ML: at 21:27

## 2021-02-08 RX ADMIN — REGADENOSON 0.4 MG: 0.08 INJECTION, SOLUTION INTRAVENOUS at 14:53

## 2021-02-08 RX ADMIN — PANTOPRAZOLE SODIUM 40 MG: 40 INJECTION, POWDER, FOR SOLUTION INTRAVENOUS at 08:33

## 2021-02-08 RX ADMIN — PANTOPRAZOLE SODIUM 40 MG: 40 INJECTION, POWDER, FOR SOLUTION INTRAVENOUS at 21:26

## 2021-02-08 RX ADMIN — CEFTRIAXONE 1 G: 1 INJECTION, POWDER, FOR SOLUTION INTRAMUSCULAR; INTRAVENOUS at 11:00

## 2021-02-08 RX ADMIN — INSULIN LISPRO 2 UNITS: 100 INJECTION, SOLUTION INTRAVENOUS; SUBCUTANEOUS at 17:37

## 2021-02-08 RX ADMIN — SODIUM CHLORIDE 1000 ML: 9 INJECTION, SOLUTION INTRAVENOUS at 01:25

## 2021-02-08 RX ADMIN — SODIUM CHLORIDE 100 ML/HR: 9 INJECTION, SOLUTION INTRAVENOUS at 19:47

## 2021-02-08 RX ADMIN — SODIUM CHLORIDE 100 ML/HR: 9 INJECTION, SOLUTION INTRAVENOUS at 06:49

## 2021-02-08 NOTE — ED NOTES
TRANSFER - OUT REPORT:    Verbal report given to [facility] RN(name) on Mary Campbell  being transferred to S3(unit) for routine progression of care       Report consisted of patients Situation, Background, Assessment and   Recommendations(SBAR). Information from the following report(s) SBAR was reviewed with the receiving nurse. Lines:   Peripheral IV 02/08/21 Right Forearm (Active)       Peripheral IV 02/08/21 Left Antecubital (Active)        Opportunity for questions and clarification was provided.

## 2021-02-08 NOTE — PROGRESS NOTES
[facility]: MED RECONCILIATION      Information obtained from: Patient     Allergies: Shellfish derived    Prior to Admission Medications:     Medication Documentation Review Audit       Reviewed by Margueritte Schilder, AMARID (Pharmacist) on 02/08/21 at 0919      Medication Sig Documenting Provider Last Dose Status Taking? alendronate (FOSAMAX) 70 mg tablet take 1 tablet by mouth every week Lise Perez NP 1/31/2021 Active Yes   allopurinoL (ZYLOPRIM) 100 mg tablet Take 200 mg by mouth daily as needed (When gout acts up). Provider, Historical 1/8/2021 Active Yes   aspirin delayed-release 81 mg tablet Take 81 mg by mouth nightly. Provider, Historical  Active Yes   atenoloL (TENORMIN) 50 mg tablet Take 25 mg by mouth two (2) times a day. Provider, Historical  Active Yes   calcium carbonate (CLARA-SELTZER ANTACID PO) Take 1 Tab by mouth once as needed (Acid Reflux). Provider, Historical  Active Yes   co-enzyme Q-10 (Co Q-10) 100 mg capsule Take 100 mg by mouth daily. Provider, Historical  Active Yes   famotidine (Pepcid AC) 10 mg tablet Take 10 mg by mouth once as needed for Heartburn. Provider, Historical  Active Yes   glipiZIDE (GLUCOTROL) 5 mg tablet take 1 tablet by mouth twice a day Lise Perez NP  Active Yes   Januvia 100 mg tablet take 1 tablet by mouth IN THE [facility]  Active Yes   ketoconazole (NIZORAL) 2 % topical cream as needed. Provider, Historical  Active Yes   levothyroxine (SYNTHROID) 50 mcg tablet Take 1 Tab by mouth Daily (before breakfast). Harriet Guzman MD  Active Yes   multivitamin (ONE A DAY) tablet Take 1 Tab by mouth daily. Dorita Andrews MD  Active Yes   omega 3-DHA-EPA-fish oil 1,000 mg (120 mg-180 mg) capsule Take 1 Cap by mouth daily. Provider, Historical  Active Yes   simethicone (Gas-X Extra Strength) 125 mg chewable tablet Take 125 mg by mouth two (2) times daily as needed for Flatulence.  Provider, Historical  Active Yes   simvastatin (ZOCOR) 10 mg tablet take 1 tablet by mouth at bedtime Eugene Richards NP  Active Yes   vitamin I-H-G-lutein-minerals (OCUVITE) tablet Take 1 Tab by mouth two (2) times a day.  Provider, Historical  Active Yes                  [address] [facility]   Contact: [id number]

## 2021-02-08 NOTE — ACP (ADVANCE CARE PLANNING)
Advance Care Planning     Advance Care Planning (ACP) Physician/NP/PA Conversation      Date of Conversation: 2/8/2021   Diagnosis: syncope    Conducted with: Patient with Decision Making Capacity    Healthcare Decision Maker: Son, Facundo Read    Click here to complete [place] including selection of the Healthcare Decision Maker Relationship (ie \"Primary\")      Care Preferences:    Hospitalization: \"If your health worsens and it becomes clear that your chance of recovery is unlikely, what would be your preference regarding hospitalization? \"  Would want to remain on Ventilator for 3 days, if no improvement, discontinue     Ventilation: \"If you were unable to breathe on your own and your chance of recovery was unlikely, what would be your preference about the use of a ventilator (breathing machine) if it was available to you? \"   See above    Resuscitation: \"In the event your heart stopped as a result of an underlying serious health condition, would you want attempts to be made to restart your heart, or would you prefer a natural death? \"   Full code    Conversation Outcomes / Follow-Up Plan:   Discussed with patient regarding current medical issues, prognosis, and treatments. She lives with her son, Facundo Read, who is POA. No advance directive. Would like to be Full Code. Would only want to remain on ventilator for 3 days.   Goal is to return home     Length of Voluntary ACP Conversation in minutes:  16 minutes    Betty Tobar MD

## 2021-02-08 NOTE — PROGRESS NOTES
Patients son calling to [facility] for update on the patient in [place]. Pt verbally abusive and verbally threatening. Attempted to explain that patient is not in this unit and that we are happy to transfer the call and notify her nurse. The son remained verbally abusive and threatening. Hanging up the phone and the calling again. Security notified.

## 2021-02-08 NOTE — PROGRESS NOTES
[facility] [address] [place] [address]  [phone]      Medical Progress Note      NAME: Daniel Esqueda   :  1940  MRM:  [id number]    Date/Time of service: 2021  8:49 AM       Subjective:     Chief Complaint:  Patient was personally seen and examined by me during this time period. Chart reviewed. No further syncope       Objective:       Vitals:       Last 24hrs VS reviewed since prior progress note.  Most recent are:    Visit Vitals  BP (!) 108/56   Pulse 79   Temp 97.8 °F (36.6 °C)   Resp 16   Ht 5' 2\" (1.575 m)   Wt 73.5 kg (162 lb)   SpO2 97%   BMI 29.63 kg/m²     SpO2 Readings from Last 6 Encounters:   21 97%   20 92%   20 98%   20 96%   18 100%   17 100%            Intake/Output Summary (Last 24 hours) at 2021 0849  Last data filed at 2021 3472  Gross per 24 hour   Intake 1000 ml   Output --   Net 1000 ml        Exam:     Physical Exam:    Gen:  Elderly, frail, NAD  HEENT:  Pink conjunctivae, PERRL, hearing intact to voice, moist mucous membranes  Neck:  Supple, without masses, thyroid non-tender  Resp:  No accessory muscle use, clear breath sounds without wheezes rales or rhonchi  Card:  No murmurs, normal S1, S2 without thrills, bruits or peripheral edema  Abd:  Soft, non-tender, non-distended, normoactive bowel sounds are present  Musc:  No cyanosis or clubbing  Skin:  No rashes  Neuro:  Cranial nerves 3-12 are grossly intact, follows commands appropriately  Psych:  Good insight, oriented to person, place and time, alert    Medications Reviewed: (see below)    Lab Data Reviewed: (see below)    ______________________________________________________________________    Medications:     Current Facility-Administered Medications   Medication Dose Route Frequency    0.9% sodium chloride infusion 250 mL  250 mL IntraVENous PRN    pantoprazole (PROTONIX) 40 mg in 0.9% sodium chloride 10 mL injection  40 mg IntraVENous Q12H    0.9% sodium chloride infusion  100 mL/hr IntraVENous CONTINUOUS    sodium chloride (NS) flush 5-40 mL  5-40 mL IntraVENous Q8H    sodium chloride (NS) flush 5-40 mL  5-40 mL IntraVENous PRN    acetaminophen (TYLENOL) tablet 650 mg  650 mg Oral Q6H PRN    Or    acetaminophen (TYLENOL) suppository 650 mg  650 mg Rectal Q6H PRN    polyethylene glycol (MIRALAX) packet 17 g  17 g Oral DAILY PRN    bisacodyL (DULCOLAX) suppository 10 mg  10 mg Rectal DAILY PRN    ondansetron (ZOFRAN) injection 4 mg  4 mg IntraVENous Q6H PRN     Current Outpatient Medications   Medication Sig    atenoloL (TENORMIN) 50 mg tablet take 1 tablet by mouth once daily    levothyroxine (SYNTHROID) 50 mcg tablet Take 1 Tab by mouth Daily (before breakfast).  allopurinoL (ZYLOPRIM) 100 mg tablet take 2 tablets by mouth once daily    Januvia 100 mg tablet take 1 tablet by mouth IN THE MORNING    True Metrix Glucose Test Strip strip Test blood sugar once daily    alendronate (FOSAMAX) 70 mg tablet take 1 tablet by mouth every week    simvastatin (ZOCOR) 10 mg tablet take 1 tablet by mouth at bedtime    amoxicillin-clavulanate (Augmentin) 875-125 mg per tablet Take 1 Tab by mouth two (2) times a day.  glipiZIDE (GLUCOTROL) 5 mg tablet take 1 tablet by mouth twice a day    docosahexaenoic acid/epa (FISH OIL PO) Take  by mouth.  clobetasoL (TEMOVATE) 0.05 % ointment apply daily to affected area IN VAGINAL AREA    cyclobenzaprine (FLEXERIL) 5 mg tablet Take 5 mg by mouth.     diclofenac EC (VOLTAREN) 75 mg EC tablet take 1 tablet by mouth twice a day if needed for pain    TRUEplus Lancets 30 gauge misc TEST once daily    ketoconazole (NIZORAL) 2 % topical cream apply twice a day to AFFECTED SPOTS IN VAGINAL AREA    Gas Relief, simethicone, 125 mg chewable tablet take 1 tablet by mouth AFTER BREAKFAST AND AT BEDTIME    Nyamyc powder apply twice a day OVER THE TOP OF THE KETOCONAZOLE CREAM IN THE VAGINAL AREA    hydroCHLOROthiazide (HYDRODIURIL) 12.5 mg tablet Take 12.5 mg by mouth daily. Indications: Edema    diphenhydrAMINE (BENADRYL) 25 mg capsule Take 25 mg by mouth every six (6) hours as needed.  omeprazole (PRILOSEC) 20 mg capsule Take 20 mg by mouth daily.  VIT C/E/ZN/COPPR/LUTEIN/ZEAXAN (PRESERVISION AREDS 2 PO) Take 2 Tabs by mouth daily.  aspirin delayed-release 81 mg tablet Take  by mouth daily.  naproxen (NAPROSYN) 500 mg tablet Take 500 mg by mouth as needed.  cofohub-opiycpmcp-xvomxlxassse (MIDRIN) -325 mg capsule Take 1 Cap by mouth four (4) times daily as needed.  aspirin-acetaminophen-caffeine (EXCEDRIN ES) 250-250-65 mg per tablet Take 1 Tab by mouth.  famotidine (PEPCID AC) 10 mg tablet Take 10 mg by mouth two (2) times daily as needed.  ALPRAZolam (XANAX) 0.25 mg tablet Take 0.25 mg by mouth nightly as needed for Anxiety. Take 1/2 to 1 tablet by mouth at bedtime as needed for emotional upset   Indications: ANXIETY    vitamin a-vitamin c-vit e-min (OCUVITE) tablet Take 1 Tab by mouth daily.  coenzyme Q-10 (CO Q-10) 200 mg capsule Take 200 mg by mouth daily.  multivitamin (ONE A DAY) tablet Take 1 Tab by mouth daily.  sitaGLIPtin (JANUVIA) 50 mg tablet Take 50 mg by mouth nightly.  lisinopril (PRINIVIL, ZESTRIL) 5 mg tablet Take 2.5 mg by mouth nightly.  simvastatin (ZOCOR) 20 mg tablet Take 10 mg by mouth nightly.  ibuprofen (MOTRIN) 200 mg tablet Take 200 mg by mouth two (2) times a day.           Lab Review:     Recent Labs     02/08/21  0124   WBC 9.4   HGB 9.8*   HCT 29.9*        Recent Labs     02/08/21  0238 02/08/21  0124    141   K 5.2* 5.6*   * 109*   CO2 24 25   * 229*   BUN 50* 50*   CREA 1.31* 1.57*   CA 7.6* 8.1*   MG  --  1.8   ALB  --  3.0*   TBILI  --  0.3   ALT  --  28   INR  --  1.1     Lab Results   Component Value Date/Time    Glucose (POC) 129 (H) 04/14/2017 07:40 AM    Glucose (POC) 145 (H) 04/13/2017 04:38 PM    Glucose (POC) 138 (H) 04/13/2017 11:35 AM    Glucose (POC) 114 (H) 03/12/2016 07:17 AM    Glucose (POC) 109 (H) 03/11/2016 09:27 PM          Assessment / Plan:     79 yo hx of HTN, DM, hypothyroid, presented w/ syncope, melena    1) Syncope: likely due to GI bleed. Head CT unremarkable. Will obtain echo, carotid dopplers. Consult Cards    2) Acute GI bleed/melena: could be from NSAIDS. Will cont PPI, IVF, NPO. Monitor Hgb closely. Consult GI for EGD/colon    3) HTN: hold BP meds due to syncope. Consult pharm for med rec    4) DM type 2: check A1C. Cont SSI    5) Hypothyroid: cont synthroid    6) UTI: monitor urine Cx.   Start IV CTX     Total time spent with patient: 35 min **I personally saw and examined the patient during this time period**                 Care Plan discussed with: Patient, nursing     Discussed:  Care Plan    Prophylaxis:  SCD's    Disposition:  Home w/Family           ___________________________________________________    Attending Physician: Ana Cristina Patel MD

## 2021-02-08 NOTE — H&P
[facility]  [address] [facility], [facility]  [phone]     Hospitalist Admission Note      NAME: Fabiana Mccartney   :  1940   MRN:  [id number]     Date/Time:  2021 6:09 AM    Patient PCP: Alesha German NP    Emergency Contact:    Extended Emergency Contact Information  Primary Emergency Contact: [address] [facility] Phone: [phone]  Mobile Phone: [phone]  Relation: Son  Secondary Emergency Contact: [address] [facility] Phone: [phone]  Relation: Child      Code: Full Code    Isolation Precautions: There are currently no Active Isolations        Subjective:     CHIEF COMPLAINT: Syncope of melanotic stools    HISTORY OF PRESENT ILLNESS:     Ms. Shaun Almonte is a 78 y.o. female with history that includes DM, HTN, and GERD came to the hospital after experiencing a syncopal episode in which he woke up incontinent of stool which she reported as a melanotic stool. She reports that several minutes before the syncopal episode she was straining to have a bowel movement and was unable to get small amount out. Reported feeling a sudden episode of mild dizziness during that time especially when she stood up. She went to her room and lay down for a moment and decided to go to the kitchen and upon returning to the room later she had a syncopal episode which she estimates possibly 5 to 10 minutes after being in the bathroom. She does not know how long she was on the floor and it was not witnessed. And as mentioned she woke up with melanotic stools. She does report that for the past 1 to 2 weeks her GERD has been acting up. In the ER she was found to have a positive stool guaiac test and her hemoglobin was 9.8 where her baseline is usually 12 or above. She denies abdominal pain. Imaging studies to include the abdomen and the head was unremarkable.     Allergies   Allergen Reactions    Shellfish Derived Other (comments)     \"causes my gout to act up, not an allergy\"       Prior to Admission medications    Medication Sig Start Date End Date Taking? Authorizing Provider   atenoloL (TENORMIN) 50 mg tablet take 1 tablet by mouth once daily 2/1/21   Haroldo Hamilton NP   levothyroxine (SYNTHROID) 50 mcg tablet Take 1 Tab by mouth Daily (before breakfast). 1/14/21   Tessa Miramontes MD   allopurinoL (ZYLOPRIM) 100 mg tablet take 2 tablets by mouth once daily 12/28/20   Haroldo Hamilton NP   Januvia 100 mg tablet take 1 tablet by mouth IN THE MORNING 12/28/20   Haroldo Hamilton NP   True Metrix Glucose Test Strip strip Test blood sugar once daily 12/9/20   Haroldo Hamilton NP   alendronate (FOSAMAX) 70 mg tablet take 1 tablet by mouth every week 11/24/20   Haroldo Hamilton NP   simvastatin (ZOCOR) 10 mg tablet take 1 tablet by mouth at bedtime 11/4/20   Haroldo Hamilton NP   amoxicillin-clavulanate (Augmentin) 875-125 mg per tablet Take 1 Tab by mouth two (2) times a day. 8/27/20   Griffin John MD   glipiZIDE (GLUCOTROL) 5 mg tablet take 1 tablet by mouth twice a day 8/24/20   Haroldo Hamilton NP   docosahexaenoic acid/epa (FISH OIL PO) Take  by mouth. Provider, Historical   clobetasoL (TEMOVATE) 0.05 % ointment apply daily to affected area IN VAGINAL AREA 8/10/20   Provider, Historical   cyclobenzaprine (FLEXERIL) 5 mg tablet Take 5 mg by mouth.  7/10/19   Provider, Historical   diclofenac EC (VOLTAREN) 75 mg EC tablet take 1 tablet by mouth twice a day if needed for pain 2/14/20   Provider, Historical   TRUEplus Lancets 30 gauge misc TEST once daily 7/7/20   Provider, Historical   ketoconazole (NIZORAL) 2 % topical cream apply twice a day to [address] 7/14/20   Provider, Historical   Gas Relief, simethicone, 125 mg chewable tablet take 1 tablet by mouth AFTER BREAKFAST AND AT BEDTIME 7/24/20   Provider, Historical   Nyamyc powder apply twice a day OVER THE TOP OF THE KETOCONAZOLE CREAM IN THE VAGINAL AREA 7/14/20   ProviderSylvain hydroCHLOROthiazide (HYDRODIURIL) 12.5 mg tablet Take 12.5 mg by mouth daily. Indications: Edema    Dorita Andrews MD   diphenhydrAMINE (BENADRYL) 25 mg capsule Take 25 mg by mouth every six (6) hours as needed. Dorita Andrews MD   omeprazole (PRILOSEC) 20 mg capsule Take 20 mg by mouth daily. Provider, Historical   VIT C/E/ZN/COPPR/LUTEIN/ZEAXAN (PRESERVISION AREDS 2 PO) Take 2 Tabs by mouth daily. Provider, Historical   aspirin delayed-release 81 mg tablet Take  by mouth daily. Provider, Historical   naproxen (NAPROSYN) 500 mg tablet Take 500 mg by mouth as needed. Provider, Historical   rnpmyov-ukuwiyhwf-eliruxgxghob (MIDRIN) -325 mg capsule Take 1 Cap by mouth four (4) times daily as needed. Provider, Historical   aspirin-acetaminophen-caffeine (EXCEDRIN ES) 250-250-65 mg per tablet Take 1 Tab by mouth. Provider, Historical   famotidine (PEPCID AC) 10 mg tablet Take 10 mg by mouth two (2) times daily as needed. Provider, Historical   ALPRAZolam (XANAX) 0.25 mg tablet Take 0.25 mg by mouth nightly as needed for Anxiety. Take 1/2 to 1 tablet by mouth at bedtime as needed for emotional upset   Indications: ANXIETY    Provider, Historical   vitamin a-vitamin c-vit e-min (OCUVITE) tablet Take 1 Tab by mouth daily. Dorita Andrews MD   coenzyme Q-10 (CO Q-10) 200 mg capsule Take 200 mg by mouth daily. Dorita Andrews MD   multivitamin (ONE A DAY) tablet Take 1 Tab by mouth daily. Dorita Andrews MD   sitaGLIPtin (JANUVIA) 50 mg tablet Take 50 mg by mouth nightly. Dorita Andrews MD   lisinopril (PRINIVIL, ZESTRIL) 5 mg tablet Take 2.5 mg by mouth nightly. Dorita Andrews MD   simvastatin (ZOCOR) 20 mg tablet Take 10 mg by mouth nightly. Dorita Andrews MD   ibuprofen (MOTRIN) 200 mg tablet Take 200 mg by mouth two (2) times a day. Dorita Andrews MD       Past Medical History:   Diagnosis Date    Anxiety     [facility])     Skin of face.     Cataracts, bilateral     Chronic back pain x1 yr;seeing Chiropractor for a year    Diabetes ([facility] [address]     GERD (gastroesophageal reflux disease)     Gout     Hypercholesterolemia     Hypertension     Hypothyroidism     Migraine     Shingles         Past Surgical History:   Procedure Laterality Date    HX CATARACT REMOVAL Bilateral 05/2017    HX CHOLECYSTECTOMY      Open cholecystectomy.  HX COLONOSCOPY  06/07/2012    HX COLONOSCOPY  05/30/2010    HX DILATION AND CURETTAGE      HX DILATION AND CURETTAGE      HX ENDOSCOPY  06/07/2012    Ulcers in Antrum     HX HERNIA REPAIR      Umbilical herniorrhaphy with mesh.  HX TONSILLECTOMY      HX TONSILLECTOMY         Social History     Tobacco Use    Smoking status: Never Smoker    Smokeless tobacco: Never Used   Substance Use Topics    Alcohol use: No        Family History   Problem Relation Age of Onset    Diabetes Mother     Alzheimer Mother     Other Mother         Poliosis    Heart Attack Father       [facility] and medications were reviewed. Review of Systems (14 point ROS):  (bold if positive, if negative)    Gen:   , , , , , Eyes:  , , ENT:   , , , , CVS:   , , , syncope, , , , Pulm: , , , , GI:       , , , , , GERD x 1 to 2 weeks, melena, :     , , , , MS:     , , , Skin:   , , , , Psy:    , , , , , , Endo: , , , Hem:  , , , Dhiraj:   , , , , [place]:   , , , , , , ,         Objective:      Visit Vitals  BP (!) 107/53   Pulse 80   Temp 97.8 °F (36.6 °C)   Resp 19   Ht 5' 2\" (1.575 m)   Wt 73.5 kg (162 lb)   SpO2 96%   BMI 29.63 kg/m²       Exam:     Physical Exam:    General:  Alert, cooperative, NAD  Head: Normocephalic, atraumatic  Eyes: PERRL and EOMI sclera clear  ENT: Lips, mucosa, and tongue normal.   Neck: supple, no tenderness  Lungs:  CTA with good BS and normal effort  Heart: S1-S2, RRR   Abd: SNTBS(+)  Ext: no cyanosis, no edema    Pulses: 2+ and symmetric  Skin: Skin color, texture, turgor normal. No rashes or lesions  Neuro: Alert and oriented x 4.   Cranial nerves II through XII grossly intact. No motor or sensory deficits noted. Psych: Not anxious, cooperative, normal affect    Labs:    Recent Labs     02/08/21  0124   WBC 9.4   HGB 9.8*   HCT 29.9*        Recent Labs     02/08/21  0238 02/08/21  0124    141   K 5.2* 5.6*   * 109*   CO2 24 25   * 229*   BUN 50* 50*   CREA 1.31* 1.57*   CA 7.6* 8.1*   MG  --  1.8   ALB  --  3.0*   TBILI  --  0.3   ALT  --  28     Lab Results   Component Value Date/Time    Glucose (POC) 129 (H) 04/14/2017 07:40 AM    Glucose (POC) 145 (H) 04/13/2017 04:38 PM     No results for input(s): PH, PCO2, PO2, HCO3, FIO2 in the last 72 hours. Recent Labs     02/08/21  0124   INR 1.1       Radiology and EKG reviewed:     Ct Head Wo Cont    Result Date: 2/8/2021  No acute process. Ct Abd Pelv Wo Cont    Result Date: 2/8/2021  No acute process. Xr Chest Port    Result Date: 2/8/2021  No acute process. I personally reviewed and interpreted the imaging studies and agree with official reading. Discussed with the patient    **Chart reviewed in [facility]**       Assessment/Plan:      Acute gastrointestinal hemorrhage / Melena / Acute blood loss anemia from GIB / GERD all POA: Admit for further workup and treatment. NPO.  IVF. PPI IV. Supportive care. Type & Screen, serial H&H, PT.INR.  GI consult. Mechanical DVT prophylaxis. Syncope prior to admission: Likely vasovagal combined with the GIB. Workup will include anemia, abnormal serum glucose, Orthostatic vitals. Echo ordered but will defer to daytime attending if he wants to follow through. Cardiac monitoring. Consider Neurology and/or cardiology. DM type 2 (diabetes mellitus, type 2) ([facility] [address] (3/10/2016): Monitor blood glucose levels with insulin sliding scale coverage. No basal coverage for now. Monitor for complications. A1C.       Hypothyroidism (3/10/2016): TSH and continue with home levothyroxine dose    HTN (hypertension), benign (3/10/2016): Home BP meds at this time given that her blood pressure is somewhat soft.   However she does have IV meds as needed for elevated BPs     Body mass index is 29.63 kg/m².: 25.0 - 29.9 Overweight        Risk of deterioration: high      Total time spent with patient: 79 Minutes **I personally saw and examined the patient during this time period**                 Care Plan discussed with:  [x]Patient  []Family  []Care Giver  [x]ED Doc  [x]RN  []Specialist  []Care Manager     Discussed:  Code Status and Care Plan    Prophylaxis:  SCD's and H2B/PPI    Probable Disposition:  Home w/Family    Patient was seen:  After Midnight 2/8/2021                ___________________________________________________    Admitting Physician: Kai Melendez MD

## 2021-02-08 NOTE — PROGRESS NOTES
Reason for Admission:                      RUR Score:    16%                 Plan for utilizing home health:      No current needs                    Current Advanced Directive/Advance Care Plan: Full, No ACP                         Transition of Care Plan:                    I spoke with the patient's son since she was off the floor. The son Sury Alarcon [phone] stated she lives with him in a 2 story home with no issues with steps. No prior HH or DME. Patient drives and is independent on all ADL's. Scripts @ [facility] on [address]. Plan:  1. Monitor patient's response to treatment. 2. Son will transport at discharge. 3. Gastro following pt. 4. No current needs. 5. Case Management to follow. [facility] on [facility]    1st IM left at beside for patient. Copy on chart. Pt did not sign. Care Management Interventions  PCP Verified by CM: Yes(Dr Ruther Skiff)  Mode of Transport at Discharge:  Other (see comment)(son)  Transition of Care Consult (CM Consult): Discharge Planning  MyChart Signup: No  Discharge Durable Medical Equipment: No  Health Maintenance Reviewed: Yes  Physical Therapy Consult: No  Occupational Therapy Consult: No  Speech Therapy Consult: No  Current Support Network: Relative's Home  Confirm Follow Up Transport: Self  [place] Resource Information Provided?: No  Discharge Location  Discharge Placement: Home with family assistance      [facility] [place]

## 2021-02-08 NOTE — PROGRESS NOTES
Problem: Falls - Risk of  Goal: *Absence of Falls  Description: Document Lorene Thakkar Fall Risk and appropriate interventions in the flowsheet.   Outcome: Progressing Towards Goal  Note: Fall Risk Interventions:            Medication Interventions: Teach patient to arise slowly, Patient to call before getting OOB, Evaluate medications/consider consulting pharmacy, Bed/chair exit alarm    Elimination Interventions: Bed/chair exit alarm, Call light in reach, Patient to call for help with toileting needs    History of Falls Interventions: Bed/chair exit alarm, Consult care management for discharge planning, Room close to nurse's station, Utilize gait belt for transfer/ambulation, Assess for delayed presentation/identification of injury for 48 hrs (comment for end date)         Problem: Patient Education: Go to Patient Education Activity  Goal: Patient/Family Education  Outcome: Progressing Towards Goal

## 2021-02-08 NOTE — CONSULTS
2/8/2021    Cardiology Consult  Note   [facility] M[facility] , .A.CNicholas.   --------PCP:-Lingler, Ossie Litten, MD   -----Subjective:   Geni Whittaker is a 78 y.o. female  Consult requested from *Hospitalist/Internal Medicine team and ER** for syncope  Patient was watching the Super Bowl last night and then went to the bathroom. Later she stood up and felt lightheaded. Within about 15 to 30 minutes when she was cleaning up in the kitchen she started to feel lightheaded again and then passed out. She woke up on the floor and called for her son who lives downstairs. She was wet and was found to have black tarry stools per patient in her nightgown. She came to the emergency room about 1 AM and was admitted for syncope. She sees Dr. Eitan Dangelo for diabetes has been on what she describes an expensive med of $500 probably [address] [facility] for about 10 years she is known about her diabetes it will occasionally get a blood sugar to 70 but she does not feel this. She lives with her son who has a stroke she is a non-smoker and has no family history of heart disease. She has never had previous syncope. She reports chest pain like a heartburn feeling most nights radiates around her chest she takes an occasional Pepcid AC she is a dyspnea on exertion when she goes up her laundry about 20 steps. She thinks overall from the time she had a bowel movement until she passed out was about 15 or 20 minutes perhaps 30 minutes. CT imaging the abdomen is normal.  CT of the head was normal.  EKG is unavailable currently. Patient has drop in hemoglobin from a baseline of 12-9.8. Initial K was 5.6 is 5.2 initial creatinine was 1.57 with a BUN of 50 the BUN is stated 50 creatinine dropped to 1.31  Prior cardiac history denies  TSH in August was normal    Discussion/Plan/Impression:  1.  Vasovagal or defecation syncope   ---patient had syncope not quite right during the bowel movement but about 15-30 minutes later and then had melena probably this was related vasovagal to the possible upper GI bleeding. 2. GI is to see she is down about 2 g from her usual baseline hemoglobin suspect GI bleed in part because BUN is also up. 3.  CHANEL has been prominent though not changing ; along with associated chest pain and heartburn. Therefore  I would recommend an echocardiogram and nuclear stress test given the clinical findings. There is no acute MI. Troponin x1 is normal  4. Diabetes longstanding on oral meds  5 . We will follow up based on results of echo and nuclear stress test would also recommend orthostasis and continue telemetry monitoring for 24 hours. Lab Results   Component Value Date/Time    Creatinine 1.31 (H) 02/08/2021 02:38 AM      Results for orders placed or performed during the hospital encounter of 05/11/18   EKG, 12 LEAD, INITIAL   Result Value Ref Range    Ventricular Rate 62 BPM    Atrial Rate 62 BPM    P-R Interval 172 ms    QRS Duration 72 ms    Q-T Interval 420 ms    QTC Calculation (Bezet) 426 ms    Calculated P Axis 40 degrees    Calculated R Axis 44 degrees    Calculated T Axis 58 degrees    Diagnosis       Normal sinus rhythm  Normal ECG  Confirmed by Ariel Morales MD., Milla Najera ([id number]) on 5/11/2018 3:09:54 PM         Cardiac Studies/Hx:  No specialty comments available. Medical history notable for  has a past medical history of Anxiety, Cancer ([address] Cataracts, bilateral, Chronic back pain, Diabetes ([address] GERD (gastroesophageal reflux disease), Gout, Hypercholesterolemia, Hypertension, Hypothyroidism, Migraine, and Shingles. Social history notable for  reports that she has never smoked. She has never used smokeless tobacco. She reports that she does not drink alcohol or use drugs. Family history notable for family history includes Alzheimer in her mother; Diabetes in her mother; Heart Attack in her father; Other in her mother.         Past Medical History:   Diagnosis Date    Anxiety     Cancer ([facility] [address]     Skin of face.  Cataracts, bilateral     Chronic back pain     x1 yr;seeing Chiropractor for a year    Diabetes ([facility] [address] [facility]     GERD (gastroesophageal reflux disease)     Gout     Hypercholesterolemia     Hypertension     Hypothyroidism     Migraine     Shingles         ROS-pertinents  negative except as above  The pertinent portions of the medical history,physician and nursing notes, meds,vitals , labs and Ins/Outs,are reviewed in the electronic record  Pt reports   Syncope  and the remainder of a complete multisystem 11 ROS  Are reviewed and negative    Social History     Tobacco Use    Smoking status: Never Smoker    Smokeless tobacco: Never Used   Substance Use Topics    Alcohol use: No    Drug use: No      Family History   Problem Relation Age of Onset    Diabetes Mother     Alzheimer Mother     Other Mother         Poliosis    Heart Attack Father       Prior to Admission Medications   Prescriptions Last Dose Informant Patient Reported? Taking? Januvia 100 mg tablet  Self No Yes   Sig: take 1 tablet by mouth IN THE MORNING   alendronate (FOSAMAX) 70 mg tablet 1/31/2021 Self No Yes   Sig: take 1 tablet by mouth every week   allopurinoL (ZYLOPRIM) 100 mg tablet 1/8/2021 Self Yes Yes   Sig: Take 200 mg by mouth daily as needed (When gout acts up). aspirin delayed-release 81 mg tablet  Self Yes Yes   Sig: Take 81 mg by mouth nightly. atenoloL (TENORMIN) 50 mg tablet  Self Yes Yes   Sig: Take 25 mg by mouth two (2) times a day. calcium carbonate (CLARA-SELTZER ANTACID PO)  Self Yes Yes   Sig: Take 1 Tab by mouth once as needed (Acid Reflux). co-enzyme Q-10 (Co Q-10) 100 mg capsule  Self Yes Yes   Sig: Take 100 mg by mouth daily. famotidine (Pepcid AC) 10 mg tablet  Self Yes Yes   Sig: Take 10 mg by mouth once as needed for Heartburn.    glipiZIDE (GLUCOTROL) 5 mg tablet  Self No Yes   Sig: take 1 tablet by mouth twice a day   ketoconazole (NIZORAL) 2 % topical cream  Self Yes Yes   Sig: as needed. levothyroxine (SYNTHROID) 50 mcg tablet  Self No Yes   Sig: Take 1 Tab by mouth Daily (before breakfast). multivitamin (ONE A DAY) tablet  Self Yes Yes   Sig: Take 1 Tab by mouth daily. omega 3-DHA-EPA-fish oil 1,000 mg (120 mg-180 mg) capsule  Self Yes Yes   Sig: Take 1 Cap by mouth daily. simethicone (Gas-X Extra Strength) 125 mg chewable tablet  Self Yes Yes   Sig: Take 125 mg by mouth two (2) times daily as needed for Flatulence. simvastatin (ZOCOR) 10 mg tablet  Self No Yes   Sig: take 1 tablet by mouth at bedtime   vitamin K-K-F-lutein-minerals (OCUVITE) tablet  Self Yes Yes   Sig: Take 1 Tab by mouth two (2) times a day.       Facility-Administered Medications: None     Allergies   Allergen Reactions    Shellfish Derived Other (comments)     \"causes my gout to act up, not an allergy\"      Objective:    Physical Exam:   Patient Vitals for the past 12 hrs:   Temp Pulse Resp BP SpO2   02/08/21 1100 -- 84 16 (!) 110/43 99 %   02/08/21 1000 -- 84 20 (!) 115/41 96 %   02/08/21 0900 -- 84 14 (!) 130/45 100 %   02/08/21 0800 -- 79 16 (!) 108/56 97 %   02/08/21 0703 -- -- -- -- 96 %   02/08/21 0645 -- 80 16 (!) 115/46 97 %   02/08/21 0530 -- 82 17 (!) 118/45 97 %   02/08/21 0430 -- 80 19 (!) 118/46 96 %   02/08/21 0332 -- 80 19 (!) 107/53 96 %   02/08/21 0315 -- 78 17 (!) 110/37 97 %   02/08/21 0147 -- 77 14 (!) 106/46 96 %   02/08/21 0112 97.8 °F (36.6 °C) 81 14 (!) 104/49 98 %      General:  alert, cooperative, no distress, appears stated age   HEENT, Neck:  NC,AT- no JVD   Chest Wall: inspection normal - no chest wall deformities or tenderness, respiratory effort normal   Lung:   clear to auscultation bilaterally   Heart:    normal rate, regular rhythm, normal S1, S2, no murmurs, rubs, clicks or gallops   Abdomen: nondistended   Extremities: extremities normal, atraumatic, no cyanosis or edema     Last 24hr Input/Output:    Intake/Output Summary (Last 24 hours) at 2/8/2021 1114  Last data filed at 2/8/2021 0989  Gross per 24 hour   Intake 1000 ml   Output --   Net 1000 ml        Data Review:   Recent Results (from the past 24 hour(s))   CBC WITH AUTOMATED DIFF    Collection Time: 02/08/21  1:24 AM   Result Value Ref Range    WBC 9.4 3.6 - 11.0 K/uL    RBC 2.90 (L) 3.80 - 5.20 M/uL    HGB 9.8 (L) 11.5 - 16.0 g/dL    HCT 29.9 (L) 35.0 - 47.0 %    .1 (H) 80.0 - 99.0 FL    MCH 33.8 26.0 - 34.0 PG    MCHC 32.8 30.0 - 36.5 g/dL    RDW 13.7 11.5 - 14.5 %    PLATELET 985 079 - 022 K/uL    MPV 10.3 8.9 - 12.9 FL    NRBC 0.0 0  WBC    ABSOLUTE NRBC 0.00 0.00 - 0.01 K/uL    NEUTROPHILS 74 32 - 75 %    LYMPHOCYTES 16 12 - 49 %    MONOCYTES 8 5 - 13 %    EOSINOPHILS 2 0 - 7 %    BASOPHILS 0 0 - 1 %    IMMATURE GRANULOCYTES 0 0.0 - 0.5 %    ABS. NEUTROPHILS 6.8 1.8 - 8.0 K/UL    ABS. LYMPHOCYTES 1.5 0.8 - 3.5 K/UL    ABS. MONOCYTES 0.8 0.0 - 1.0 K/UL    ABS. EOSINOPHILS 0.2 0.0 - 0.4 K/UL    ABS. BASOPHILS 0.0 0.0 - 0.1 K/UL    ABS. IMM. GRANS. 0.0 0.00 - 0.04 K/UL    DF AUTOMATED     METABOLIC PANEL, COMPREHENSIVE    Collection Time: 02/08/21  1:24 AM   Result Value Ref Range    Sodium 141 136 - 145 mmol/L    Potassium 5.6 (H) 3.5 - 5.1 mmol/L    Chloride 109 (H) 97 - 108 mmol/L    CO2 25 21 - 32 mmol/L    Anion gap 7 5 - 15 mmol/L    Glucose 229 (H) 65 - 100 mg/dL    BUN 50 (H) 6 - 20 MG/DL    Creatinine 1.57 (H) 0.55 - 1.02 MG/DL    BUN/Creatinine ratio 32 (H) 12 - 20      GFR est AA 38 (L) >60 ml/min/1.73m2    GFR est non-AA 32 (L) >60 ml/min/1.73m2    Calcium 8.1 (L) 8.5 - 10.1 MG/DL    Bilirubin, total 0.3 0.2 - 1.0 MG/DL    ALT (SGPT) 28 12 - 78 U/L    AST (SGOT) 26 15 - 37 U/L    Alk.  phosphatase 76 45 - 117 U/L    Protein, total 6.3 (L) 6.4 - 8.2 g/dL    Albumin 3.0 (L) 3.5 - 5.0 g/dL    Globulin 3.3 2.0 - 4.0 g/dL    A-G Ratio 0.9 (L) 1.1 - 2.2     LIPASE    Collection Time: 02/08/21  1:24 AM   Result Value Ref Range    Lipase 111 73 - 393 U/L   PROTHROMBIN TIME + INR    Collection Time: 02/08/21  1:24 AM   Result Value Ref Range    INR 1.1 0.9 - 1.1      Prothrombin time 10.9 9.0 - 11.1 sec   PTT    Collection Time: 02/08/21  1:24 AM   Result Value Ref Range    aPTT <20.0 (L) 22.1 - 31.0 sec    aPTT, therapeutic range     58.0 - 77.0 SECS   TROPONIN I    Collection Time: 02/08/21  1:24 AM   Result Value Ref Range    Troponin-I, Qt. <0.05 <0.05 ng/mL   MAGNESIUM    Collection Time: 02/08/21  1:24 AM   Result Value Ref Range    Magnesium 1.8 1.6 - 2.4 mg/dL   OCCULT BLOOD, STOOL    Collection Time: 02/08/21  1:24 AM   Result Value Ref Range    Occult blood, stool Positive (A) NEG     RBC, ALLOCATE    Collection Time: 02/08/21  1:45 AM   Result Value Ref Range    HISTORY CHECKED? Historical check performed    TYPE & SCREEN    Collection Time: 02/08/21  2:38 AM   Result Value Ref Range    Crossmatch Expiration 02/11/2021,2359     ABO/Rh(D) B POSITIVE     Antibody screen NEG     Unit number [id number]     Blood component type RC LR     Unit division 00     Status of unit ALLOCATED     Crossmatch result Compatible    METABOLIC PANEL, BASIC    Collection Time: 02/08/21  2:38 AM   Result Value Ref Range    Sodium 143 136 - 145 mmol/L    Potassium 5.2 (H) 3.5 - 5.1 mmol/L    Chloride 113 (H) 97 - 108 mmol/L    CO2 24 21 - 32 mmol/L    Anion gap 6 5 - 15 mmol/L    Glucose 179 (H) 65 - 100 mg/dL    BUN 50 (H) 6 - 20 MG/DL    Creatinine 1.31 (H) 0.55 - 1.02 MG/DL    BUN/Creatinine ratio 38 (H) 12 - 20      GFR est AA 47 (L) >60 ml/min/1.73m2    GFR est non-AA 39 (L) >60 ml/min/1.73m2    Calcium 7.6 (L) 8.5 - 10.1 MG/DL   SAMPLES BEING HELD    Collection Time: 02/08/21  2:38 AM   Result Value Ref Range    SAMPLES BEING HELD 1LAV,1BLU     COMMENT        Add-on orders for these samples will be processed based on acceptable specimen integrity and analyte stability, which may vary by analyte.    URINALYSIS W/MICROSCOPIC    Collection Time: 02/08/21  8:38 AM   Result Value Ref Range Color YELLOW/STRAW      Appearance CLOUDY (A) CLEAR      Specific gravity 1.021 1.003 - 1.030      pH (UA) 6.0 5.0 - 8.0      Protein TRACE (A) NEG mg/dL    Glucose Negative NEG mg/dL    Ketone Negative NEG mg/dL    Bilirubin Negative NEG      Blood LARGE (A) NEG      Urobilinogen 0.2 0.2 - 1.0 EU/dL    Nitrites Negative NEG      Leukocyte Esterase LARGE (A) NEG      WBC >100 (H) 0 - 4 /hpf    RBC 0-5 0 - 5 /hpf    Epithelial cells FEW FEW /lpf    Bacteria Negative NEG /hpf    Yeast PRESENT (A) NEG     URINE CULTURE HOLD SAMPLE    Collection Time: 02/08/21  8:38 AM    Specimen: Serum; Urine   Result Value Ref Range    Urine culture hold        Urine on hold in Microbiology dept for 2 days. If unpreserved urine is submitted, it cannot be used for addtional testing after 24 hours, recollection will be required. SAMPLES BEING HELD    Collection Time: 02/08/21  8:38 AM   Result Value Ref Range    SAMPLES BEING HELD NO SAMPLE RECEIVED      COMMENT        Add-on orders for these samples will be processed based on acceptable specimen integrity and analyte stability, which may vary by analyte.    GLUCOSE, POC    Collection Time: 02/08/21 10:58 AM   Result Value Ref Range    Glucose (POC) 126 (H) 65 - 100 mg/dL    Performed by [facility]       Lab Results   Component Value Date/Time    Cholesterol, total 134 08/20/2020 12:08 PM    HDL Cholesterol 40 08/20/2020 12:08 PM    LDL, calculated 61 08/20/2020 12:08 PM    Triglyceride 167 (H) 08/20/2020 12:08 PM      Trung Cerrato MD 2/8/2021

## 2021-02-08 NOTE — PROGRESS NOTES
12:30 - unable to obtain labs due to poor IV access. Left message with ED  to have ED charge nurse call me back and hopefully ED nurse can come to bedside for lab draw.  Will continue to reach out to other resources

## 2021-02-08 NOTE — ED PROVIDER NOTES
Francie Lane is an 77 yo F with cramping abdominal pain, diarrhea and syncope. She states that this evening she had abdominal pain that was crampy and felt like she needed to have a BM. She tried to go to the bathroom and felt lightheaded and so lied down. She started to feel better and got up to go get her medicines that she takes before going to bed and then she awoke on the floor, having had dark watery BM. She called for her son who lives with her who called 911. She denies chest pain. She denies abdominal pain or lightheadedness at this time. Past Medical History:   Diagnosis Date    Anxiety     Cancer [facility])     Skin of face.  Cataracts, bilateral     Chronic back pain     x1 yr;seeing Chiropractor for a year    Diabetes ([facility] [address]     GERD (gastroesophageal reflux disease)     Gout     Hypercholesterolemia     Hypertension     Hypothyroidism     Migraine     Shingles        Past Surgical History:   Procedure Laterality Date    HX CATARACT REMOVAL Bilateral 05/2017    HX CHOLECYSTECTOMY      Open cholecystectomy.  HX COLONOSCOPY  06/07/2012    HX COLONOSCOPY  05/30/2010    HX DILATION AND CURETTAGE      HX DILATION AND CURETTAGE      HX ENDOSCOPY  06/07/2012    Ulcers in Antrum     HX HERNIA REPAIR      Umbilical herniorrhaphy with mesh.     HX TONSILLECTOMY      HX TONSILLECTOMY           Family History:   Problem Relation Age of Onset    Diabetes Mother     Alzheimer Mother     Other Mother         Poliosis    Heart Attack Father        Social History     Socioeconomic History    Marital status:      Spouse name: Not on file    Number of children: Not on file    Years of education: Not on file    Highest education level: Not on file   Occupational History    Not on file   Social Needs    Financial resource strain: Not on file    Food insecurity     Worry: Not on file     Inability: Not on file    Transportation needs     Medical: Not on file Non-medical: Not on file   Tobacco Use    Smoking status: Never Smoker    Smokeless tobacco: Never Used   Substance and Sexual Activity    Alcohol use: No    Drug use: No    Sexual activity: Not on file   Lifestyle    Physical activity     Days per week: Not on file     Minutes per session: Not on file    Stress: Not on file   Relationships    Social connections     Talks on phone: Not on file     Gets together: Not on file     Attends Hindu service: Not on file     Active member of club or organization: Not on file     Attends meetings of clubs or organizations: Not on file     Relationship status: Not on file    Intimate partner violence     Fear of current or ex partner: Not on file     Emotionally abused: Not on file     Physically abused: Not on file     Forced sexual activity: Not on file   Other Topics Concern    Not on file   Social History Narrative    ** Merged History Encounter **              ALLERGIES: Shellfish derived    Review of Systems   Constitutional: Negative for fever. HENT: Negative for sore throat. Eyes: Negative for visual disturbance. Respiratory: Negative for cough. Cardiovascular: Positive for syncope. Negative for chest pain. Gastrointestinal: Positive for abdominal pain and diarrhea. Genitourinary: Negative for dysuria. Musculoskeletal: Negative for back pain. Skin: Negative for rash. Neurological: Positive for syncope and light-headedness. Negative for headaches. Vitals:    02/08/21 0112 02/08/21 0147 02/08/21 0315 02/08/21 0332   BP: (!) 104/49 (!) 106/46 (!) 110/37 (!) 107/53   Pulse: 81 77 78 80   Resp: 14 14 17 19   Temp: 97.8 °F (36.6 °C)      SpO2: 98% 96% 97% 96%   Weight: 73.5 kg (162 lb)      Height: 5' 2\" (1.575 m)               Physical Exam  Vitals signs and nursing note reviewed. Constitutional:       General: She is not in acute distress. Appearance: She is well-developed. HENT:      Head: Normocephalic and atraumatic. Eyes:      Conjunctiva/sclera: Conjunctivae normal.   Neck:      Musculoskeletal: Normal range of motion. Trachea: Phonation normal.   Cardiovascular:      Rate and Rhythm: Normal rate and regular rhythm. Heart sounds: Normal heart sounds. Pulmonary:      Effort: Pulmonary effort is normal. No respiratory distress. Abdominal:      General: There is no distension. Tenderness: There is no abdominal tenderness. There is no guarding. Genitourinary:     Rectum: Guaiac result positive (maroon stool). Musculoskeletal: Normal range of motion. General: No tenderness. Skin:     General: Skin is warm and dry. Neurological:      Mental Status: She is alert. She is not disoriented. Motor: No abnormal muscle tone. ED EKG interpretation:  Rhythm: normal sinus rhythm; and regular . Rate (approx.): 80; Axis: normal; P wave: normal; QRS interval: normal ; ST/T wave: normal; Other findings: normal. This EKG was interpreted by Manish Boswell MD,ED Provider. MDM         2:41 AM  Obtained US guided PIV access of left basilic. Perfect Serve Consult for Admission  3:09 AM    ED Room Number: ER07/07  Patient Name and age:  Morley Like 78 y.o.  female  Working Diagnosis:   1. Acute lower GI bleeding    2. Syncope, unspecified syncope type        COVID-19 Suspicion:  no  Sepsis present:  no  Reassessment needed: N/A  Code Status:  Full Code  Readmission: no  Isolation Requirements:  no  Recommended Level of Care:  telemetry  Department:[facility] ED - [phone]  Other:  Patient with syncope and lower GI bleed with maroon colored stools. Syncope followed episode of abdominal cramping where she attempted to have BM. Woke on the floor in [place] having had dark liquid BM. Rectal exam in ED with maroon, guaiac positive stool. No further BMs while in the ED. HGB9.8. Takes aspirin daily but no other blood thinners.   Initial K appears hemolysed (5.6), repeat drawn and is pending in lab.    Procedures

## 2021-02-08 NOTE — ED NOTES
Verbal shift change report given to Fanny (oncoming nurse) by Brigid (offgoing nurse). Report included the following information SBAR, ED Summary, Intake/Output and Recent Results.

## 2021-02-08 NOTE — ED TRIAGE NOTES
Pt arrives by EMS with c/c of syncope. Woke up after using the bathroom and had an unwitnessed syncopal episode walking down the [place]. Unknown LOC and dark, black diarrhea x1. Pt reports taking baby aspirin. Dizzy in triage.

## 2021-02-09 ENCOUNTER — ANESTHESIA (OUTPATIENT)
Dept: ENDOSCOPY | Age: 81
DRG: 378 | End: 2021-02-09
Payer: MEDICARE

## 2021-02-09 ENCOUNTER — ANESTHESIA EVENT (OUTPATIENT)
Dept: ENDOSCOPY | Age: 81
DRG: 378 | End: 2021-02-09
Payer: MEDICARE

## 2021-02-09 LAB
ALBUMIN SERPL-MCNC: 2.5 G/DL (ref 3.5–5)
ALBUMIN/GLOB SERPL: 1 {RATIO} (ref 1.1–2.2)
ALP SERPL-CCNC: 50 U/L (ref 45–117)
ALT SERPL-CCNC: 17 U/L (ref 12–78)
ANION GAP SERPL CALC-SCNC: 3 MMOL/L (ref 5–15)
AST SERPL-CCNC: 14 U/L (ref 15–37)
BACTERIA SPEC CULT: NORMAL
BASOPHILS # BLD: 0 K/UL (ref 0–0.1)
BASOPHILS NFR BLD: 0 % (ref 0–1)
BILIRUB SERPL-MCNC: 0.4 MG/DL (ref 0.2–1)
BUN SERPL-MCNC: 34 MG/DL (ref 6–20)
BUN/CREAT SERPL: 30 (ref 12–20)
CALCIUM SERPL-MCNC: 7.6 MG/DL (ref 8.5–10.1)
CC UR VC: NORMAL
CHLORIDE SERPL-SCNC: 114 MMOL/L (ref 97–108)
CO2 SERPL-SCNC: 26 MMOL/L (ref 21–32)
COVID-19 RAPID TEST, COVR: NOT DETECTED
CREAT SERPL-MCNC: 1.13 MG/DL (ref 0.55–1.02)
DIFFERENTIAL METHOD BLD: ABNORMAL
EOSINOPHIL # BLD: 0.2 K/UL (ref 0–0.4)
EOSINOPHIL NFR BLD: 5 % (ref 0–7)
ERYTHROCYTE [DISTWIDTH] IN BLOOD BY AUTOMATED COUNT: 14.2 % (ref 11.5–14.5)
EST. AVERAGE GLUCOSE BLD GHB EST-MCNC: 143 MG/DL
FERRITIN SERPL-MCNC: 58 NG/ML (ref 8–252)
FOLATE SERPL-MCNC: 54.2 NG/ML (ref 5–21)
GLOBULIN SER CALC-MCNC: 2.4 G/DL (ref 2–4)
GLUCOSE BLD STRIP.AUTO-MCNC: 110 MG/DL (ref 65–100)
GLUCOSE BLD STRIP.AUTO-MCNC: 151 MG/DL (ref 65–100)
GLUCOSE BLD STRIP.AUTO-MCNC: 204 MG/DL (ref 65–100)
GLUCOSE SERPL-MCNC: 87 MG/DL (ref 65–100)
HBA1C MFR BLD: 6.6 % (ref 4–5.6)
HCT VFR BLD AUTO: 21.3 % (ref 35–47)
HCT VFR BLD AUTO: 27.9 % (ref 35–47)
HGB BLD-MCNC: 6.8 G/DL (ref 11.5–16)
HGB BLD-MCNC: 9 G/DL (ref 11.5–16)
HISTORY CHECKED?,CKHIST: NORMAL
IMM GRANULOCYTES # BLD AUTO: 0 K/UL (ref 0–0.04)
IMM GRANULOCYTES NFR BLD AUTO: 0 % (ref 0–0.5)
IRON SATN MFR SERPL: 34 % (ref 20–50)
IRON SERPL-MCNC: 93 UG/DL (ref 35–150)
LYMPHOCYTES # BLD: 1.8 K/UL (ref 0.8–3.5)
LYMPHOCYTES NFR BLD: 34 % (ref 12–49)
MAGNESIUM SERPL-MCNC: 2 MG/DL (ref 1.6–2.4)
MCH RBC QN AUTO: 33.2 PG (ref 26–34)
MCHC RBC AUTO-ENTMCNC: 31.9 G/DL (ref 30–36.5)
MCV RBC AUTO: 103.9 FL (ref 80–99)
MONOCYTES # BLD: 0.6 K/UL (ref 0–1)
MONOCYTES NFR BLD: 12 % (ref 5–13)
NEUTS SEG # BLD: 2.6 K/UL (ref 1.8–8)
NEUTS SEG NFR BLD: 49 % (ref 32–75)
NRBC # BLD: 0 K/UL (ref 0–0.01)
NRBC BLD-RTO: 0 PER 100 WBC
PHOSPHATE SERPL-MCNC: 3.2 MG/DL (ref 2.6–4.7)
PLATELET # BLD AUTO: 165 K/UL (ref 150–400)
PMV BLD AUTO: 10.2 FL (ref 8.9–12.9)
POTASSIUM SERPL-SCNC: 4.4 MMOL/L (ref 3.5–5.1)
PROT SERPL-MCNC: 4.9 G/DL (ref 6.4–8.2)
RBC # BLD AUTO: 2.05 M/UL (ref 3.8–5.2)
SARS-COV-2, COV2: NORMAL
SERVICE CMNT-IMP: ABNORMAL
SERVICE CMNT-IMP: NORMAL
SODIUM SERPL-SCNC: 143 MMOL/L (ref 136–145)
SOURCE, COVRS: NORMAL
STRESS BASELINE DIAS BP: 51 MMHG
STRESS BASELINE HR: 88 BPM
STRESS BASELINE SYS BP: 134 MMHG
STRESS ESTIMATED WORKLOAD: 1 METS
STRESS EXERCISE DUR MIN: NORMAL
STRESS PEAK DIAS BP: 51 MMHG
STRESS PEAK SYS BP: 134 MMHG
STRESS PERCENT HR ACHIEVED: 72 %
STRESS POST PEAK HR: 100 BPM
STRESS RATE PRESSURE PRODUCT: NORMAL BPM*MMHG
STRESS ST DEPRESSION: 0 MM
STRESS ST ELEVATION: 0 MM
STRESS TARGET HR: 138 BPM
TIBC SERPL-MCNC: 276 UG/DL (ref 250–450)
VIT B12 SERPL-MCNC: 194 PG/ML (ref 193–986)
WBC # BLD AUTO: 5.4 K/UL (ref 3.6–11)

## 2021-02-09 PROCEDURE — 82962 GLUCOSE BLOOD TEST: CPT

## 2021-02-09 PROCEDURE — 0D758ZZ DILATION OF ESOPHAGUS, VIA NATURAL OR ARTIFICIAL OPENING ENDOSCOPIC: ICD-10-PCS | Performed by: INTERNAL MEDICINE

## 2021-02-09 PROCEDURE — 0DJD8ZZ INSPECTION OF LOWER INTESTINAL TRACT, VIA NATURAL OR ARTIFICIAL OPENING ENDOSCOPIC: ICD-10-PCS | Performed by: INTERNAL MEDICINE

## 2021-02-09 PROCEDURE — C1726 CATH, BAL DIL, NON-VASCULAR: HCPCS | Performed by: INTERNAL MEDICINE

## 2021-02-09 PROCEDURE — 74011636637 HC RX REV CODE- 636/637: Performed by: INTERNAL MEDICINE

## 2021-02-09 PROCEDURE — 83036 HEMOGLOBIN GLYCOSYLATED A1C: CPT

## 2021-02-09 PROCEDURE — 74011250637 HC RX REV CODE- 250/637: Performed by: HOSPITALIST

## 2021-02-09 PROCEDURE — 74011250636 HC RX REV CODE- 250/636: Performed by: INTERNAL MEDICINE

## 2021-02-09 PROCEDURE — 30233N1 TRANSFUSION OF NONAUTOLOGOUS RED BLOOD CELLS INTO PERIPHERAL VEIN, PERCUTANEOUS APPROACH: ICD-10-PCS | Performed by: INTERNAL MEDICINE

## 2021-02-09 PROCEDURE — 74011000250 HC RX REV CODE- 250: Performed by: INTERNAL MEDICINE

## 2021-02-09 PROCEDURE — 83735 ASSAY OF MAGNESIUM: CPT

## 2021-02-09 PROCEDURE — 82607 VITAMIN B-12: CPT

## 2021-02-09 PROCEDURE — 74011250636 HC RX REV CODE- 250/636: Performed by: HOSPITALIST

## 2021-02-09 PROCEDURE — 80053 COMPREHEN METABOLIC PANEL: CPT

## 2021-02-09 PROCEDURE — 2709999900 HC NON-CHARGEABLE SUPPLY: Performed by: INTERNAL MEDICINE

## 2021-02-09 PROCEDURE — 74011000250 HC RX REV CODE- 250: Performed by: NURSE ANESTHETIST, CERTIFIED REGISTERED

## 2021-02-09 PROCEDURE — 36415 COLL VENOUS BLD VENIPUNCTURE: CPT

## 2021-02-09 PROCEDURE — 65660000000 HC RM CCU STEPDOWN

## 2021-02-09 PROCEDURE — 85018 HEMOGLOBIN: CPT

## 2021-02-09 PROCEDURE — 76060000031 HC ANESTHESIA FIRST 0.5 HR: Performed by: INTERNAL MEDICINE

## 2021-02-09 PROCEDURE — 87635 SARS-COV-2 COVID-19 AMP PRB: CPT

## 2021-02-09 PROCEDURE — 76040000019: Performed by: INTERNAL MEDICINE

## 2021-02-09 PROCEDURE — 77030018712 HC DEV BLLN INFL BSC -B: Performed by: INTERNAL MEDICINE

## 2021-02-09 PROCEDURE — 83540 ASSAY OF IRON: CPT

## 2021-02-09 PROCEDURE — C9113 INJ PANTOPRAZOLE SODIUM, VIA: HCPCS | Performed by: HOSPITALIST

## 2021-02-09 PROCEDURE — 84100 ASSAY OF PHOSPHORUS: CPT

## 2021-02-09 PROCEDURE — P9016 RBC LEUKOCYTES REDUCED: HCPCS

## 2021-02-09 PROCEDURE — 36430 TRANSFUSION BLD/BLD COMPNT: CPT

## 2021-02-09 PROCEDURE — 85025 COMPLETE CBC W/AUTO DIFF WBC: CPT

## 2021-02-09 PROCEDURE — 74011250637 HC RX REV CODE- 250/637: Performed by: INTERNAL MEDICINE

## 2021-02-09 PROCEDURE — 82728 ASSAY OF FERRITIN: CPT

## 2021-02-09 PROCEDURE — 82746 ASSAY OF FOLIC ACID SERUM: CPT

## 2021-02-09 PROCEDURE — 74011250636 HC RX REV CODE- 250/636: Performed by: NURSE ANESTHETIST, CERTIFIED REGISTERED

## 2021-02-09 PROCEDURE — 99232 SBSQ HOSP IP/OBS MODERATE 35: CPT | Performed by: SPECIALIST

## 2021-02-09 RX ORDER — FENTANYL CITRATE 50 UG/ML
100 INJECTION, SOLUTION INTRAMUSCULAR; INTRAVENOUS
Status: DISCONTINUED | OUTPATIENT
Start: 2021-02-09 | End: 2021-02-09 | Stop reason: HOSPADM

## 2021-02-09 RX ORDER — BISACODYL 5 MG
10 TABLET, DELAYED RELEASE (ENTERIC COATED) ORAL
Status: COMPLETED | OUTPATIENT
Start: 2021-02-09 | End: 2021-02-09

## 2021-02-09 RX ORDER — FLUMAZENIL 0.1 MG/ML
0.2 INJECTION INTRAVENOUS
Status: DISCONTINUED | OUTPATIENT
Start: 2021-02-09 | End: 2021-02-09 | Stop reason: HOSPADM

## 2021-02-09 RX ORDER — MAGNESIUM CITRATE
296 SOLUTION, ORAL ORAL
Status: COMPLETED | OUTPATIENT
Start: 2021-02-09 | End: 2021-02-09

## 2021-02-09 RX ORDER — MIDAZOLAM HYDROCHLORIDE 1 MG/ML
.25-5 INJECTION, SOLUTION INTRAMUSCULAR; INTRAVENOUS
Status: DISCONTINUED | OUTPATIENT
Start: 2021-02-09 | End: 2021-02-09 | Stop reason: HOSPADM

## 2021-02-09 RX ORDER — PROPOFOL 10 MG/ML
INJECTION, EMULSION INTRAVENOUS
Status: DISCONTINUED | OUTPATIENT
Start: 2021-02-09 | End: 2021-02-09 | Stop reason: HOSPADM

## 2021-02-09 RX ORDER — LIDOCAINE HYDROCHLORIDE 20 MG/ML
INJECTION, SOLUTION EPIDURAL; INFILTRATION; INTRACAUDAL; PERINEURAL AS NEEDED
Status: DISCONTINUED | OUTPATIENT
Start: 2021-02-09 | End: 2021-02-09 | Stop reason: HOSPADM

## 2021-02-09 RX ORDER — ATROPINE SULFATE 0.1 MG/ML
0.5 INJECTION INTRAVENOUS
Status: DISCONTINUED | OUTPATIENT
Start: 2021-02-09 | End: 2021-02-09 | Stop reason: HOSPADM

## 2021-02-09 RX ORDER — SODIUM CHLORIDE 9 MG/ML
250 INJECTION, SOLUTION INTRAVENOUS AS NEEDED
Status: DISCONTINUED | OUTPATIENT
Start: 2021-02-09 | End: 2021-02-10 | Stop reason: HOSPADM

## 2021-02-09 RX ORDER — NALOXONE HYDROCHLORIDE 0.4 MG/ML
0.4 INJECTION, SOLUTION INTRAMUSCULAR; INTRAVENOUS; SUBCUTANEOUS
Status: DISCONTINUED | OUTPATIENT
Start: 2021-02-09 | End: 2021-02-09 | Stop reason: HOSPADM

## 2021-02-09 RX ORDER — PANTOPRAZOLE SODIUM 40 MG/1
80 TABLET, DELAYED RELEASE ORAL ONCE
Status: COMPLETED | OUTPATIENT
Start: 2021-02-09 | End: 2021-02-09

## 2021-02-09 RX ORDER — POLYETHYLENE GLYCOL 3350 17 G/17G
17 POWDER, FOR SOLUTION ORAL
Status: COMPLETED | OUTPATIENT
Start: 2021-02-09 | End: 2021-02-09

## 2021-02-09 RX ORDER — PANTOPRAZOLE SODIUM 40 MG/1
40 TABLET, DELAYED RELEASE ORAL
Status: DISCONTINUED | OUTPATIENT
Start: 2021-02-10 | End: 2021-02-10 | Stop reason: HOSPADM

## 2021-02-09 RX ORDER — DEXTROMETHORPHAN/PSEUDOEPHED 2.5-7.5/.8
1.2 DROPS ORAL
Status: DISCONTINUED | OUTPATIENT
Start: 2021-02-09 | End: 2021-02-09 | Stop reason: HOSPADM

## 2021-02-09 RX ORDER — SODIUM CHLORIDE 9 MG/ML
50 INJECTION, SOLUTION INTRAVENOUS CONTINUOUS
Status: DISPENSED | OUTPATIENT
Start: 2021-02-09 | End: 2021-02-09

## 2021-02-09 RX ORDER — PROPOFOL 10 MG/ML
INJECTION, EMULSION INTRAVENOUS AS NEEDED
Status: DISCONTINUED | OUTPATIENT
Start: 2021-02-09 | End: 2021-02-09 | Stop reason: HOSPADM

## 2021-02-09 RX ORDER — EPINEPHRINE 0.1 MG/ML
1 INJECTION INTRACARDIAC; INTRAVENOUS
Status: DISCONTINUED | OUTPATIENT
Start: 2021-02-09 | End: 2021-02-09 | Stop reason: HOSPADM

## 2021-02-09 RX ADMIN — SODIUM CHLORIDE: 9 INJECTION, SOLUTION INTRAVENOUS at 16:34

## 2021-02-09 RX ADMIN — BISACODYL 10 MG: 5 TABLET, COATED ORAL at 08:38

## 2021-02-09 RX ADMIN — PANTOPRAZOLE SODIUM 80 MG: 40 TABLET, DELAYED RELEASE ORAL at 18:46

## 2021-02-09 RX ADMIN — ACETAMINOPHEN 650 MG: 325 TABLET ORAL at 05:28

## 2021-02-09 RX ADMIN — PANTOPRAZOLE SODIUM 40 MG: 40 INJECTION, POWDER, FOR SOLUTION INTRAVENOUS at 08:38

## 2021-02-09 RX ADMIN — Medication 10 ML: at 21:48

## 2021-02-09 RX ADMIN — BISACODYL 10 MG: 5 TABLET, COATED ORAL at 10:43

## 2021-02-09 RX ADMIN — MAGNESIUM CITRATE 296 ML: 1.75 LIQUID ORAL at 08:38

## 2021-02-09 RX ADMIN — POLYETHYLENE GLYCOL 3350 17 G: 17 POWDER, FOR SOLUTION ORAL at 10:10

## 2021-02-09 RX ADMIN — Medication 10 ML: at 11:28

## 2021-02-09 RX ADMIN — BISACODYL 10 MG: 5 TABLET, COATED ORAL at 10:42

## 2021-02-09 RX ADMIN — POLYETHYLENE GLYCOL 3350 17 G: 17 POWDER, FOR SOLUTION ORAL at 10:20

## 2021-02-09 RX ADMIN — CEFTRIAXONE 1 G: 1 INJECTION, POWDER, FOR SOLUTION INTRAMUSCULAR; INTRAVENOUS at 09:54

## 2021-02-09 RX ADMIN — POLYETHYLENE GLYCOL 3350 17 G: 17 POWDER, FOR SOLUTION ORAL at 11:28

## 2021-02-09 RX ADMIN — INSULIN LISPRO 2 UNITS: 100 INJECTION, SOLUTION INTRAVENOUS; SUBCUTANEOUS at 21:48

## 2021-02-09 RX ADMIN — POLYETHYLENE GLYCOL 3350 17 G: 17 POWDER, FOR SOLUTION ORAL at 10:40

## 2021-02-09 RX ADMIN — PROPOFOL 140 MCG/KG/MIN: 10 INJECTION, EMULSION INTRAVENOUS at 16:56

## 2021-02-09 RX ADMIN — LIDOCAINE HYDROCHLORIDE 40 MG: 20 INJECTION, SOLUTION INTRAVENOUS at 16:56

## 2021-02-09 RX ADMIN — POLYETHYLENE GLYCOL 3350 17 G: 17 POWDER, FOR SOLUTION ORAL at 10:12

## 2021-02-09 RX ADMIN — PROPOFOL 70 MG: 10 INJECTION, EMULSION INTRAVENOUS at 16:56

## 2021-02-09 RX ADMIN — SODIUM CHLORIDE 100 ML/HR: 9 INJECTION, SOLUTION INTRAVENOUS at 05:28

## 2021-02-09 RX ADMIN — POLYETHYLENE GLYCOL 3350 17 G: 17 POWDER, FOR SOLUTION ORAL at 11:00

## 2021-02-09 NOTE — PROCEDURES
Maria Esther Correia M.D. 2021    Esophagogastroduodenoscopy (EGD) Colonoscopy Procedure Note  Ely Dumont  : 1940  [facility] Medical Record Number: [id number]      Indications:   acute blood loss anemia  Referring Physician:  Yadira Schumacher MD  Anesthesia/Sedation: see nursing notes  Endoscopist:  Dr. Miguel Michele  Assistants: None  Permit:  The indications, risks, benefits and alternatives were reviewed with the patient or their decision maker who was provided an opportunity to ask questions and all questions were answered. The specific risks of esophagogastroduodenoscopy with conscious sedation were reviewed, including but not limited to anesthetic complication, bleeding, adverse drug reaction, missed lesion, infection, IV site reactions, and intestinal perforation which would lead to the need for surgical repair. Alternatives to EGD including radiographic imaging, observation without testing, or laboratory testing were reviewed as well as the limitations of those alternatives discussed. After considering the options and having all their questions answered, the patient or their decision maker provided both verbal and written consent to proceed. Procedure in Detail:  After obtaining informed consent, positioning of the patient in the left lateral decubitus position, and conduction of a pre-procedure pause or \"time out\" the endoscope was introduced into the mouth and advanced to the duodenum. A careful inspection was made, and findings or interventions are described below.     Findings:   Esophagus:erosion with stricture to 12 mm; no bleeding  Stomach: normal   Duodenum/jejunum: normal    Complications/estimated blood loss: none    Therapies:  esophageal dilation with 15 mm sized balloon    Specimens: none    Implants:none           Endoscopist:  Dr. Miguel Michele  Assistants: None  Complications:  None  Estimate Blood Loss:  None    Colonoscopy is to follow    Permit:  The indications, risks, benefits and alternatives were reviewed with the patient or their decision maker who was provided an opportunity to ask questions and all questions were answered. The specific risks of colonoscopy with conscious sedation were reviewed, including but not limited to anesthetic complication, bleeding, adverse drug reaction, missed lesion, infection, IV site reactions, and intestinal perforation which would lead to the need for surgical repair. Alternatives to colonoscopy including radiographic imaging, observation without testing, or laboratory testing were reviewed including the limitations of those alternatives. After considering the options and having all their questions answered, the patient or their decision maker provided both verbal and written consent to proceed. Procedure in Detail:  After obtaining informed consent, positioning of the patient in the left lateral decubitus position, and conduction of a pre-procedure pause or \"time out\" the endoscope was introduced into the anus and advanced to the cecum, which was identified by the ileocecal valve and appendiceal orifice. The quality of the colonic preparation was inadequate in rectum, sigmoid, descending and distal half transverse. Adequate preparation cecum, ascending, proximal half transverse. A careful inspection was made as the colonoscope was withdrawn, findings and interventions are described below. Appendiceal orifice photographed    Findings:       - Diverticulosis  Old blood and feces in distal half colon; no fresh red blood anywhere    Specimens:    none    Implants: none    Complications:   None; patient tolerated the procedure well. Estimated blood loss: none    Impression:  suspect had diverticular bleed; now stopped.   Incidental finding GERD and stricture    Recommendations:      - daily PPI   If Hb stable discharge tomorrow; unless additional bleeding, no additional exams recommended    Thank you for entrusting me with this patient's care. Please do not hesitate to contact me with any questions or if I can be of assistance with any of your other patients' GI needs.     Signed By: Jak Strong MD                        February 9, 2021

## 2021-02-09 NOTE — PROGRESS NOTES
[facility]un [facility] 79  [address] [facility], [address] [place] [address]  [phone]      Medical Progress Note      NAME: Tatiana Kumar   :  1940  MRM:  [id number]    Date/Time of service: 2021  9:39 AM  This is Dr Fraire Him note, I in error did an addendum to this note, will try and correct Sarika Aldridge MD          Subjective:     Chief Complaint:  Patient was personally seen and examined by me during this time period. Chart reviewed. Still with melena. Low Hgb this AM       Objective:       Vitals:       Last 24hrs VS reviewed since prior progress note.  Most recent are:    Visit Vitals  BP (!) 110/50 (BP 1 Location: Right upper arm, BP Patient Position: At rest)   Pulse 74   Temp 97.9 °F (36.6 °C)   Resp 16   Ht 5' 2\" (1.575 m)   Wt 73.5 kg (162 lb)   SpO2 99%   BMI 29.63 kg/m²     SpO2 Readings from Last 6 Encounters:   21 99%   20 92%   20 98%   20 96%   18 100%   17 100%            Intake/Output Summary (Last 24 hours) at 2021 5983  Last data filed at 2021 6876  Gross per 24 hour   Intake 480 ml   Output 900 ml   Net -420 ml        Exam:     Physical Exam:    Gen:  Elderly, frail, NAD  HEENT:  Pink conjunctivae, PERRL, hearing intact to voice, pale mucous membranes  Neck:  Supple, without masses, thyroid non-tender  Resp:  No accessory muscle use, clear breath sounds without wheezes rales or rhonchi  Card:  No murmurs, normal S1, S2 without thrills, bruits or peripheral edema  Abd:  Soft, non-tender, non-distended, normoactive bowel sounds are present  Musc:  No cyanosis or clubbing  Skin:  No rashes  Neuro:  Cranial nerves 3-12 are grossly intact, follows commands appropriately  Psych:  Good insight, oriented to person, place and time, alert    Medications Reviewed: (see below)    Lab Data Reviewed: (see below)    ______________________________________________________________________    Medications:     Current Facility-Administered Medications   Medication Dose Route Frequency    0.9% sodium chloride infusion 250 mL  250 mL IntraVENous PRN    bisacodyL (DULCOLAX) tablet 10 mg  10 mg Oral Q1H    polyethylene glycol (MIRALAX) packet 17 g  17 g Oral Q20MIN    0.9% sodium chloride infusion 250 mL  250 mL IntraVENous PRN    pantoprazole (PROTONIX) 40 mg in 0.9% sodium chloride 10 mL injection  40 mg IntraVENous Q12H    0.9% sodium chloride infusion  100 mL/hr IntraVENous CONTINUOUS    sodium chloride (NS) flush 5-40 mL  5-40 mL IntraVENous Q8H    sodium chloride (NS) flush 5-40 mL  5-40 mL IntraVENous PRN    acetaminophen (TYLENOL) tablet 650 mg  650 mg Oral Q6H PRN    Or    acetaminophen (TYLENOL) suppository 650 mg  650 mg Rectal Q6H PRN    polyethylene glycol (MIRALAX) packet 17 g  17 g Oral DAILY PRN    bisacodyL (DULCOLAX) suppository 10 mg  10 mg Rectal DAILY PRN    ondansetron (ZOFRAN) injection 4 mg  4 mg IntraVENous Q6H PRN    insulin lispro (HUMALOG) injection   SubCUTAneous AC&HS    glucose chewable tablet 16 g  4 Tab Oral PRN    dextrose (D50W) injection syrg 12.5-25 g  12.5-25 g IntraVENous PRN    glucagon (GLUCAGEN) injection 1 mg  1 mg IntraMUSCular PRN    cefTRIAXone (ROCEPHIN) 1 g in sterile water (preservative free) 10 mL IV syringe  1 g IntraVENous Q24H          Lab Review:     Recent Labs     02/09/21  0340 02/08/21  1340 02/08/21  0124   WBC 5.4  --  9.4   HGB 6.8* 8.1* 9.8*   HCT 21.3* 24.4* 29.9*     --  206     Recent Labs     02/09/21  0340 02/08/21  0238 02/08/21  0124    143 141   K 4.4 5.2* 5.6*   * 113* 109*   CO2 26 24 25   GLU 87 179* 229*   BUN 34* 50* 50*   CREA 1.13* 1.31* 1.57*   CA 7.6* 7.6* 8.1*   MG 2.0  --  1.8   PHOS 3.2  --   --    ALB 2.5*  --  3.0*   TBILI 0.4  --  0.3   ALT 17  --  28   INR  --   --  1.1     Lab Results   Component Value Date/Time    Glucose (POC) 110 (H) 02/09/2021 08:07 AM    Glucose (POC) 120 (H) 02/08/2021 08:02 PM    Glucose (POC) 153 (H) 02/08/2021 05:07 PM    Glucose (POC) 126 (H) 02/08/2021 10:58 AM    Glucose (POC) 129 (H) 04/14/2017 07:40 AM          Assessment / Plan:     77 yo hx of HTN, DM, hypothyroid, presented w/ syncope, melena    1) Syncope: likely due to GI bleed, vasovagal.  Head CT unremarkable. Echo pending. Cards following    2) Acute GI bleed/melena: could be from NSAIDS. Will cont PPI, IVF, NPO. Monitor Hgb closely. GI following    3) Acute blood loss anemia: due to above. Will transfuse 1u RBCS today. Monitor Hgb closely    4) HTN: hold BP meds due to syncope and GI bleed. Consult pharm for med rec    5) DM type 2: last A1C 6.4%. Cont SSI    6) Hypothyroid: cont synthroid    7) UTI: monitor urine Cx.   Cont IV CTX     Total time spent with patient: 35 min **I personally saw and examined the patient during this time period**                 Care Plan discussed with: Patient, nursing     Discussed:  Care Plan    Prophylaxis:  SCD's    Disposition:  Home w/Family           ___________________________________________________    Attending Physician: Ayaan Haque MD

## 2021-02-09 NOTE — CONSULTS
Gastroenterology Consult     Referring Physician: Rachel Pemberton    Consult Date: 2/9/2021     Subjective:bleeding     Chief Complaint: bleeding    History of Present Illness: Topher Betts is a 78 y.o. female who is seen in consultation for acute blood loss anemia. Presents with acute bloody bowel movement with syncope and decline hb 12 to 6.8. No vomiting. Has received blood transfusions    Past Medical History:   Diagnosis Date    Anxiety     Cancer [facility])     Skin of face.  Cataracts, bilateral     Chronic back pain     x1 yr;seeing Chiropractor for a year    Diabetes ([address]     GERD (gastroesophageal reflux disease)     Gout     Hypercholesterolemia     Hypertension     Hypothyroidism     Migraine     Shingles      Past Surgical History:   Procedure Laterality Date    HX CATARACT REMOVAL Bilateral 05/2017    HX CHOLECYSTECTOMY      Open cholecystectomy.  HX COLONOSCOPY  06/07/2012    HX COLONOSCOPY  05/30/2010    HX DILATION AND CURETTAGE      HX DILATION AND CURETTAGE      HX ENDOSCOPY  06/07/2012    Ulcers in Antrum     HX HERNIA REPAIR      Umbilical herniorrhaphy with mesh.     HX TONSILLECTOMY      HX TONSILLECTOMY        Family History   Problem Relation Age of Onset    Diabetes Mother     Alzheimer Mother     Other Mother         Poliosis    Heart Attack Father      Social History     Tobacco Use    Smoking status: Never Smoker    Smokeless tobacco: Never Used   Substance Use Topics    Alcohol use: No      Allergies   Allergen Reactions    Shellfish Derived Other (comments)     \"causes my gout to act up, not an allergy\"     Current Facility-Administered Medications   Medication Dose Route Frequency    0.9% sodium chloride infusion 250 mL  250 mL IntraVENous PRN    0.9% sodium chloride infusion  50 mL/hr IntraVENous CONTINUOUS    midazolam (VERSED) injection 0.25-5 mg  0.25-5 mg IntraVENous Multiple    fentaNYL citrate (PF) injection 100 mcg  100 mcg IntraVENous Multiple   • naloxone (NARCAN) injection 0.4 mg  0.4 mg IntraVENous Multiple   • flumazeniL (ROMAZICON) 0.1 mg/mL injection 0.2 mg  0.2 mg IntraVENous Multiple   • simethicone (MYLICON) 40mg/0.6mL oral drops 80 mg  1.2 mL Oral Multiple   • atropine injection 0.5 mg  0.5 mg IntraVENous ONCE PRN   • EPINEPHrine (ADRENALIN) 0.1 mg/mL syringe 1 mg  1 mg Endoscopically ONCE PRN   • 0.9% sodium chloride infusion 250 mL  250 mL IntraVENous PRN   • pantoprazole (PROTONIX) 40 mg in 0.9% sodium chloride 10 mL injection  40 mg IntraVENous Q12H   • 0.9% sodium chloride infusion  100 mL/hr IntraVENous CONTINUOUS   • sodium chloride (NS) flush 5-40 mL  5-40 mL IntraVENous Q8H   • sodium chloride (NS) flush 5-40 mL  5-40 mL IntraVENous PRN   • acetaminophen (TYLENOL) tablet 650 mg  650 mg Oral Q6H PRN    Or   • acetaminophen (TYLENOL) suppository 650 mg  650 mg Rectal Q6H PRN   • polyethylene glycol (MIRALAX) packet 17 g  17 g Oral DAILY PRN   • bisacodyL (DULCOLAX) suppository 10 mg  10 mg Rectal DAILY PRN   • ondansetron (ZOFRAN) injection 4 mg  4 mg IntraVENous Q6H PRN   • insulin lispro (HUMALOG) injection   SubCUTAneous AC&HS   • glucose chewable tablet 16 g  4 Tab Oral PRN   • dextrose (D50W) injection syrg 12.5-25 g  12.5-25 g IntraVENous PRN   • glucagon (GLUCAGEN) injection 1 mg  1 mg IntraMUSCular PRN   • cefTRIAXone (ROCEPHIN) 1 g in sterile water (preservative free) 10 mL IV syringe  1 g IntraVENous Q24H        Review of Systems:  A detailed 10 organ review of systems is obtained with pertinent positives as listed in the History of Present Illness and Past Medical History. All others are negative.    Objective:     Physical Exam:  Visit Vitals  BP (!) 143/76   Pulse (!) 117   Temp 97.8 °F (36.6 °C)   Resp 15   Ht 5' 2\" (1.575 m)   Wt 73.5 kg (162 lb)   SpO2 98%   BMI 29.63 kg/m²        Skin:  Extremities and face reveal no rashes. No marin erythema. No telangiectasias on the chest wall.  HEENT: Sclerae anicteric.  Extra-occular muscles are intact. No oral ulcers. No abnormal pigmentation of the lips. The neck is supple. Cardiovascular: Regular rate and rhythm. No murmurs, gallops, or rubs. PMI nondisplaced. Carotids without bruits. Respiratory:  Comfortable breathing with no accessory muscle use. Clear breath sounds with no wheezes, rales, or rhonchi. GI:  Abdomen nondistended, soft, and nontender. Normal active bowel sounds. No enlargement of the liver or spleen. No masses palpable. Musculoskeletal:  No pitting edema of the lower legs. Extremities have good range of motion. No costovertebral tenderness. Neurological:  Gross memory appears intact. Patient is alert and oriented. Psychiatric:  Mood appears appropriate with judgement intact. Lymphatic:  No cervical or supraclavicular adenopathy.     Lab/Data Review:  CMP:   Lab Results   Component Value Date/Time     02/09/2021 03:40 AM    K 4.4 02/09/2021 03:40 AM     (H) 02/09/2021 03:40 AM    CO2 26 02/09/2021 03:40 AM    AGAP 3 (L) 02/09/2021 03:40 AM    GLU 87 02/09/2021 03:40 AM    BUN 34 (H) 02/09/2021 03:40 AM    CREA 1.13 (H) 02/09/2021 03:40 AM    GFRAA 56 (L) 02/09/2021 03:40 AM    GFRNA 46 (L) 02/09/2021 03:40 AM    CA 7.6 (L) 02/09/2021 03:40 AM    MG 2.0 02/09/2021 03:40 AM    PHOS 3.2 02/09/2021 03:40 AM    ALB 2.5 (L) 02/09/2021 03:40 AM    TP 4.9 (L) 02/09/2021 03:40 AM    GLOB 2.4 02/09/2021 03:40 AM    AGRAT 1.0 (L) 02/09/2021 03:40 AM    ALT 17 02/09/2021 03:40 AM     CBC:   Lab Results   Component Value Date/Time    WBC 5.4 02/09/2021 03:40 AM    HGB 6.8 (L) 02/09/2021 03:40 AM    HCT 21.3 (L) 02/09/2021 03:40 AM     02/09/2021 03:40 AM         Assessment/Plan:     Active Problems:    DM type 2 (diabetes mellitus, type 2) ([facility] (3/10/2016)      Hypothyroidism (3/10/2016)      HTN (hypertension), benign (3/10/2016)      GERD (gastroesophageal reflux disease) (4/4/2017)      Acute gastrointestinal hemorrhage (2/8/2021)      Melena (2/8/2021)      Syncope (2/8/2021)      Acute blood loss anemia (2/8/2021)         Recommend:    I've discussed EGD, colonoscopy possible small bowel capsule, biopsy, polypectomy, cautery, injection, alternatives, complications including but not limited to pain, cardiopulmonary event, bleeding, perforation requiring additional blood transfusion or operative repair; all questions answered.

## 2021-02-09 NOTE — PERIOP NOTES
Angelica Barron  1940  [id number]    Situation:  Verbal report received from: Ronit Etienne RN  Procedure: Procedure(s):  ESOPHAGOGASTRODUODENOSCOPY (EGD)  COLONOSCOPY  ESOPHAGEAL DILATION    Background:    Preoperative diagnosis: GI Bleed  Postoperative diagnosis: 1.- Esophageal Stricture  2.- Diverticulosis    :  Dr. Zeferino Guillermo  Assistant(s): Endoscopy Technician-1: Marleny Monteiro  Endoscopy RN-1: Jenny Mejia RN    Specimens: * No specimens in log *  H. Pylori  no    Assessment:    Anesthesia gave intra-procedure sedation and medications, see anesthesia flow sheet yes    Intravenous fluids: NS@ KVO     Vital signs stable yes    Abdominal assessment: round and soft yes    Recommendation:  Discharge patient per MD order back to floor.   Return to floor yes  Family or Friend n/a  Permission to share finding with family or friend n/a

## 2021-02-09 NOTE — PERIOP NOTES
Hilda Sebastianmaral  1940  [id number]    Situation:    Scheduled Procedure: Procedure(s):  ESOPHAGOGASTRODUODENOSCOPY (EGD)  COLONOSCOPY  Verbal report received from: ENOCH Sullivan  Preoperative diagnosis: GI Bleed    Background:    Procedure: Procedure(s):  ESOPHAGOGASTRODUODENOSCOPY (EGD)  COLONOSCOPY  Physician performing procedure; Dr. Suze Venegas MD    SBAR QUESTIONS FLOOR TO ENDO RN    NPO Status/Last PO Intake: since 12pm (finished up prep)    Pregnancy Test:Not applicable If yes, result: none    Is the patient taking Blood Thinners: NO If   Is the patient diabetic:yes       If yes, what was the last BS:  151  Time taken? 0  Anything given? no           Does the patient have a Pacemaker/Defibrillator in place?: no   Does the patient need antibiotics before/during/after procedure: no   If the patient is having a colon, How much prep was drank? 100%, vomited some   What were the Colon prep results? Dark and watery   Does the patient have SCD in place:no   Is patient on CONTACT precautions:no        Assessment:  Are the vital signs stable prior to patient coming to ENDO?  yes  Is the patient alert/oriented and able to sign consent for the procedures:yes  How does the patient's abdomen feel prior to coming to ENDO? round and soft yes   Does the patient have a patient IV in place?  Yes 20 G R Wrist, 20 L FA     Recommendation:  Family or Friend present no     Permission to share finding with Family or Friend n/a

## 2021-02-09 NOTE — PROGRESS NOTES
Transition Plan of Care  RUR 17%    Plan:  1. Monitor patient's response to treatment. 2. GI consulted. 3. Patient having endoscopic exam today. 4. Family to transport home. 5. Case Management to follow.     OBED Castañeda

## 2021-02-09 NOTE — ANESTHESIA PREPROCEDURE EVALUATION
Anesthetic History   No history of anesthetic complications            Review of Systems / Medical History  Patient summary reviewed, nursing notes reviewed and pertinent labs reviewed    Pulmonary  Within defined limits                 Neuro/Psych   Within defined limits           Cardiovascular    Hypertension                   GI/Hepatic/Renal     GERD           Endo/Other    Diabetes  Hypothyroidism  Obesity and anemia     Other Findings   Comments: Hg=9, admitted after syncopal episode associated with melanotic stool           Physical Exam    Airway  Mallampati: II  TM Distance: 4 - 6 cm  Neck ROM: normal range of motion   Mouth opening: Normal     Cardiovascular    Rhythm: regular  Rate: normal         Dental  No notable dental hx       Pulmonary  Breath sounds clear to auscultation               Abdominal         Other Findings            Anesthetic Plan    ASA: 3  Anesthesia type: MAC            Anesthetic plan and risks discussed with: Patient

## 2021-02-09 NOTE — PROGRESS NOTES
TRANSFER - OUT REPORT:    Verbal report given to ENOCH West(name) on Jason Weinberg  being transferred to S3(unit) for routine post - op       Report consisted of patients Situation, Background, Assessment and   Recommendations(SBAR). Information from the following report(s) SBAR was reviewed with the receiving nurse. Lines:   Peripheral IV 02/09/21 Anterior;Right Forearm (Active)   Site Assessment Clean, dry, & intact 02/09/21 1507   Phlebitis Assessment 0 02/09/21 1507   Dressing Status Clean, dry, & intact 02/09/21 1507   Dressing Type Transparent;Tape 02/09/21 1507   Hub Color/Line Status Pink; Infusing 02/09/21 1507   Action Taken Open ports on tubing capped 02/09/21 1507   Alcohol Cap Used Yes 02/09/21 1507        Opportunity for questions and clarification was provided.       Patient transported with:VS

## 2021-02-09 NOTE — ROUTINE PROCESS
Bedside shift change report given to Laurie (oncoming nurse) by Siva Burden (offgoing nurse). Report included the following information SBAR, Kardex, Intake/Output, MAR, Accordion and Recent Results.

## 2021-02-09 NOTE — PROGRESS NOTES
5100- pt hgb 6.8- sent perfect serve to Dr. Karmen Keita about needing blood consent form. Dr. Karmen Keita informed RN that she is a tele MD and to reach out to Dr. Shonna Blanchard. [phone]- contacted Dr. Shonna Blanchard about obtaining patient consent for blood transfusion. Patient is stable to this time and will defer to Dr. Jory Drake oncoming this morning as patient has questions. Type and cross match have been completed.

## 2021-02-09 NOTE — PROGRESS NOTES

## 2021-02-09 NOTE — PROGRESS NOTES
Bedside and Verbal shift change report given to [address] [facility] (oncoming nurse) by Margo Howell (offgoing nurse). Report included the following information SBAR, Kardex, ED Summary, Procedure Summary, Intake/Output, MAR, Recent Results, Med Rec Status, Cardiac Rhythm NSR, Alarm Parameters  and Quality Measures.

## 2021-02-09 NOTE — PROGRESS NOTES
Bedside and Verbal shift change report given to Sultana,RN (oncoming nurse) by Natalia Valadez (offgoing nurse). Report included the following information SBAR.

## 2021-02-09 NOTE — PROGRESS NOTES
Cardiology Progress Note    MIU overflow      NAME:  Dinorah Rousseau   :   1940   MRN:   [id number]   Assessment/Plan/Discussion:Cardiology Attending:         Patient seen on the day of progress note and examined  and agree with Advance Practice Provider (LORE, NP,PA)  assessment and plans. Dinorah Rousseau is a 78 y.o. female      Fairly comfortable getting the prep for her procedure so she is try to drink the fluids. Went over echo and nuclear which showed no evidence of cardiac disease. I suspect her vasovagal episode was related to noncardiac reasons. She will follow up as needed. Hemoglobin low          Lab Results   Component Value Date/Time     HGB 6.8 (L) 2021 03:40 AM   Agree with transfusion we will see you back as needed suggest holding BP meds until more stable        Mariam hRodes MD   This note was created using        Assessment/Plan:   1. Syncope: likely vasovagal: no evidence of arrythmia on tele. Holding home atenolol. BP soft  2. Melena/anemia: plan for GI eval today if prep can be completed. Would transfuse as hgb < 7.   3. CHANEL: ECHO with nml EF, stress test in process. 4. T 2 DM:   5. UTI: on rocephin. Subjective:   Dinorah Rousseau is a 78 y.o. female  admitted for syncope: Patient was watching the Super Bowl and then went to the bathroom. Later she stood up and felt lightheaded. Within about 15 to 30 minutes when she was cleaning up in the kitchen she started to feel lightheaded again and then passed out. She woke up on the floor and called for her son who lives downstairs. She was wet and was found to have had black tarry stools  She came to the emergency room about 1 AM and was admitted for syncope.      She lives with her son who has had a stroke she is a non-smoker and has no family history of heart disease.     She has never had previous syncope.   She reports chest pain like a heartburn feeling most nights radiates around her chest she takes an occasional Pepcid AC she is a dyspnea on exertion when she goes up her laundry about 20 steps. She thinks overall from the time she had a bowel movement until she passed out was about 15 or 20 minutes perhaps 30 minutes. Cardiac ROS: Patient denies any exertional chest pain, dyspnea, palpitations, syncope, orthopnea, edema or paroxysmal nocturnal dyspnea. Previous Cardiac Eval  21   ECHO ADULT COMPLETE 2021    Narrative · LV: Estimated LVEF is 55 - 60%. Normal cavity size, wall thickness and   systolic function (ejection fraction normal). Wall motion: normal.  · TV: Mild tricuspid valve regurgitation is present. · PA: Moderate pulmonary hypertension. Pulmonary arterial systolic   pressure is 50 mmHg. Signed by: Iraj Freitas MD            Review of Systems: + headache. No further bleeding. NPO. Objective:     Visit Vitals  BP (!) 110/50 (BP 1 Location: Right upper arm, BP Patient Position: At rest)   Pulse 74   Temp 97.9 °F (36.6 °C)   Resp 16   Ht 5' 2\" (1.575 m)   Wt 73.5 kg (162 lb)   SpO2 99%   BMI 29.63 kg/m²      O2 Device: Room air    Temp (24hrs), Av.8 °F (36.6 °C), Min:97.5 °F (36.4 °C), Max:98.1 °F (36.7 °C)      701 - 1900  In: -   Out: 400 [Urine:400]    1901 -  07  In: 1480 [P.O.:480; I.V.:1000]  Out: 500 [Urine:500]  TELE: SR    General: AAOx3 cooperative, no acute distress. [place]   HEENT: Atraumatic. Pink and moist.  Anicteric sclerae. Neck : Supple  Lungs: CTA bilaterally. No wheezing/rhonchi/rales. Heart: Regular rhythm, no murmur. No JVD. No carotid bruits. Abdomen: Soft, non-distended, non-tender. + Bowel sounds. Extremities: No edema, no clubbing, no cyanosis. Neurologic: Grossly intact. Alert and oriented X 3. No acute neurological distress. Psych: Good insight. Not anxious or agitated.         Care Plan discussed with:    Comments   Patient x    Family      RN x    Care Manager Consultant:  x        Data Review:     No lab exists for component: ITNL   Recent Labs     02/08/21  0124   TROIQ <0.05     Recent Labs     02/09/21  0340 02/08/21  1340 02/08/21  0238 02/08/21  0124     --  143 141   K 4.4  --  5.2* 5.6*   *  --  113* 109*   CO2 26  --  24 25   BUN 34*  --  50* 50*   CREA 1.13*  --  1.31* 1.57*   GLU 87  --  179* 229*   PHOS 3.2  --   --   --    MG 2.0  --   --  1.8   ALB 2.5*  --   --  3.0*   WBC 5.4  --   --  9.4   HGB 6.8* 8.1*  --  9.8*   HCT 21.3* 24.4*  --  29.9*     --   --  206     Recent Labs     02/08/21  0124   INR 1.1   PTP 10.9   APTT <20.0*       Medications reviewed  Current Facility-Administered Medications   Medication Dose Route Frequency    0.9% sodium chloride infusion 250 mL  250 mL IntraVENous PRN    bisacodyL (DULCOLAX) tablet 10 mg  10 mg Oral Q1H    polyethylene glycol (MIRALAX) packet 17 g  17 g Oral Q20MIN    0.9% sodium chloride infusion 250 mL  250 mL IntraVENous PRN    pantoprazole (PROTONIX) 40 mg in 0.9% sodium chloride 10 mL injection  40 mg IntraVENous Q12H    0.9% sodium chloride infusion  100 mL/hr IntraVENous CONTINUOUS    sodium chloride (NS) flush 5-40 mL  5-40 mL IntraVENous Q8H    sodium chloride (NS) flush 5-40 mL  5-40 mL IntraVENous PRN    acetaminophen (TYLENOL) tablet 650 mg  650 mg Oral Q6H PRN    Or    acetaminophen (TYLENOL) suppository 650 mg  650 mg Rectal Q6H PRN    polyethylene glycol (MIRALAX) packet 17 g  17 g Oral DAILY PRN    bisacodyL (DULCOLAX) suppository 10 mg  10 mg Rectal DAILY PRN    ondansetron (ZOFRAN) injection 4 mg  4 mg IntraVENous Q6H PRN    insulin lispro (HUMALOG) injection   SubCUTAneous AC&HS    glucose chewable tablet 16 g  4 Tab Oral PRN    dextrose (D50W) injection syrg 12.5-25 g  12.5-25 g IntraVENous PRN    glucagon (GLUCAGEN) injection 1 mg  1 mg IntraMUSCular PRN    cefTRIAXone (ROCEPHIN) 1 g in sterile water (preservative free) 10 mL IV syringe  1 g IntraVENous Q24H         Adam Roach, NP

## 2021-02-09 NOTE — PROGRESS NOTES
CRE balloon dilatation of the esophagus   15 mm Balloon inflated to 5 ATMs and held for 3 seconds. 16.5 mm Balloon inflated to 4.5 ATMs and held for 0 seconds. 18 mm Balloon inflated to 7 ATMs and held for 0 seconds. No subcutaneous crepitus of the chest or cervical region was noted post dilatation.

## 2021-02-09 NOTE — PERIOP NOTES
Per patient request, contacted her son, to let him know that the patient's procedure went well with no complications.

## 2021-02-10 VITALS
RESPIRATION RATE: 18 BRPM | OXYGEN SATURATION: 99 % | BODY MASS INDEX: 29.81 KG/M2 | DIASTOLIC BLOOD PRESSURE: 58 MMHG | WEIGHT: 162 LBS | TEMPERATURE: 97.8 F | SYSTOLIC BLOOD PRESSURE: 121 MMHG | HEIGHT: 62 IN | HEART RATE: 79 BPM

## 2021-02-10 PROBLEM — K57.90 DIVERTICULOSIS: Status: ACTIVE | Noted: 2021-02-10

## 2021-02-10 LAB
ANION GAP SERPL CALC-SCNC: 8 MMOL/L (ref 5–15)
BUN SERPL-MCNC: 20 MG/DL (ref 6–20)
BUN/CREAT SERPL: 19 (ref 12–20)
CALCIUM SERPL-MCNC: 8.4 MG/DL (ref 8.5–10.1)
CHLORIDE SERPL-SCNC: 111 MMOL/L (ref 97–108)
CO2 SERPL-SCNC: 23 MMOL/L (ref 21–32)
CREAT SERPL-MCNC: 1.05 MG/DL (ref 0.55–1.02)
ERYTHROCYTE [DISTWIDTH] IN BLOOD BY AUTOMATED COUNT: 15.9 % (ref 11.5–14.5)
GLUCOSE BLD STRIP.AUTO-MCNC: 114 MG/DL (ref 65–100)
GLUCOSE BLD STRIP.AUTO-MCNC: 228 MG/DL (ref 65–100)
GLUCOSE SERPL-MCNC: 93 MG/DL (ref 65–100)
HCT VFR BLD AUTO: 27 % (ref 35–47)
HGB BLD-MCNC: 8.9 G/DL (ref 11.5–16)
LEFT CCA DIST DIAS: 25.3 CM/S
LEFT CCA DIST SYS: 106.6 CM/S
LEFT CCA PROX DIAS: 35.9 CM/S
LEFT CCA PROX SYS: 136.1 CM/S
LEFT ECA DIAS: 12.91 CM/S
LEFT ECA SYS: 83.4 CM/S
LEFT ICA DIST DIAS: 41.6 CM/S
LEFT ICA DIST SYS: 146.1 CM/S
LEFT ICA MID DIAS: 41.6 CM/S
LEFT ICA MID SYS: 141.5 CM/S
LEFT ICA PROX DIAS: 25.7 CM/S
LEFT ICA PROX SYS: 87.1 CM/S
LEFT ICA/CCA SYS: 1.37
LEFT SUBCLAVIAN DIAS: 5.29 CM/S
LEFT SUBCLAVIAN SYS: 189.2 CM/S
LEFT VERTEBRAL DIAS: 14.38 CM/S
LEFT VERTEBRAL SYS: 58 CM/S
MAGNESIUM SERPL-MCNC: 2.8 MG/DL (ref 1.6–2.4)
MCH RBC QN AUTO: 33.1 PG (ref 26–34)
MCHC RBC AUTO-ENTMCNC: 33 G/DL (ref 30–36.5)
MCV RBC AUTO: 100.4 FL (ref 80–99)
NRBC # BLD: 0 K/UL (ref 0–0.01)
NRBC BLD-RTO: 0 PER 100 WBC
PHOSPHATE SERPL-MCNC: 3.1 MG/DL (ref 2.6–4.7)
PLATELET # BLD AUTO: 179 K/UL (ref 150–400)
PMV BLD AUTO: 10.3 FL (ref 8.9–12.9)
POTASSIUM SERPL-SCNC: 3.9 MMOL/L (ref 3.5–5.1)
RBC # BLD AUTO: 2.69 M/UL (ref 3.8–5.2)
RIGHT CCA DIST DIAS: 30 CM/S
RIGHT CCA DIST SYS: 102 CM/S
RIGHT CCA PROX DIAS: 32.3 CM/S
RIGHT CCA PROX SYS: 127.5 CM/S
RIGHT ECA DIAS: 13.7 CM/S
RIGHT ECA SYS: 118.2 CM/S
RIGHT ICA DIST DIAS: 41.2 CM/S
RIGHT ICA DIST SYS: 135.8 CM/S
RIGHT ICA MID DIAS: 43.2 CM/S
RIGHT ICA MID SYS: 126 CM/S
RIGHT ICA PROX DIAS: 21.5 CM/S
RIGHT ICA PROX SYS: 133.9 CM/S
RIGHT ICA/CCA SYS: 1.3
RIGHT SUBCLAVIAN DIAS: 0 CM/S
RIGHT SUBCLAVIAN SYS: 145.7 CM/S
RIGHT VERTEBRAL DIAS: 11.52 CM/S
RIGHT VERTEBRAL SYS: 62 CM/S
SERVICE CMNT-IMP: ABNORMAL
SERVICE CMNT-IMP: ABNORMAL
SODIUM SERPL-SCNC: 142 MMOL/L (ref 136–145)
WBC # BLD AUTO: 5.9 K/UL (ref 3.6–11)

## 2021-02-10 PROCEDURE — 85027 COMPLETE CBC AUTOMATED: CPT

## 2021-02-10 PROCEDURE — 74011250636 HC RX REV CODE- 250/636: Performed by: INTERNAL MEDICINE

## 2021-02-10 PROCEDURE — 36415 COLL VENOUS BLD VENIPUNCTURE: CPT

## 2021-02-10 PROCEDURE — 84100 ASSAY OF PHOSPHORUS: CPT

## 2021-02-10 PROCEDURE — 83735 ASSAY OF MAGNESIUM: CPT

## 2021-02-10 PROCEDURE — 74011636637 HC RX REV CODE- 636/637: Performed by: INTERNAL MEDICINE

## 2021-02-10 PROCEDURE — 74011250637 HC RX REV CODE- 250/637: Performed by: INTERNAL MEDICINE

## 2021-02-10 PROCEDURE — 82962 GLUCOSE BLOOD TEST: CPT

## 2021-02-10 PROCEDURE — 74011000250 HC RX REV CODE- 250: Performed by: INTERNAL MEDICINE

## 2021-02-10 PROCEDURE — 80048 BASIC METABOLIC PNL TOTAL CA: CPT

## 2021-02-10 PROCEDURE — 74011250637 HC RX REV CODE- 250/637: Performed by: HOSPITALIST

## 2021-02-10 RX ORDER — POLYETHYLENE GLYCOL 3350 17 G/17G
17 POWDER, FOR SOLUTION ORAL DAILY
Qty: 30 PACKET | Refills: 0 | Status: SHIPPED | OUTPATIENT
Start: 2021-02-10 | End: 2022-03-24 | Stop reason: ALTCHOICE

## 2021-02-10 RX ORDER — PANTOPRAZOLE SODIUM 40 MG/1
40 TABLET, DELAYED RELEASE ORAL
Qty: 30 TAB | Refills: 1 | Status: SHIPPED | OUTPATIENT
Start: 2021-02-11 | End: 2022-03-24 | Stop reason: ALTCHOICE

## 2021-02-10 RX ORDER — ASPIRIN 81 MG/1
81 TABLET ORAL
Qty: 30 TAB | Refills: 0 | Status: SHIPPED
Start: 2021-02-15

## 2021-02-10 RX ORDER — AMOXICILLIN 250 MG
1 CAPSULE ORAL DAILY
Qty: 30 TAB | Refills: 0 | Status: SHIPPED | OUTPATIENT
Start: 2021-02-10 | End: 2022-05-02

## 2021-02-10 RX ADMIN — Medication 10 ML: at 05:30

## 2021-02-10 RX ADMIN — PANTOPRAZOLE SODIUM 40 MG: 40 TABLET, DELAYED RELEASE ORAL at 08:35

## 2021-02-10 RX ADMIN — INSULIN LISPRO 3 UNITS: 100 INJECTION, SOLUTION INTRAVENOUS; SUBCUTANEOUS at 12:29

## 2021-02-10 RX ADMIN — CEFTRIAXONE 1 G: 1 INJECTION, POWDER, FOR SOLUTION INTRAMUSCULAR; INTRAVENOUS at 09:31

## 2021-02-10 RX ADMIN — ACETAMINOPHEN 650 MG: 325 TABLET ORAL at 05:28

## 2021-02-10 NOTE — PROGRESS NOTES
SHIFT CHANGE:  1930 Bedside and Verbal shift change report given to Tara KENDALL (oncoming nurse) by Nakul Bronson (offgoing nurse). Report included the following information SBAR, Kardex, MAR and Recent Results. SHIFT SUMMARY:  1945  Patient had a small loose black stool this evening. Will report to the attending in the am, and is due to have repeat hgb in the am.  Informed on call MD - Dr. Amilcar Smyth about stool and no repeat CBC after blood transfusion. Will await orders. 2200  Hgb = 9. Will have a CBC in the am as well. END OF SHIFT REPORT:  0730  Bedside and Verbal shift change report given to Jaquan Whitaker (oncoming nurse) by Thomas Barry (offgoing nurse). Report included the following information SBAR, Kardex, MAR and Recent Results.

## 2021-02-10 NOTE — PROGRESS NOTES
Bedside shift change report given to Paulo Galdamez RN (oncoming nurse) by Jeannine Del Toro RN (offgoing nurse). Report included the following information SBAR, Kardex and MAR.

## 2021-02-10 NOTE — PROGRESS NOTES
Gastrointestinal Progress Note    2/10/2021      Subjective:     New Complaints Today:  None    Pain: Patient complains of abdominal pain no. Bowel Movements: Normal    Bleeding:  None    Tolerating cardiac diet    Objective:     BP (!) 148/68 (BP 1 Location: Right upper arm, BP Patient Position: At rest)   Pulse 80   Temp 98.4 °F (36.9 °C)   Resp 16   Ht 5' 2\" (1.575 m)   Wt 162 lb (73.5 kg)   SpO2 97%   BMI 29.63 kg/m²       EXAM:  GENERAL: no distress, HEART: regular rate and rhythm, S1, S2 normal, no murmur, click, rub or gallop, LUNGS: Heart exam - S1, S2 normal, no murmur, no gallop, rate regular, ABDOMEN:  Bowel sounds are normal, liver is not enlarged, spleen is not enlarged and EXTREMITY: extremities normal, atraumatic, no cyanosis or edema        Assessment and plan      Active Problems:    DM type 2 (diabetes mellitus, type 2) (HCC) (3/10/2016)       Hypothyroidism (3/10/2016)       HTN (hypertension), benign (3/10/2016)       GERD (gastroesophageal reflux disease) (4/4/2017)       Acute gastrointestinal hemorrhage (2/8/2021)       Melena (2/8/2021)       Syncope (2/8/2021)       Acute blood loss anemia (2/8/2021)         S/p EGD and colonoscopy on 2/9, found to have  - Diverticulosis  Old blood and feces in distal half colon; no fresh red blood anywhere    Recommend: patient discharged. Continue PPI. Can try Gas-X for bloating. Follow up outpatient.      Patient discussed with Dr Cecille Dixon (GI attending)    Berto Kendrick MD

## 2021-02-10 NOTE — PROGRESS NOTES
Transition Plan of Care  RUR 16%    Plan  Patient is ready for discharge from case management standpoint with no needs. Son will transport at discharge. 2nd Im letter received and signed, copy on chart. Care Management Interventions  PCP Verified by CM: Yes(Dr Sundar Huber)  Mode of Transport at Discharge:  Other (see comment)(son)  Transition of Care Consult (CM Consult): Discharge Planning  MyChart Signup: No  Discharge Durable Medical Equipment: No  Health Maintenance Reviewed: Yes  Physical Therapy Consult: No  Occupational Therapy Consult: No  Speech Therapy Consult: No  Current Support Network: Relative's Home  Confirm Follow Up Transport: Self   Resource Information Provided?: No  Discharge Location  Discharge Placement: Home with family assistance        [facility] [place]

## 2021-02-10 NOTE — PROGRESS NOTES
Bedside and Verbal shift change report given to ENOCH Pacheco (oncoming nurse) by Corrina Pearson RN (offgoing nurse). Report included the following information SBAR, Intake/Output, MAR and Recent Results.

## 2021-02-10 NOTE — DISCHARGE SUMMARY
[facility] 79  [address]  [phone]    Physician Discharge Summary     Patient ID:  Jason Weinberg  [id number]  17 y.o.  1940    Admit date: 2/8/2021    Discharge date and time: 2/10/2021 9:12 AM    Admission Diagnoses: Acute gastrointestinal hemorrhage [K92.2]  Melena [K92.1]  Syncope [R55]    Discharge Diagnoses:  Principal Diagnosis Acute gastrointestinal hemorrhage                                            Principal Problem:    Acute gastrointestinal hemorrhage (2/8/2021)    Active Problems:    DM type 2 (diabetes mellitus, type 2) ([facility] [address] (3/10/2016)      Hypothyroidism (3/10/2016)      HTN (hypertension), benign (3/10/2016)      GERD (gastroesophageal reflux disease) (4/4/2017)      Melena (2/8/2021)      Syncope (2/8/2021)      Acute blood loss anemia (2/8/2021)      Diverticulosis (2/10/2021)           Hospital Course:     79 yo hx of HTN, DM, hypothyroid, presented w/ syncope, melena     1) Syncope: likely due to GI bleed, vasovagal.  Head CT unremarkable. Echo with EF 55-60%. Carotid dopplers neg. No further w/u per Cards     2) Acute GI bleed/melena: could be from NSAIDS. GI performed an EGD w/ showed esophageal stricture s/p dilation. Colonoscopy with diverticulosis. Cont PPI     3) Acute blood loss anemia: due to above. S/p 1u RBCs. Hgb stable on discharge     4) HTN: cont atenolol      5) DM type 2: A1C 6.6%.   Cont januvia, glipizide      6) Hypothyroid: cont synthroid     7) UTI: s/p IV CTX    PCP: Kim Maloney MD     Consults: GI    Significant Diagnostic Studies: EGD/colonoscopy     Discharge Exam:  Physical Exam:    Gen: Well-developed, well-nourished, in no acute distress  HEENT:  Pink conjunctivae, PERRL, hearing intact to voice, moist mucous membranes  Neck: Supple, without masses, thyroid non-tender  Resp: No accessory muscle use, clear breath sounds without wheezes rales or rhonchi  Card: No murmurs, normal S1, S2 without thrills, bruits or peripheral edema  Abd:  Soft, non-tender, non-distended, normoactive bowel sounds are present  Lymph:  No cervical or inguinal adenopathy  Musc: No cyanosis or clubbing  Skin: No rashes  Neuro:  Cranial nerves are grossly intact, no focal motor weakness, follows commands appropriately  Psych:  Good insight, oriented to person, place and time, alert    Disposition: home  Discharge Condition: Stable    Patient Instructions:   Current Discharge Medication List      START taking these medications    Details   pantoprazole (PROTONIX) 40 mg tablet Take 1 Tab by mouth Daily (before breakfast). Qty: 30 Tab, Refills: 1      polyethylene glycol (MIRALAX) 17 gram packet Take 1 Packet by mouth daily. Qty: 30 Packet, Refills: 0      senna-docusate (Senna with Docusate Sodium) 8.6-50 mg per tablet Take 1 Tab by mouth daily. Qty: 30 Tab, Refills: 0         CONTINUE these medications which have CHANGED    Details   aspirin delayed-release 81 mg tablet Take 1 Tab by mouth nightly. Qty: 30 Tab, Refills: 0         CONTINUE these medications which have NOT CHANGED    Details   allopurinoL (ZYLOPRIM) 100 mg tablet Take 200 mg by mouth daily as needed (When gout acts up). simethicone (Gas-X Extra Strength) 125 mg chewable tablet Take 125 mg by mouth two (2) times daily as needed for Flatulence. omega 3-DHA-EPA-fish oil 1,000 mg (120 mg-180 mg) capsule Take 1 Cap by mouth daily. co-enzyme Q-10 (Co Q-10) 100 mg capsule Take 100 mg by mouth daily. atenoloL (TENORMIN) 50 mg tablet Take 25 mg by mouth two (2) times a day. vitamin B-K-C-lutein-minerals (OCUVITE) tablet Take 1 Tab by mouth two (2) times a day. famotidine (Pepcid AC) 10 mg tablet Take 10 mg by mouth once as needed for Heartburn. calcium carbonate (CLARA-SELTZER ANTACID PO) Take 1 Tab by mouth once as needed (Acid Reflux).       levothyroxine (SYNTHROID) 50 mcg tablet Take 1 Tab by mouth Daily (before breakfast). Qty: 90 Tab, Refills: 1      Januvia 100 mg tablet take 1 tablet by mouth IN THE MORNING  Qty: 90 Tab, Refills: 1      alendronate (FOSAMAX) 70 mg tablet take 1 tablet by mouth every week  Qty: 12 Tab, Refills: 1      simvastatin (ZOCOR) 10 mg tablet take 1 tablet by mouth at bedtime  Qty: 90 Tab, Refills: 1    Comments: d/c 20mg dose. -KL      glipiZIDE (GLUCOTROL) 5 mg tablet take 1 tablet by mouth twice a day  Qty: 180 Tab, Refills: 1      ketoconazole (NIZORAL) 2 % topical cream as needed. multivitamin (ONE A DAY) tablet Take 1 Tab by mouth daily.            Activity: Activity as tolerated  Diet: Diabetic Diet  Wound Care: None needed    Follow-up with  Follow-up Information     Follow up With Specialties Details Why [address] Urgent Care   900 [facility]  [address] Chaparrita Rodriguez MD Family Medicine Schedule an appointment as soon as possible for a visit in 1 week  [place] [address] [address] [place] [address] 601 [facility] Chaparrita Castrejon MD Gastroenterology Schedule an appointment as soon as possible for a visit in 2 weeks  [address] [place] [address]  [phone]            Follow-up tests/labs none    Signed:  Angie Vaughn MD  2/10/2021  9:12 AM  **I personally spent 35 min on discharge**

## 2021-02-10 NOTE — PROGRESS NOTES
[facility] 79  [address] [facility], [address] [place] [address]  [phone]      Medical Progress Note      NAME: Tildon Hashimoto   :  1940  MRM:  [id number]    Date/Time of service: 02/09/10  9:00 AM       Subjective:     Chief Complaint:  Patient was personally seen and examined by me during this time period. Chart reviewed. Doing well. No chest pain       Objective:       Vitals:       Last 24hrs VS reviewed since prior progress note.  Most recent are:    Visit Vitals  BP (!) 148/68 (BP 1 Location: Right upper arm, BP Patient Position: At rest)   Pulse 80   Temp 98.4 °F (36.9 °C)   Resp 16   Ht 5' 2\" (1.575 m)   Wt 73.5 kg (162 lb)   SpO2 97%   BMI 29.63 kg/m²     SpO2 Readings from Last 6 Encounters:   02/10/21 97%   20 92%   20 98%   20 96%   18 100%   17 100%            Intake/Output Summary (Last 24 hours) at 2/10/2021 1383  Last data filed at 2/10/2021 0750  Gross per 24 hour   Intake 3296 ml   Output 1 ml   Net 3295 ml        Exam:     Physical Exam:    Gen:  Elderly, frail, NAD  HEENT:  Pink conjunctivae, PERRL, hearing intact to voice, pale mucous membranes  Neck:  Supple, without masses, thyroid non-tender  Resp:  No accessory muscle use, clear breath sounds without wheezes rales or rhonchi  Card:  No murmurs, normal S1, S2 without thrills, bruits or peripheral edema  Abd:  Soft, non-tender, non-distended, normoactive bowel sounds are present  Musc:  No cyanosis or clubbing  Skin:  No rashes  Neuro:  Cranial nerves 3-12 are grossly intact, follows commands appropriately  Psych:  Good insight, oriented to person, place and time, alert    Medications Reviewed: (see below)    Lab Data Reviewed: (see below)    ______________________________________________________________________    Medications:     Current Facility-Administered Medications   Medication Dose Route Frequency    0.9% sodium chloride infusion 250 mL  250 mL IntraVENous PRN    pantoprazole (PROTONIX) tablet 40 mg  40 mg Oral ACB    0.9% sodium chloride infusion 250 mL  250 mL IntraVENous PRN    0.9% sodium chloride infusion  100 mL/hr IntraVENous CONTINUOUS    sodium chloride (NS) flush 5-40 mL  5-40 mL IntraVENous Q8H    sodium chloride (NS) flush 5-40 mL  5-40 mL IntraVENous PRN    acetaminophen (TYLENOL) tablet 650 mg  650 mg Oral Q6H PRN    Or    acetaminophen (TYLENOL) suppository 650 mg  650 mg Rectal Q6H PRN    polyethylene glycol (MIRALAX) packet 17 g  17 g Oral DAILY PRN    bisacodyL (DULCOLAX) suppository 10 mg  10 mg Rectal DAILY PRN    ondansetron (ZOFRAN) injection 4 mg  4 mg IntraVENous Q6H PRN    insulin lispro (HUMALOG) injection   SubCUTAneous AC&HS    glucose chewable tablet 16 g  4 Tab Oral PRN    dextrose (D50W) injection syrg 12.5-25 g  12.5-25 g IntraVENous PRN    glucagon (GLUCAGEN) injection 1 mg  1 mg IntraMUSCular PRN    cefTRIAXone (ROCEPHIN) 1 g in sterile water (preservative free) 10 mL IV syringe  1 g IntraVENous Q24H          Lab Review:     Recent Labs     02/10/21  0545 02/09/21  2209 02/09/21  0340 02/08/21  0124 02/08/21  0124   WBC 5.9  --  5.4  --  9.4   HGB 8.9* 9.0* 6.8*   < > 9.8*   HCT 27.0* 27.9* 21.3*   < > 29.9*     --  165  --  206    < > = values in this interval not displayed.      Recent Labs     02/10/21  0545 02/09/21  0340 02/08/21  0238 02/08/21  0124    143 143 141   K 3.9 4.4 5.2* 5.6*   * 114* 113* 109*   CO2 23 26 24 25   GLU 93 87 179* 229*   BUN 20 34* 50* 50*   CREA 1.05* 1.13* 1.31* 1.57*   CA 8.4* 7.6* 7.6* 8.1*   MG 2.8* 2.0  --  1.8   PHOS 3.1 3.2  --   --    ALB  --  2.5*  --  3.0*   TBILI  --  0.4  --  0.3   ALT  --  17  --  28   INR  --   --   --  1.1     Lab Results   Component Value Date/Time    Glucose (POC) 114 (H) 02/10/2021 08:16 AM    Glucose (POC) 204 (H) 02/09/2021 09:16 PM    Glucose (POC) 151 (H) 02/09/2021 11:32 AM    Glucose (POC) 110 (H) 02/09/2021 08:07 AM    Glucose (POC) 120 (H) 02/08/2021 08:02 PM          Assessment / Plan:     77 yo hx of HTN, DM, hypothyroid, presented w/ syncope, melena    1) Syncope: likely due to GI bleed, vasovagal.  Head CT unremarkable. Echo with EF 55-60%. Carotid dopplers neg. No further w/u per Cards    2) Acute GI bleed/melena: could be from NSAIDS. GI performed an EGD w/ showed esophageal stricture s/p dilation. Colonoscopy with diverticulosis. Cont PPI    3) Acute blood loss anemia: due to above. S/p 1u RBCs. Hgb now stable     4) HTN: hold BP meds due to syncope. Consult pharm for med rec    5) DM type 2: A1C 6.6%.   Cont SSI    6) Hypothyroid: cont synthroid    7) UTI: s/p IV CTX    Total time spent with patient: 35 min **I personally saw and examined the patient during this time period**                 Care Plan discussed with: Patient, nursing     Discussed:  Care Plan    Prophylaxis:  SCD's    Disposition:  Home w/Family           ___________________________________________________    Attending Physician: David Pineda MD

## 2021-02-10 NOTE — ANESTHESIA POSTPROCEDURE EVALUATION
Procedure(s):  ESOPHAGOGASTRODUODENOSCOPY (EGD)  COLONOSCOPY  ESOPHAGEAL DILATION.     MAC    Anesthesia Post Evaluation        Patient location during evaluation: bedside  Level of consciousness: awake  Pain management: satisfactory to patient  Airway patency: patent  Anesthetic complications: no  Cardiovascular status: acceptable  Respiratory status: acceptable  Hydration status: acceptable        INITIAL Post-op Vital signs:   Vitals Value Taken Time   /68 02/10/21 0750   Temp 36.9 °C (98.4 °F) 02/10/21 0750   Pulse 80 02/10/21 0750   Resp 16 02/10/21 0750   SpO2 97 % 02/10/21 0750

## 2021-02-10 NOTE — DISCHARGE INSTRUCTIONS
HOSPITALIST DISCHARGE INSTRUCTIONS  NAME: Miko Javed   :  1940   MRN:  [id number]     Date/Time:  2/10/2021 9:10 AM    ADMIT DATE: 2021     DISCHARGE DATE: 2/10/2021     ADMITTING DIAGNOSIS:  GI bleed from diverticulosis and esophageal ulcer, syncope    DISCHARGE DIAGNOSIS:  same    MEDICATIONS:  See after visit summary       · It is important that you take the medication exactly as they are prescribed. · Keep your medication in the bottles provided by the pharmacist and keep a list of the medication names, dosages, and times to be taken in your wallet. · Do not take other medications without consulting your doctor     Pain Management: per above medications    What to do at Home    Recommended diet:  Diabetic Diet    Recommended activity: Activity as tolerated    1) Return to the hospital if you feel worse    2) If you experience any of the following symptoms then please call your primary care physician or return to the emergency room if you cannot get hold of your doctor:  Fever, chills, nausea, vomiting, diarrhea, change in mentation, falling, bleeding, shortness of breath, chest pain, severe headache, severe abdominal pain,     3) Avoid constipation    4) Do not take NSAIDS (ibuprofen, motrin, aleve, naproxen, etc.. )    5) Hold your aspirin for 5 days    6) Follow up with your doctors     Follow Up:   Follow-up Information     Follow up With Specialties Details Why [address] Urgent Care   900 [facility]  Suite [facility] Chaparrita Mc MD [facility] Medicine Schedule an appointment as soon as possible for a visit in 1 week  [facility] [address] [facility] Chaparrita Leger MD Gastroenterology Schedule an appointment as soon as possible for a visit in 2 weeks  [place] [address] [address]  [phone]              Information obtained by :  I understand that if any problems occur once I am at home I am to contact my physician. I understand and acknowledge receipt of the instructions indicated above. [de-identified] or R.N.'s Signature                                                                  Date/Time                                                                                                                                              Patient or Representative Signature                                                          Date/Time    Patient Education        Diverticulosis: Care Instructions  Your Care Instructions  In diverticulosis, pouches called diverticula form in the wall of the large intestine (colon). The pouches do not cause any pain or other symptoms. Most people who have diverticulosis do not know they have it. But the pouches sometimes bleed, and if they become infected, they can cause pain and other symptoms. When this happens, it is called diverticulitis. Diverticula form when pressure pushes the wall of the colon outward at certain weak points. A diet that is too low in fiber can cause diverticula. Follow-up care is a key part of your treatment and safety. Be sure to make and go to all appointments, and call your doctor if you are having problems. It's also a good idea to know your test results and keep a list of the medicines you take. How can you care for yourself at home? · Include fruits, leafy green vegetables, beans, and whole grains in your diet each day. These foods are high in fiber. · Take a fiber supplement, such as Citrucel or Metamucil, every day if needed. Read and follow all instructions on the label. · Drink plenty of fluids, enough so that your urine is light yellow or clear like water.  If you have kidney, heart, or liver disease and have to limit fluids, talk with your doctor before you increase the amount of fluids you drink. · Get at least 30 minutes of exercise on most days of the week. Walking is a good choice. You also may want to do other activities, such as running, swimming, cycling, or playing tennis or team sports. · Cut out foods that cause gas, pain, or other symptoms. When should you call for help? Call your doctor now or seek immediate medical care if:    · You have belly pain.     · You pass maroon or very bloody stools.     · You have a fever.     · You have nausea and vomiting.     · You have unusual changes in your bowel movements or abdominal swelling.     · You have burning pain when you urinate.     · You have abnormal vaginal discharge.     · You have shoulder pain.     · You have cramping pain that does not get better when you have a bowel movement or pass gas.     · You pass gas or stool from your urethra while urinating. Watch closely for changes in your health, and be sure to contact your doctor if you have any problems. Where can you learn more? Go to http://www.gray.com/  Enter [id number] in the search box to learn more about \"Diverticulosis: Care Instructions. \"  Current as of: April 15, 2020               Content Version: 12.6  © 7144-5934 [employer]. Care instructions adapted under license by [employer] (which disclaims liability or warranty for this information). If you have questions about a medical condition or this instruction, always ask your healthcare professional. [place] [address] any warranty or liability for your use of this information. Patient Education        Peptic Ulcer Disease: Care Instructions  Your Care Instructions     Peptic ulcers are sores on the inside of the stomach or the small intestine (such as a duodenal ulcer). They are most often caused by an infection with bacteria or from use of nonsteroidal anti-inflammatory drugs (NSAIDs).  NSAIDs include aspirin, ibuprofen (Advil), and naproxen (Aleve).  Your doctor may have prescribed medicine to reduce stomach acid. You also may need to take antibiotics if your peptic ulcers are caused by an infection. You can help yourself heal and keep ulcers from coming back by making some changes in your lifestyle. Quit smoking. Limit alcohol.  Follow-up care is a key part of your treatment and safety. Be sure to make and go to all appointments, and call your doctor if you are having problems. It's also a good idea to know your test results and keep a list of the medicines you take.  How can you care for yourself at home?  · Be safe with medicines. Take your medicines exactly as prescribed. Call your doctor if you think you are having a problem with your medicine.  · Do not take aspirin or other NSAIDs such as ibuprofen (Advil or Motrin) or naproxen (Aleve). Ask your doctor what you can take for pain.  · Do not smoke. Smoking can make ulcers worse. If you need help quitting, talk to your doctor about stop-smoking programs and medicines. These can increase your chances of quitting for good.  · Drink in moderation or avoid drinking alcohol.  · Eat a balanced diet of small, frequent meals. See a dietitian if you need help planning your meals.  When should you call for help?   Call 911 anytime you think you may need emergency care. For example, call if:    · You vomit blood or what looks like coffee grounds.     · You pass maroon or very bloody stools.   Call your doctor now or seek immediate medical care if:    · You have new or worse belly pain.     · Your stools are black and look like tar, or they have streaks of blood.     · You vomit.   Watch closely for changes in your health, and be sure to contact your doctor if:    · You do not get better as expected.   Where can you learn more?  Go to https://www.healthwise.net/GoodHelpConnections  Enter Z086 in the search box to learn more about \"Peptic Ulcer Disease: Care Instructions.\"  Current as of: April 15,  2020               Content Version: 12.6  © 6538-0290 "[employer]. Care instructions adapted under license by [employer] (which disclaims liability or warranty for this information). If you have questions about a medical condition or this instruction, always ask your healthcare professional. [address] any warranty or liability for your use of this information.

## 2021-02-12 LAB
ABO + RH BLD: NORMAL
BLD PROD TYP BPU: NORMAL
BLD PROD TYP BPU: NORMAL
BLOOD GROUP ANTIBODIES SERPL: NORMAL
BPU ID: NORMAL
BPU ID: NORMAL
CROSSMATCH RESULT,%XM: NORMAL
CROSSMATCH RESULT,%XM: NORMAL
SPECIMEN EXP DATE BLD: NORMAL
STATUS OF UNIT,%ST: NORMAL
STATUS OF UNIT,%ST: NORMAL
UNIT DIVISION, %UDIV: 0
UNIT DIVISION, %UDIV: 0

## 2021-03-03 RX ORDER — GLIPIZIDE 5 MG/1
TABLET ORAL
Qty: 180 TAB | Refills: 1 | Status: SHIPPED | OUTPATIENT
Start: 2021-03-03 | End: 2021-09-09

## 2021-04-30 RX ORDER — SIMVASTATIN 10 MG/1
TABLET, FILM COATED ORAL
Qty: 90 TAB | Refills: 1 | Status: SHIPPED | OUTPATIENT
Start: 2021-04-30 | End: 2021-11-01

## 2021-05-12 RX ORDER — ALENDRONATE SODIUM 70 MG/1
TABLET ORAL
Qty: 12 TAB | Refills: 1 | Status: SHIPPED | OUTPATIENT
Start: 2021-05-12 | End: 2021-10-25

## 2021-06-09 ENCOUNTER — TRANSCRIBE ORDER (OUTPATIENT)
Dept: SCHEDULING | Age: 81
End: 2021-06-09

## 2021-06-09 DIAGNOSIS — M54.17 LUMBOSACRAL NEURITIS: Primary | ICD-10-CM

## 2021-06-09 DIAGNOSIS — M51.36 DEGENERATION OF LUMBAR INTERVERTEBRAL DISC: ICD-10-CM

## 2021-06-13 ENCOUNTER — HOSPITAL ENCOUNTER (OUTPATIENT)
Dept: MRI IMAGING | Age: 81
Discharge: HOME | End: 2021-06-13
Attending: PHYSICAL MEDICINE & REHABILITATION
Payer: MEDICARE

## 2021-06-13 ENCOUNTER — TRANSCRIBE ORDER (OUTPATIENT)
Dept: REGISTRATION | Age: 81
End: 2021-06-13

## 2021-06-13 ENCOUNTER — HOSPITAL ENCOUNTER (OUTPATIENT)
Dept: GENERAL RADIOLOGY | Age: 81
Discharge: HOME | End: 2021-06-13
Attending: PHYSICAL MEDICINE & REHABILITATION
Payer: MEDICARE

## 2021-06-13 DIAGNOSIS — M54.17 LUMBOSACRAL NEURITIS: ICD-10-CM

## 2021-06-13 DIAGNOSIS — M51.36 DEGENERATION OF LUMBAR INTERVERTEBRAL DISC: ICD-10-CM

## 2021-06-13 DIAGNOSIS — M54.17 LUMBOSACRAL NEURITIS: Primary | ICD-10-CM

## 2021-06-13 PROCEDURE — 72148 MRI LUMBAR SPINE W/O DYE: CPT

## 2021-06-13 PROCEDURE — 72110 X-RAY EXAM L-2 SPINE 4/>VWS: CPT

## 2021-07-19 NOTE — PROGRESS NOTES
1. Have you been to the ER, urgent care clinic since your last visit? Hospitalized since your last visit? patient first yesterday     2. Have you seen or consulted any other health care providers outside of the [address] [place] [address] since your last visit? Include any pap smears or colon screening.  no EMERGENCY DEPARTMENT HISTORY AND PHYSICAL EXAM      Date: 7/19/2021  Patient Name: Walter Arce    History of Presenting Illness     Chief Complaint   Patient presents with    Vomiting     x 1 week. pt presents to triage diaphoretic with rapid pulse       History Provided By: Patient    HPI: Walter Arce, 44 y.o. male presents to the ED with cc of leg pain and vomiting. 78-year-old male presents emergency department with a chief complaint of vomiting. Patient reports symptoms have been present for 1 week. He reports postprandial nausea and vomiting. Denies abdominal pain. Patient states he eats something and then approximately 30 minutes later will vomit. Patient states he has been constipated, his last bowel movement was Thursday. Patient denies any abdominal pain. Denies any chest pain or shortness of breath. Patient also endorses \"weeks\" of left leg pain. The pain is located in his left leg, is constant, worse with sitting and improved with laying. The pain radiates from his back along his left buttocks into his left leg. Of note he has a left leg prosthesis. Denies any trauma. Patient does have a history of DVT or PE, he has not been taking Xarelto due to cost.    There are no other complaints, changes, or physical findings at this time. PCP: Ladan Evans MD    No current facility-administered medications on file prior to encounter. Current Outpatient Medications on File Prior to Encounter   Medication Sig Dispense Refill    gabapentin (Neurontin) 300 mg capsule Take 1 Capsule by mouth three (3) times daily. Max Daily Amount: 900 mg. 90 Capsule 0    hydrALAZINE (APRESOLINE) 100 mg tablet Take 1 Tablet by mouth three (3) times daily for 60 days. 90 Tablet 1    cloNIDine HCL (CATAPRES) 0.2 mg tablet Take 1 Tablet by mouth three (3) times daily for 60 days.  90 Tablet 1    insulin NPH/insulin regular (NOVOLIN 70/30, HUMULIN 70/30) 100 unit/mL (70-30) injection 10 Units by SubCUTAneous route Before breakfast and dinner. 72 mL 0    Xarelto 20 mg tab tablet Take 1 tablet by mouth once daily 30 Tab 0    carvediloL (COREG) 25 mg tablet Take 1 Tab by mouth two (2) times daily (with meals). 60 Tab 2    Blood-Glucose Meter monitoring kit Please check your sugars 3-4x daily 1 Kit 0    isosorbide mononitrate ER (IMDUR) 30 mg tablet Take 1 Tab by mouth daily. 30 Tab 5    atorvastatin (LIPITOR) 40 mg tablet Take 1 Tab by mouth daily.  30 Tab 5    lancets misc Please check your sugars 3-4x daily 1 Each 11    glucose blood VI test strips (ASCENSIA AUTODISC VI, ONE TOUCH ULTRA TEST VI) strip Please check your sugars 3-4x daily 100 Strip 5    Insulin Syringe-Needle U-100 (BD Insulin Syringe) 1 mL 27 gauge x 1/2\" syrg Use BID w novolog Dx E11.9 100 Syringe 2    Insulin Needles, Disposable, 31 gauge x 5/16\" ndle Use four timed daily w insulin 100 Package 11       Past History     Past Medical History:  Past Medical History:   Diagnosis Date    Diabetes ([facility] [address]     since age 22   [de-identified] GERD (gastroesophageal reflux disease)     HTN (hypertension)     Hypercholesteremia     Hyperlipidemia     Psychiatric disorder     anxiety & depression    Thromboembolus ([facility] [address] 2017    bilateral legs       Past Surgical History:  Past Surgical History:   Procedure Laterality Date    HX AMPUTATION      toes- left foot    HX AMPUTATION Left 08/2017    BKA    HX BUNIONECTOMY      HX ORTHOPAEDIC      boutinerre deformity    HX ORTHOPAEDIC      fascia removed left foot    HX OTHER SURGICAL  03/2017    Skin graft Surgery    HX TONSILLECTOMY      HX WISDOM TEETH EXTRACTION         Family History:  Family History   Problem Relation Age of Onset    Hypertension Mother     High Cholesterol Mother        Social History:  Social History     Tobacco Use    Smoking status: Current Some Day Smoker     Packs/day: 0.25     Years: 3.00     Pack years: 0.75     Types: Cigars     Last attempt to quit: 4/22/2011     Years since quitting: 10.2    Smokeless tobacco: Never Used   Substance Use Topics    Alcohol use: Yes     Alcohol/week: 0.0 standard drinks     Types: 1 Glasses of wine per week     Comment: weekly    Drug use: Yes     Types: Marijuana     Comment: current marijuana for pain/anxiety/1-2 week       Allergies: Allergies   Allergen Reactions    Ace Inhibitors Swelling     Facial swelling cough     Morphine Angioedema    Arb-Angiotensin Receptor Antagonist Angioedema         Review of Systems   Review of Systems   Constitutional: Negative for fever. HENT: Negative for voice change. Eyes: Negative for pain and redness. Respiratory: Negative for cough and chest tightness. Cardiovascular: Negative for chest pain and leg swelling. Gastrointestinal: Positive for nausea and vomiting. Negative for abdominal pain and diarrhea. Genitourinary: Negative for hematuria. Musculoskeletal: Positive for myalgias. Negative for gait problem. Skin: Negative for color change, pallor and rash. Neurological: Negative for facial asymmetry, weakness and headaches. Hematological: Does not bruise/bleed easily. Psychiatric/Behavioral: Negative for behavioral problems. All other systems reviewed and are negative. Physical Exam   Physical Exam  Vitals and nursing note reviewed. Constitutional:       Comments: 66-year-old male, resting in bed, no distress   HENT:      Head: Normocephalic and atraumatic. Nose: Nose normal.      Mouth/Throat:      Mouth: Mucous membranes are moist.   Eyes:      Pupils: Pupils are equal, round, and reactive to light. Cardiovascular:      Rate and Rhythm: Regular rhythm. Tachycardia present. Pulses: Normal pulses. Heart sounds: No murmur heard. No friction rub. No gallop. Pulmonary:      Effort: Pulmonary effort is normal.      Breath sounds: Normal breath sounds. No wheezing, rhonchi or rales.       Comments: Saturating 100% on room air  Abdominal:      General: Abdomen is flat. There is no distension. Palpations: Abdomen is soft. Tenderness: There is no abdominal tenderness. Musculoskeletal:         General: Normal range of motion. Cervical back: Normal range of motion. Right lower leg: No edema. Left lower leg: No edema. Comments: Left leg surgically absent. Left leg prosthesis in place    Mild tenderness to palpation along left buttock and thigh. Skin:     General: Skin is warm and dry. Capillary Refill: Capillary refill takes less than 2 seconds. Comments: No zoster rash. Neurological:      General: No focal deficit present. Mental Status: He is alert. Psychiatric:         Mood and Affect: Mood normal.         Diagnostic Study Results     Labs -     Recent Results (from the past 12 hour(s))   GLUCOSE, POC    Collection Time: 07/19/21  1:29 PM   Result Value Ref Range    Glucose (POC) 243 (H) 65 - 117 mg/dL    Performed by Gene Alvarez (EDT)    EKG, 12 LEAD, INITIAL    Collection Time: 07/19/21  1:44 PM   Result Value Ref Range    Ventricular Rate 121 BPM    Atrial Rate 121 BPM    P-R Interval 128 ms    QRS Duration 164 ms    Q-T Interval 380 ms    QTC Calculation (Bezet) 539 ms    Calculated P Axis 48 degrees    Calculated R Axis 0 degrees    Calculated T Axis 127 degrees    Diagnosis       Sinus tachycardia  Possible Left atrial enlargement  Left bundle branch block  When compared with ECG of 29-JUN-2021 13:12,  Left bundle branch block is now present  Confirmed by Eric Baugh P.VCaryn ([id number]) on 7/19/2021 3:50:29 PM     CBC WITH AUTOMATED DIFF    Collection Time: 07/19/21  2:45 PM   Result Value Ref Range    WBC 14.7 (H) 4.1 - 11.1 K/uL    RBC 5.05 4. 10 - 5.70 M/uL    HGB 13.1 12.1 - 17.0 g/dL    HCT 42.2 36.6 - 50.3 %    MCV 83.6 80.0 - 99.0 FL    MCH 25.9 (L) 26.0 - 34.0 PG    MCHC 31.0 30.0 - 36.5 g/dL    RDW 13.2 11.5 - 14.5 %    PLATELET 118 893 - 312 K/uL    MPV 10.8 8.9 - 12.9 FL    NRBC 0.0 0  WBC ABSOLUTE NRBC 0.00 0.00 - 0.01 K/uL    NEUTROPHILS 78 (H) 32 - 75 %    LYMPHOCYTES 15 12 - 49 %    MONOCYTES 4 (L) 5 - 13 %    EOSINOPHILS 1 0 - 7 %    BASOPHILS 1 0 - 1 %    IMMATURE GRANULOCYTES 1 (H) 0.0 - 0.5 %    ABS. NEUTROPHILS 11.6 (H) 1.8 - 8.0 K/UL    ABS. LYMPHOCYTES 2.2 0.8 - 3.5 K/UL    ABS. MONOCYTES 0.7 0.0 - 1.0 K/UL    ABS. EOSINOPHILS 0.1 0.0 - 0.4 K/UL    ABS. BASOPHILS 0.1 0.0 - 0.1 K/UL    ABS. IMM. GRANS. 0.1 (H) 0.00 - 0.04 K/UL    DF AUTOMATED     METABOLIC PANEL, COMPREHENSIVE    Collection Time: 07/19/21  2:45 PM   Result Value Ref Range    Sodium 137 136 - 145 mmol/L    Potassium 3.9 3.5 - 5.1 mmol/L    Chloride 103 97 - 108 mmol/L    CO2 25 21 - 32 mmol/L    Anion gap 9 5 - 15 mmol/L    Glucose 260 (H) 65 - 100 mg/dL    BUN 24 (H) 6 - 20 MG/DL    Creatinine 3.89 (H) 0.70 - 1.30 MG/DL    BUN/Creatinine ratio 6 (L) 12 - 20      GFR est AA 20 (L) >60 ml/min/1.73m2    GFR est non-AA 16 (L) >60 ml/min/1.73m2    Calcium 9.4 8.5 - 10.1 MG/DL    Bilirubin, total 0.4 0.2 - 1.0 MG/DL    ALT (SGPT) 26 12 - 78 U/L    AST (SGOT) 19 15 - 37 U/L    Alk. phosphatase 105 45 - 117 U/L    Protein, total 7.7 6.4 - 8.2 g/dL    Albumin 2.9 (L) 3.5 - 5.0 g/dL    Globulin 4.8 (H) 2.0 - 4.0 g/dL    A-G Ratio 0.6 (L) 1.1 - 2.2     TROPONIN I    Collection Time: 07/19/21  2:45 PM   Result Value Ref Range    Troponin-I, Qt. 0.19 (H) <0.05 ng/mL   LIPASE    Collection Time: 07/19/21  2:45 PM   Result Value Ref Range    Lipase 114 73 - 393 U/L       Radiologic Studies -   NM LUNG SCAN PERF   Final Result   Normal perfusion imaging. DUPLEX LOWER EXT VENOUS BILAT   Final Result      XR CHEST PA LAT    (Results Pending)     CT Results  (Last 48 hours)    None        CXR Results  (Last 48 hours)    None          Medical Decision Making   I am the first provider for this patient.     I reviewed the vital signs, available nursing notes, past medical history, past surgical history, family history and social history. Vital Signs-Reviewed the patient's vital signs. Patient Vitals for the past 12 hrs:   Temp Pulse Resp BP SpO2   07/19/21 1709 -- 88 20 (!) 221/122 100 %   07/19/21 1530 -- 98 16 (!) 193/109 100 %   07/19/21 1500 -- (!) 111 21 (!) 185/109 99 %   07/19/21 1430 -- (!) 105 17 (!) 167/103 99 %   07/19/21 1330 97.9 °F (36.6 °C) (!) 136 20 (!) 140/109 97 %     Records Reviewed: Nursing Notes, Old Medical Records, Previous electrocardiograms and Previous Laboratory Studies    Provider Notes (Medical Decision Making):     26-year-old male presents emergency department with vomiting. Abdominal exam is benign. He is tachycardic upon arrival, tachycardic on my evaluation tachypneic, not hypoxic. Patient does have a new left bundle branch noted on his EKG. Denies any history. History of CKD will screen for electrolyte abnormalities. At this time, do not believe CT is necessary of his abdomen, will check duplex of his left AKA to evaluate for DVT given his history and noncompliance with anticoagulation. CTA chest if his creatinine allows. Medicate for pain and bolus fluids. ED Course:   Initial assessment performed. The patients presenting problems have been discussed, and they are in agreement with the care plan formulated and outlined with them. I have encouraged them to ask questions as they arise throughout their visit. ED Course as of Jul 19 1818   Mon Jul 19, 2021   1435 Preliminary EKG interpreted by me. Shows sinus tachycardia with a HR of 121. Left bundle branch pattern new. [MB]   1630 Labs show a leuokocytosis to 15. CMP shows stable renal function. Troponin elevated with new LBBB. Duplex negative, CTA cancelled awaiting V/Q due to renal function. [MB]      ED Course User Index  [MB] Silvia Evans MD     VQ scan negative. Given new left bundle branch block and mildly elevated troponin will discuss with hospitalist for observation. Patient denies chest pain.   Do not believe anticoagulation is necessary at this time. He is hypertensive, patient did not take his high blood pressure medicines. Patient has seen Dr. Royce Servin in past, will consult [facility]. Ptaty Tolbert MD      Disposition:    Admitted      Diagnosis     Clinical Impression:   1. Non-intractable vomiting with nausea, unspecified vomiting type    2. Left bundle branch block (LBBB)    3. Elevated troponin    4. Arthralgia of left lower leg        Attestations:    Patty Tolbert MD    Please note that this dictation was completed with "[employer] the computer voice recognition software. Quite often unanticipated grammatical, syntax, homophones, and other interpretive errors are inadvertently transcribed by the computer software. Please disregard these errors. Please excuse any errors that have escaped final proofreading. Thank you.

## 2021-07-27 ENCOUNTER — LAB ONLY (OUTPATIENT)
Dept: FAMILY MEDICINE CLINIC | Age: 81
End: 2021-07-27

## 2021-07-27 DIAGNOSIS — E78.5 HYPERLIPIDEMIA, UNSPECIFIED HYPERLIPIDEMIA TYPE: ICD-10-CM

## 2021-07-27 DIAGNOSIS — E03.9 HYPOTHYROIDISM, UNSPECIFIED TYPE: ICD-10-CM

## 2021-07-27 DIAGNOSIS — E11.9 TYPE 2 DIABETES MELLITUS WITHOUT COMPLICATION, WITHOUT LONG-TERM CURRENT USE OF INSULIN (HCC): Primary | ICD-10-CM

## 2021-07-28 LAB
ALBUMIN SERPL-MCNC: 4 G/DL (ref 3.6–4.6)
ALBUMIN/GLOB SERPL: 1.7 {RATIO} (ref 1.2–2.2)
ALP SERPL-CCNC: 63 IU/L (ref 48–121)
ALT SERPL-CCNC: 14 IU/L (ref 0–32)
AST SERPL-CCNC: 19 IU/L (ref 0–40)
BASOPHILS # BLD AUTO: 0 X10E3/UL (ref 0–0.2)
BASOPHILS NFR BLD AUTO: 1 %
BILIRUB SERPL-MCNC: 0.3 MG/DL (ref 0–1.2)
BUN SERPL-MCNC: 19 MG/DL (ref 8–27)
BUN/CREAT SERPL: 16 (ref 12–28)
CALCIUM SERPL-MCNC: 9.4 MG/DL (ref 8.7–10.3)
CHLORIDE SERPL-SCNC: 103 MMOL/L (ref 96–106)
CHOLEST SERPL-MCNC: 159 MG/DL (ref 100–199)
CO2 SERPL-SCNC: 27 MMOL/L (ref 20–29)
CREAT SERPL-MCNC: 1.19 MG/DL (ref 0.57–1)
EOSINOPHIL # BLD AUTO: 0.4 X10E3/UL (ref 0–0.4)
EOSINOPHIL NFR BLD AUTO: 5 %
ERYTHROCYTE [DISTWIDTH] IN BLOOD BY AUTOMATED COUNT: 14 % (ref 11.7–15.4)
EST. AVERAGE GLUCOSE BLD GHB EST-MCNC: 131 MG/DL
GLOBULIN SER CALC-MCNC: 2.3 G/DL (ref 1.5–4.5)
GLUCOSE SERPL-MCNC: 76 MG/DL (ref 65–99)
HBA1C MFR BLD: 6.2 % (ref 4.8–5.6)
HCT VFR BLD AUTO: 37.9 % (ref 34–46.6)
HDLC SERPL-MCNC: 38 MG/DL
HGB BLD-MCNC: 12.2 G/DL (ref 11.1–15.9)
IMM GRANULOCYTES # BLD AUTO: 0 X10E3/UL (ref 0–0.1)
IMM GRANULOCYTES NFR BLD AUTO: 0 %
LDLC SERPL CALC-MCNC: 78 MG/DL (ref 0–99)
LYMPHOCYTES # BLD AUTO: 2.2 X10E3/UL (ref 0.7–3.1)
LYMPHOCYTES NFR BLD AUTO: 27 %
MCH RBC QN AUTO: 31.8 PG (ref 26.6–33)
MCHC RBC AUTO-ENTMCNC: 32.2 G/DL (ref 31.5–35.7)
MCV RBC AUTO: 99 FL (ref 79–97)
MONOCYTES # BLD AUTO: 1 X10E3/UL (ref 0.1–0.9)
MONOCYTES NFR BLD AUTO: 12 %
NEUTROPHILS # BLD AUTO: 4.5 X10E3/UL (ref 1.4–7)
NEUTROPHILS NFR BLD AUTO: 55 %
PLATELET # BLD AUTO: 223 X10E3/UL (ref 150–450)
POTASSIUM SERPL-SCNC: 4.1 MMOL/L (ref 3.5–5.2)
PROT SERPL-MCNC: 6.3 G/DL (ref 6–8.5)
RBC # BLD AUTO: 3.84 X10E6/UL (ref 3.77–5.28)
SODIUM SERPL-SCNC: 145 MMOL/L (ref 134–144)
T4 FREE SERPL-MCNC: 1.05 NG/DL (ref 0.82–1.77)
TRIGL SERPL-MCNC: 260 MG/DL (ref 0–149)
TSH SERPL DL<=0.005 MIU/L-ACNC: 4.25 UIU/ML (ref 0.45–4.5)
VLDLC SERPL CALC-MCNC: 43 MG/DL (ref 5–40)
WBC # BLD AUTO: 8.1 X10E3/UL (ref 3.4–10.8)

## 2021-07-29 ENCOUNTER — OFFICE VISIT (OUTPATIENT)
Dept: FAMILY MEDICINE CLINIC | Age: 81
End: 2021-07-29
Payer: MEDICARE

## 2021-07-29 DIAGNOSIS — E78.00 HYPERCHOLESTEREMIA: ICD-10-CM

## 2021-07-29 DIAGNOSIS — I10 HTN (HYPERTENSION), BENIGN: Primary | ICD-10-CM

## 2021-07-29 DIAGNOSIS — E11.22 TYPE 2 DIABETES MELLITUS WITH CHRONIC KIDNEY DISEASE, WITHOUT LONG-TERM CURRENT USE OF INSULIN, UNSPECIFIED CKD STAGE (HCC): ICD-10-CM

## 2021-07-29 DIAGNOSIS — D62 ACUTE BLOOD LOSS ANEMIA: ICD-10-CM

## 2021-07-29 DIAGNOSIS — R55 SYNCOPE, UNSPECIFIED SYNCOPE TYPE: ICD-10-CM

## 2021-07-29 DIAGNOSIS — E78.5 HYPERLIPIDEMIA, UNSPECIFIED HYPERLIPIDEMIA TYPE: ICD-10-CM

## 2021-07-29 DIAGNOSIS — E03.9 HYPOTHYROIDISM, UNSPECIFIED TYPE: ICD-10-CM

## 2021-07-29 PROCEDURE — G8399 PT W/DXA RESULTS DOCUMENT: HCPCS | Performed by: NURSE PRACTITIONER

## 2021-07-29 PROCEDURE — 99215 OFFICE O/P EST HI 40 MIN: CPT | Performed by: NURSE PRACTITIONER

## 2021-07-29 PROCEDURE — 1101F PT FALLS ASSESS-DOCD LE1/YR: CPT | Performed by: NURSE PRACTITIONER

## 2021-07-29 PROCEDURE — G8432 DEP SCR NOT DOC, RNG: HCPCS | Performed by: NURSE PRACTITIONER

## 2021-07-29 PROCEDURE — 1090F PRES/ABSN URINE INCON ASSESS: CPT | Performed by: NURSE PRACTITIONER

## 2021-07-29 PROCEDURE — G8752 SYS BP LESS 140: HCPCS | Performed by: NURSE PRACTITIONER

## 2021-07-29 PROCEDURE — G8419 CALC BMI OUT NRM PARAM NOF/U: HCPCS | Performed by: NURSE PRACTITIONER

## 2021-07-29 PROCEDURE — G8427 DOCREV CUR MEDS BY ELIG CLIN: HCPCS | Performed by: NURSE PRACTITIONER

## 2021-07-29 PROCEDURE — G8536 NO DOC ELDER MAL SCRN: HCPCS | Performed by: NURSE PRACTITIONER

## 2021-07-29 PROCEDURE — G8754 DIAS BP LESS 90: HCPCS | Performed by: NURSE PRACTITIONER

## 2021-07-29 RX ORDER — LANOLIN ALCOHOL/MO/W.PET/CERES
3 CREAM (GRAM) TOPICAL
Qty: 90 TABLET | Refills: 1 | Status: SHIPPED | OUTPATIENT
Start: 2021-07-29 | End: 2022-05-02

## 2021-07-29 NOTE — PROGRESS NOTES
Subjective  Chief Complaint   Patient presents with    Follow Up Chronic Condition     HPI:  Fely Salguero is a 78 y.o. female. 77 yo female presents for f/u of chronic conditions. She had her labs drawn prior to this visit and we were able to discuss these and answer questions. She is also having trouble with insomnia but she does nap during the day and would like suggestions on meds    Past Medical History:   Diagnosis Date    Anxiety     Cancer [facility])     Skin of face.  Cataracts, bilateral     Chronic back pain     x1 yr;seeing Chiropractor for a year    Diabetes ([address]     GERD (gastroesophageal reflux disease)     Gout     Hypercholesterolemia     Hypertension     Hypothyroidism     Migraine     Shingles      Family History   Problem Relation Age of Onset    Diabetes Mother     Alzheimer Mother     Other Mother         Poliosis    Heart Attack Father      Social History     Socioeconomic History    Marital status:      Spouse name: Not on file    Number of children: Not on file    Years of education: Not on file    Highest education level: Not on file   Occupational History    Not on file   Tobacco Use    Smoking status: Never Smoker    Smokeless tobacco: Never Used   Substance and Sexual Activity    Alcohol use: No    Drug use: No    Sexual activity: Not on file   Other Topics Concern    Not on file   Social History Narrative    ** Merged History Encounter **          Social Determinants of Health     Financial Resource Strain:     Difficulty of Paying Living Expenses:    Food Insecurity:     Worried About Running Out of Food in the Last Year:     [address] in the Last Year:    Transportation Needs:     Lack of Transportation (Medical):      Lack of Transportation (Non-Medical):    Physical Activity:     Days of Exercise per Week:     Minutes of Exercise per Session:    Stress:     Feeling of Stress :    Social Connections:     Frequency of Communication with Friends and Family:     Frequency of Social Gatherings with Friends and Family:     Attends Sabianism Services:     Active Member of Clubs or Organizations:     Attends Club or Organization Meetings:     Marital Status:    Intimate Partner Violence:     Fear of Current or Ex-Partner:     Emotionally Abused:     Physically Abused:     Sexually Abused:      Current Outpatient Medications on File Prior to Visit   Medication Sig Dispense Refill    atenoloL (TENORMIN) 50 mg tablet take 1 tablet by mouth once daily 90 Tablet 1    levothyroxine (SYNTHROID) 50 mcg tablet take 1 tablet by mouth daily before breakfast 90 Tablet 0    alendronate (FOSAMAX) 70 mg tablet take 1 tablet by mouth every week 12 Tab 1    simvastatin (ZOCOR) 10 mg tablet take 1 tablet by mouth at bedtime (DISCONTINUE 20MG DOSE) 90 Tab 1    glipiZIDE (GLUCOTROL) 5 mg tablet take 1 tablet by mouth twice a day 180 Tab 1    aspirin delayed-release 81 mg tablet Take 1 Tab by mouth nightly. 30 Tab 0    allopurinoL (ZYLOPRIM) 100 mg tablet Take 200 mg by mouth daily as needed (When gout acts up).  simethicone (Gas-X Extra Strength) 125 mg chewable tablet Take 125 mg by mouth two (2) times daily as needed for Flatulence.  omega 3-DHA-EPA-fish oil 1,000 mg (120 mg-180 mg) capsule Take 1 Cap by mouth daily.  co-enzyme Q-10 (Co Q-10) 100 mg capsule Take 100 mg by mouth daily.  famotidine (Pepcid AC) 10 mg tablet Take 10 mg by mouth once as needed for Heartburn.  Januvia 100 mg tablet take 1 tablet by mouth IN THE MORNING 90 Tab 1    multivitamin (ONE A DAY) tablet Take 1 Tab by mouth daily.  pantoprazole (PROTONIX) 40 mg tablet Take 1 Tab by mouth Daily (before breakfast). (Patient not taking: Reported on 7/29/2021) 30 Tab 1    polyethylene glycol (MIRALAX) 17 gram packet Take 1 Packet by mouth daily.  (Patient not taking: Reported on 7/29/2021) 30 Packet 0    senna-docusate (Senna with Docusate Sodium) 8.6-50 mg per tablet Take 1 Tab by mouth daily. (Patient not taking: Reported on 7/29/2021) 30 Tab 0    vitamin M-O-C-lutein-minerals (OCUVITE) tablet Take 1 Tab by mouth two (2) times a day. (Patient not taking: Reported on 7/29/2021)      calcium carbonate (CLARA-SELTZER ANTACID PO) Take 1 Tab by mouth once as needed (Acid Reflux). (Patient not taking: Reported on 7/29/2021)      ketoconazole (NIZORAL) 2 % topical cream as needed. (Patient not taking: Reported on 7/29/2021)       No current facility-administered medications on file prior to visit. Allergies   Allergen Reactions    Shellfish Derived Other (comments)     \"causes my gout to act up, not an allergy\"       ROS   ROS per HPI and PMH      Objective  Physical Exam  Vitals reviewed. Cardiovascular:      Rate and Rhythm: Normal rate and regular rhythm. Heart sounds: Normal heart sounds. Pulmonary:      Effort: Pulmonary effort is normal.      Breath sounds: Normal breath sounds. Neurological:      Mental Status: She is alert and oriented to person, place, and time. Psychiatric:         Mood and Affect: Mood normal.         Behavior: Behavior normal.         Thought Content: Thought content normal.         Judgment: Judgment normal.          Assessment & Plan      ICD-10-CM ICD-9-CM    1. HTN (hypertension), benign  I10 401.1    2. Syncope, unspecified syncope type  R55 780.2    3. Type 2 diabetes mellitus with chronic kidney disease, without long-term current use of insulin, unspecified CKD stage (HCC)  E11.22 250.40      585.9    4. Hypothyroidism, unspecified type  E03.9 244.9    5. Hypercholesteremia  E78.00 272.0    6. Acute blood loss anemia  D62 285.1    7. Hyperlipidemia, unspecified hyperlipidemia type  E78.5 272.4      Diagnoses and all orders for this visit:    1. HTN (hypertension), benign  Patient's blood pressure is at goal at 118/80. Will continue with present regimen    2.  Syncope, unspecified syncope type  Patient has had no further occurrence of rectal bleeding that caused her syncope    3. Type 2 diabetes mellitus with chronic kidney disease, without long-term current use of insulin, unspecified CKD stage (HCC)  A1C is at goal at 6.2. Will maintain at current regimen    4. Hypothyroidism, unspecified type  TSH and T4 are WNL. Will maintain on current regimen    5. Hypercholesteremia  Lipid panel show elevated triglycerides and elevated bad cholesterol. I am going to add niacin for this condition    6. Acute blood loss anemia  H and H WNL. Will continue to monitor    7. Hyperlipidemia, unspecified hyperlipidemia type  Adding niacin    8. Insomnia  Will try the use of melatonin and patient will call in one week to let me know how it is working    Other orders  -     melatonin 3 mg tablet; Take 1 Tablet by mouth nightly. Follow-up and Dispositions    · Return in about 6 months (around 1/29/2022) for [address]  with .        Enrrique Harrington NP

## 2021-07-30 VITALS
WEIGHT: 159.4 LBS | OXYGEN SATURATION: 95 % | DIASTOLIC BLOOD PRESSURE: 80 MMHG | HEART RATE: 69 BPM | HEIGHT: 62 IN | SYSTOLIC BLOOD PRESSURE: 118 MMHG | RESPIRATION RATE: 14 BRPM | BODY MASS INDEX: 29.33 KG/M2 | TEMPERATURE: 97.3 F

## 2021-07-30 RX ORDER — MULTIVIT WITH IRON,MINERALS
100 TABLET ORAL EVERY EVENING
Qty: 90 TABLET | Refills: 1 | Status: SHIPPED | OUTPATIENT
Start: 2021-07-30

## 2021-07-30 NOTE — PROGRESS NOTES
Some of your kidney function are below baseline but this is not new and it has improved over the years. Please try to avoid the use of meds like Ibuprofen, advil and motrin as these tend to have an adverse effect on kidney function. We will continue to monitor to make sure that it remains stable. Yout triglycerides are elevated alsog with one of your bad cholesterols. I would like to add vitamin B to see if we can bring this down. Your A1C is very good at Your other labs are basically normal.  Some values may be minimally outside the  \"normal\" range but are not harmful or clinically significant. Please contact the office if you have questions or concerns.   We will recheck them at your next office visit

## 2021-08-02 RX ORDER — SITAGLIPTIN 100 MG/1
TABLET, FILM COATED ORAL
Qty: 90 TABLET | Refills: 1 | Status: SHIPPED | OUTPATIENT
Start: 2021-08-02 | End: 2022-01-30

## 2021-08-19 ENCOUNTER — TELEPHONE (OUTPATIENT)
Dept: FAMILY MEDICINE CLINIC | Age: 81
End: 2021-08-19

## 2021-08-19 NOTE — TELEPHONE ENCOUNTER
Pt called to let Lima Cook NP know the sleep medication that she was prescribed is working perfectly.

## 2021-09-09 RX ORDER — ALLOPURINOL 100 MG/1
TABLET ORAL
Qty: 180 TABLET | Refills: 1 | Status: SHIPPED | OUTPATIENT
Start: 2021-09-09

## 2021-09-09 RX ORDER — GLIPIZIDE 5 MG/1
TABLET ORAL
Qty: 180 TABLET | Refills: 1 | Status: SHIPPED | OUTPATIENT
Start: 2021-09-09

## 2021-10-25 RX ORDER — ALENDRONATE SODIUM 70 MG/1
TABLET ORAL
Qty: 12 TABLET | Refills: 1 | Status: SHIPPED | OUTPATIENT
Start: 2021-10-25

## 2021-11-01 RX ORDER — SIMVASTATIN 10 MG/1
TABLET, FILM COATED ORAL
Qty: 90 TABLET | Refills: 1 | Status: SHIPPED | OUTPATIENT
Start: 2021-11-01 | End: 2022-05-02

## 2021-12-15 ENCOUNTER — TELEPHONE (OUTPATIENT)
Dept: FAMILY MEDICINE CLINIC | Age: 81
End: 2021-12-15

## 2021-12-15 DIAGNOSIS — E11.22 TYPE 2 DIABETES MELLITUS WITH CHRONIC KIDNEY DISEASE, WITHOUT LONG-TERM CURRENT USE OF INSULIN, UNSPECIFIED CKD STAGE (HCC): Primary | ICD-10-CM

## 2021-12-15 RX ORDER — CALCIUM CITRATE/VITAMIN D3 200MG-6.25
TABLET ORAL
COMMUNITY
Start: 2021-09-07 | End: 2021-12-15 | Stop reason: SDUPTHER

## 2021-12-15 NOTE — TELEPHONE ENCOUNTER
Patient stated that the pharmacy will resend the request for the prescription for the test strips and that she only has 2 left.

## 2021-12-16 RX ORDER — CALCIUM CITRATE/VITAMIN D3 200MG-6.25
TABLET ORAL
Qty: 100 STRIP | Refills: 2 | Status: SHIPPED | OUTPATIENT
Start: 2021-12-16 | End: 2022-03-21 | Stop reason: SDUPTHER

## 2022-01-30 RX ORDER — SITAGLIPTIN 100 MG/1
TABLET, FILM COATED ORAL
Qty: 90 TABLET | Refills: 1 | Status: SHIPPED | OUTPATIENT
Start: 2022-01-30 | End: 2022-05-02

## 2022-03-11 ENCOUNTER — NURSE TRIAGE (OUTPATIENT)
Dept: OTHER | Facility: CLINIC | Age: 82
End: 2022-03-11

## 2022-03-11 NOTE — TELEPHONE ENCOUNTER
Received call from Luciana at [facility] with [employer] Complaint. Subjective: Caller states \"pain over my left eye off/on or temple\"     Current Symptoms: Currently has no pain but was in her left temple a few minutes ago. Saw eye doctor today to make sure her eyes weren't the cause, and they aren't. Has migraine history but this isn't typical. Denies nausea or vomiting. No change in vision. Denies stiff neck or neck pain. Gets a little dizzy when reaching above her head. Had chills 4 days but never checked her temperature. Onset: 1 months ago; intermittent    Associated Symptoms: NA    Pain Severity: 0/10; mild when it occurs; intermittent    Temperature: not feverish didn't checked    What has been tried: tylenol    LMP: NA Pregnant: NA    Recommended disposition: See PCP within 3 Days    Care advice provided, patient verbalizes understanding; denies any other questions or concerns; instructed to call back for any new or worsening symptoms. Patient/Caller agrees with recommended disposition; writer provided warm transfer to Kim Contreras at [facility] for appointment scheduling    Attention Provider: Thank you for allowing me to participate in the care of your patient. The patient was connected to triage in response to information provided to the [facility]. Please do not respond through this encounter as the response is not directed to a shared pool.       Reason for Disposition   [1] MILD-MODERATE headache AND [2] present > 72 hours    Protocols used: HEADACHE-ADULT-AH

## 2022-03-18 PROBLEM — R55 SYNCOPE: Status: ACTIVE | Noted: 2021-02-08

## 2022-03-18 PROBLEM — E78.00 HYPERCHOLESTEREMIA: Status: ACTIVE | Noted: 2017-04-04

## 2022-03-19 PROBLEM — K92.1 MELENA: Status: ACTIVE | Noted: 2021-02-08

## 2022-03-19 PROBLEM — E66.9 OBESITY (BMI 30.0-34.9): Status: ACTIVE | Noted: 2017-04-04

## 2022-03-19 PROBLEM — K57.90 DIVERTICULOSIS: Status: ACTIVE | Noted: 2021-02-10

## 2022-03-19 PROBLEM — K92.2 ACUTE GASTROINTESTINAL HEMORRHAGE: Status: ACTIVE | Noted: 2021-02-08

## 2022-03-19 PROBLEM — D62 ACUTE BLOOD LOSS ANEMIA: Status: ACTIVE | Noted: 2021-02-08

## 2022-03-19 PROBLEM — E03.9 HYPOTHYROID: Status: ACTIVE | Noted: 2017-04-04

## 2022-03-19 PROBLEM — I10 HTN (HYPERTENSION): Status: ACTIVE | Noted: 2017-04-04

## 2022-03-19 PROBLEM — E66.811 OBESITY (BMI 30.0-34.9): Status: ACTIVE | Noted: 2017-04-04

## 2022-03-20 PROBLEM — Z87.19 S/P LAPAROSCOPIC HERNIA REPAIR: Status: ACTIVE | Noted: 2017-04-13

## 2022-03-20 PROBLEM — K21.9 GERD (GASTROESOPHAGEAL REFLUX DISEASE): Status: ACTIVE | Noted: 2017-04-04

## 2022-03-20 PROBLEM — Z98.890 S/P LAPAROSCOPIC HERNIA REPAIR: Status: ACTIVE | Noted: 2017-04-13

## 2022-03-21 DIAGNOSIS — E11.22 TYPE 2 DIABETES MELLITUS WITH CHRONIC KIDNEY DISEASE, WITHOUT LONG-TERM CURRENT USE OF INSULIN, UNSPECIFIED CKD STAGE (HCC): ICD-10-CM

## 2022-03-21 RX ORDER — CALCIUM CITRATE/VITAMIN D3 200MG-6.25
TABLET ORAL
Qty: 100 STRIP | Refills: 3 | Status: SHIPPED | OUTPATIENT
Start: 2022-03-21 | End: 2022-10-14 | Stop reason: SDUPTHER

## 2022-03-24 ENCOUNTER — TELEPHONE (OUTPATIENT)
Dept: FAMILY MEDICINE CLINIC | Age: 82
End: 2022-03-24

## 2022-03-24 RX ORDER — LANCETS
EACH MISCELLANEOUS
Qty: 100 EACH | Refills: 1 | Status: SHIPPED | OUTPATIENT
Start: 2022-03-24 | End: 2022-10-14 | Stop reason: SDUPTHER

## 2022-03-24 NOTE — TELEPHONE ENCOUNTER
I have sent in the lancets. The paperwork for her diabetic shoes states that she has to be seen not more than 6 months before I fill out the paperwork.   Office is calling her to schedule an appointment

## 2022-03-24 NOTE — TELEPHONE ENCOUNTER
Patient stated that she needs a refill for lancets. She has been out for over 1 week. Pt also stated that she was checking to see if the form for her diabetic shoes has been signed. Since pt has not been seen since 07/2021 left message that she needs an appointment.

## 2022-03-29 ENCOUNTER — TELEPHONE (OUTPATIENT)
Dept: FAMILY MEDICINE CLINIC | Age: 82
End: 2022-03-29

## 2022-03-29 NOTE — TELEPHONE ENCOUNTER
Called pharmacy because script for lancets was sent to them on 3/24/22. Per pharmacist they need the [address] form filled out. Advised her to resend to out back fax and we would get filled out and sent back to them. Left message advising pt of all and advised her to contact us if she had any questions.

## 2022-03-29 NOTE — TELEPHONE ENCOUNTER
----- Message from Donnie Vidal sent at 3/28/2022  5:11 PM EDT -----  Subject: Message to Provider    QUESTIONS  Information for Provider? pt needs the lancets, needles for blood draw,   urgently   ---------------------------------------------------------------------------  --------------  CALL BACK INFO  What is the best way for the office to contact you? OK to leave message on   voicemail  Preferred Call Back Phone Number? [phone]  ---------------------------------------------------------------------------  --------------  SCRIPT ANSWERS  Relationship to Patient?  Self

## 2022-03-30 RX ORDER — LEVOTHYROXINE SODIUM 50 UG/1
TABLET ORAL
Qty: 90 TABLET | Refills: 1 | Status: SHIPPED | OUTPATIENT
Start: 2022-03-30 | End: 2022-06-20 | Stop reason: SDUPTHER

## 2022-04-13 DIAGNOSIS — E11.22 TYPE 2 DIABETES MELLITUS WITH CHRONIC KIDNEY DISEASE, WITHOUT LONG-TERM CURRENT USE OF INSULIN, UNSPECIFIED CKD STAGE (HCC): Primary | ICD-10-CM

## 2022-05-02 ENCOUNTER — OFFICE VISIT (OUTPATIENT)
Dept: FAMILY MEDICINE CLINIC | Age: 82
End: 2022-05-02
Payer: MEDICARE

## 2022-05-02 VITALS
WEIGHT: 156 LBS | RESPIRATION RATE: 16 BRPM | HEIGHT: 62 IN | SYSTOLIC BLOOD PRESSURE: 112 MMHG | HEART RATE: 75 BPM | TEMPERATURE: 97.2 F | BODY MASS INDEX: 28.71 KG/M2 | DIASTOLIC BLOOD PRESSURE: 80 MMHG | OXYGEN SATURATION: 98 %

## 2022-05-02 DIAGNOSIS — E03.9 HYPOTHYROIDISM, UNSPECIFIED TYPE: ICD-10-CM

## 2022-05-02 DIAGNOSIS — E11.22 TYPE 2 DIABETES MELLITUS WITH CHRONIC KIDNEY DISEASE, WITHOUT LONG-TERM CURRENT USE OF INSULIN, UNSPECIFIED CKD STAGE (HCC): ICD-10-CM

## 2022-05-02 DIAGNOSIS — E78.5 HYPERLIPIDEMIA, UNSPECIFIED HYPERLIPIDEMIA TYPE: ICD-10-CM

## 2022-05-02 DIAGNOSIS — R22.32 MASS OF ARM, LEFT: Primary | ICD-10-CM

## 2022-05-02 PROCEDURE — 1090F PRES/ABSN URINE INCON ASSESS: CPT | Performed by: NURSE PRACTITIONER

## 2022-05-02 PROCEDURE — G8432 DEP SCR NOT DOC, RNG: HCPCS | Performed by: NURSE PRACTITIONER

## 2022-05-02 PROCEDURE — G8399 PT W/DXA RESULTS DOCUMENT: HCPCS | Performed by: NURSE PRACTITIONER

## 2022-05-02 PROCEDURE — G8754 DIAS BP LESS 90: HCPCS | Performed by: NURSE PRACTITIONER

## 2022-05-02 PROCEDURE — G8419 CALC BMI OUT NRM PARAM NOF/U: HCPCS | Performed by: NURSE PRACTITIONER

## 2022-05-02 PROCEDURE — 1101F PT FALLS ASSESS-DOCD LE1/YR: CPT | Performed by: NURSE PRACTITIONER

## 2022-05-02 PROCEDURE — 99214 OFFICE O/P EST MOD 30 MIN: CPT | Performed by: NURSE PRACTITIONER

## 2022-05-02 PROCEDURE — G8427 DOCREV CUR MEDS BY ELIG CLIN: HCPCS | Performed by: NURSE PRACTITIONER

## 2022-05-02 PROCEDURE — 3044F HG A1C LEVEL LT 7.0%: CPT | Performed by: NURSE PRACTITIONER

## 2022-05-02 PROCEDURE — G8752 SYS BP LESS 140: HCPCS | Performed by: NURSE PRACTITIONER

## 2022-05-02 PROCEDURE — G8536 NO DOC ELDER MAL SCRN: HCPCS | Performed by: NURSE PRACTITIONER

## 2022-05-02 RX ORDER — ATENOLOL 25 MG/1
25 TABLET ORAL DAILY
Qty: 90 TABLET | Refills: 1 | Status: SHIPPED | OUTPATIENT
Start: 2022-05-02 | End: 2022-10-24

## 2022-05-02 RX ORDER — PATIROMER 8.4 G/1
POWDER, FOR SUSPENSION ORAL
COMMUNITY
Start: 2022-04-25

## 2022-05-02 RX ORDER — MELATONIN 10 MG
10 CAPSULE ORAL
Qty: 90 CAPSULE | Refills: 1 | Status: SHIPPED | OUTPATIENT
Start: 2022-05-02

## 2022-05-02 RX ORDER — LIDOCAINE 50 MG/G
PATCH TOPICAL
COMMUNITY
Start: 2022-04-04

## 2022-05-02 RX ORDER — SIMVASTATIN 10 MG/1
TABLET, FILM COATED ORAL
Qty: 90 TABLET | Refills: 1 | Status: SHIPPED | OUTPATIENT
Start: 2022-05-02 | End: 2022-06-20 | Stop reason: SDUPTHER

## 2022-05-02 NOTE — PROGRESS NOTES
Chief Complaint   Patient presents with    Diabetes    Follow Up Chronic Condition     htn, diabetes     1. Have you been to the ER, urgent care clinic since your last visit? Hospitalized since your last visit? No    2. Have you seen or consulted any other health care providers outside of the [address] [place] [address] since your last visit? Include any pap smears or colon screening.  No     Visit Vitals  /80 (BP 1 Location: Left upper arm, BP Patient Position: Sitting, BP Cuff Size: Adult)   Pulse 75   Temp 97.2 °F (36.2 °C) (Temporal)   Resp 16   Ht 5' 2\" (1.575 m)   Wt 156 lb (70.8 kg)   SpO2 98%   BMI 28.53 kg/m²     3 most recent PHQ Screens 5/2/2022   Little interest or pleasure in doing things Not at all   Feeling down, depressed, irritable, or hopeless Not at all   Total Score PHQ 2 0       n

## 2022-05-02 NOTE — PROGRESS NOTES
Subjective  Chief Complaint   Patient presents with    Diabetes    Follow Up Chronic Condition     htn, diabetes     HPI:  Balaji Freed is a 78 y.o. female. 79 yo female presents for f/u of her chronic conditions which include anxiety, chronic back pain, diabetes, HTN, hypercholesterolemia and hypothyroidism. She is adherent with her medication regimen with assistance from her family. She has a current complaint of mass in her left antecubital space she does not know how long it has been there and it is not painful. Past Medical History:   Diagnosis Date    Anxiety     Cancer [facility])     Skin of face.  Cataracts, bilateral     Chronic back pain     x1 yr;seeing Chiropractor for a year    Diabetes ([address]     GERD (gastroesophageal reflux disease)     Gout     Hypercholesterolemia     Hypertension     Hypothyroidism     Migraine     Shingles      Family History   Problem Relation Age of Onset    Diabetes Mother     Alzheimer's Disease Mother     Other Mother         Poliosis    Heart Attack Father      Social History     Socioeconomic History    Marital status:      Spouse name: Not on file    Number of children: Not on file    Years of education: Not on file    Highest education level: Not on file   Occupational History    Not on file   Tobacco Use    Smoking status: Never Smoker    Smokeless tobacco: Never Used   Substance and Sexual Activity    Alcohol use: No    Drug use: No    Sexual activity: Not on file   Other Topics Concern    Not on file   Social History Narrative    ** Merged History Encounter **          Social Determinants of Health     Financial Resource Strain:     Difficulty of Paying Living Expenses: Not on file   Food Insecurity:     Worried About Running Out of Food in the Last Year: Not on file    Toni of Food in the Last Year: Not on file   Transportation Needs:     Lack of Transportation (Medical):  Not on file    Lack of Transportation (Non-Medical): Not on file   Physical Activity:     Days of Exercise per Week: Not on file    Minutes of Exercise per Session: Not on file   Stress:     Feeling of Stress : Not on file   Social Connections:     Frequency of Communication with Friends and Family: Not on file    Frequency of Social Gatherings with Friends and Family: Not on file    Attends Nondenominational Services: Not on file    Active Member of [address] [place] [address] or Organizations: Not on file    Attends Club or Organization Meetings: Not on file    Marital Status: Not on file   Intimate Partner Violence:     Fear of Current or Ex-Partner: Not on file    Emotionally Abused: Not on file    Physically Abused: Not on file    Sexually Abused: Not on file   Housing Stability:     Unable to Pay for Housing in the Last Year: Not on file    Number of [place] in the Last Year: Not on file    Unstable Housing in the Last Year: Not on file     Current Outpatient Medications on File Prior to Visit   Medication Sig Dispense Refill    Veltassa 8.4 gram powder MIX 1 packet and take as directed by mouth daily      lidocaine (LIDODERM) 5 % apply to affected area every 12 hours if needed      levothyroxine (SYNTHROID) 50 mcg tablet take 1 tablet by mouth daily before breakfast 90 Tablet 1    lancets misc Use one lancet to check blood sugar once a day 100 Each 1    True Metrix Glucose Test Strip strip use 1 TEST STRIP to TEST BLOOD SUGAR once daily 100 Strip 3    alendronate (FOSAMAX) 70 mg tablet take 1 tablet by mouth every week 12 Tablet 1    allopurinoL (ZYLOPRIM) 100 mg tablet take 2 tablets by mouth once daily 180 Tablet 1    glipiZIDE (GLUCOTROL) 5 mg tablet take 1 tablet by mouth twice a day 180 Tablet 1    nicotinic acid (NIACIN) 100 mg tablet Take 1 Tablet by mouth every evening. 90 Tablet 1    aspirin delayed-release 81 mg tablet Take 1 Tab by mouth nightly.  30 Tab 0    omega 3-DHA-EPA-fish oil 1,000 mg (120 mg-180 mg) capsule Take 1 Cap by mouth daily.  co-enzyme Q-10 (Co Q-10) 100 mg capsule Take 100 mg by mouth daily.  famotidine (Pepcid AC) 10 mg tablet Take 10 mg by mouth once as needed for Heartburn.  multivitamin (ONE A DAY) tablet Take 1 Tab by mouth daily.  simvastatin (ZOCOR) 10 mg tablet take 1 tablet by mouth at bedtime  (DISCONTINUE 20MG DOSE) 90 Tablet 1     No current facility-administered medications on file prior to visit. Allergies   Allergen Reactions    Shellfish Derived Other (comments)     \"causes my gout to act up, not an allergy\"     ROS      Objective  Physical Exam     Assessment & Plan      ICD-10-CM ICD-9-CM    1. Mass of arm, left  R22.32 782.2 US EXT NONVAS LT COMP      US EXT NONVAS LT COMP   2. Type 2 diabetes mellitus with chronic kidney disease, without long-term current use of insulin, unspecified CKD stage (HCC)  E11.22 250.40 HEMOGLOBIN A1C WITH EAG     585.9 MICROALBUMIN, UR, RAND W/ MICROALB/CREAT RATIO      CBC WITH AUTOMATED DIFF      METABOLIC PANEL, COMPREHENSIVE   3. Hyperlipidemia, unspecified hyperlipidemia type  E78.5 272.4 LIPID PANEL   4. Hypothyroidism, unspecified type  E03.9 244.9 TSH 3RD GENERATION      T4, FREE     Diagnoses and all orders for this visit:    1. Mass of arm, left  -     US EXT NONVAS LT COMP; Future  Radiological studies for additional clinical information and will make treatment decisions when I get the results    2. Type 2 diabetes mellitus with chronic kidney disease, without long-term current use of insulin, unspecified CKD stage (HCC)  -     HEMOGLOBIN A1C WITH EAG  -     MICROALBUMIN, UR, RAND W/ MICROALB/CREAT RATIO  -     CBC WITH AUTOMATED DIFF  -     METABOLIC PANEL, COMPREHENSIVE  Obtaining updated A1C, Microalbumin, CBC and CMP for trending and will make treatment decisions when I get the results        3.  Hyperlipidemia, unspecified hyperlipidemia type  -     LIPID PANEL  Obtaining updated Lipid panel for trending and will make treatment decisions when I get the results        4. Hypothyroidism, unspecified type  -     TSH 3RD GENERATION  -     T4, FREE  Obtaining updated TSH and T4 for trending and will make treatment decisions when I get the results        Other orders  -     atenoloL (TENORMIN) 25 mg tablet; Take 1 Tablet by mouth daily.  -     SITagliptin (JANUVIA) 50 mg tablet; Take 1 Tablet by mouth daily. -     melatonin 10 mg capsule; Take 1 Capsule by mouth nightly. Follow-up and Dispositions    · Return in about 6 months (around 11/2/2022) for [facility] with physical and fasting labs.        Catalina Lopez NP

## 2022-05-06 LAB
ALBUMIN SERPL-MCNC: 4.1 G/DL (ref 3.6–4.6)
ALBUMIN/CREAT UR: 66 MG/G CREAT (ref 0–29)
ALBUMIN/GLOB SERPL: 1.8 {RATIO} (ref 1.2–2.2)
ALP SERPL-CCNC: 67 IU/L (ref 44–121)
ALT SERPL-CCNC: 19 IU/L (ref 0–32)
AST SERPL-CCNC: 18 IU/L (ref 0–40)
BASOPHILS # BLD AUTO: 0 X10E3/UL (ref 0–0.2)
BASOPHILS NFR BLD AUTO: 1 %
BILIRUB SERPL-MCNC: 0.2 MG/DL (ref 0–1.2)
BUN SERPL-MCNC: 26 MG/DL (ref 8–27)
BUN/CREAT SERPL: 18 (ref 12–28)
CALCIUM SERPL-MCNC: 9.8 MG/DL (ref 8.7–10.3)
CHLORIDE SERPL-SCNC: 104 MMOL/L (ref 96–106)
CHOLEST SERPL-MCNC: 138 MG/DL (ref 100–199)
CO2 SERPL-SCNC: 21 MMOL/L (ref 20–29)
CREAT SERPL-MCNC: 1.41 MG/DL (ref 0.57–1)
CREAT UR-MCNC: 32.5 MG/DL
EGFR: 37 ML/MIN/1.73
EOSINOPHIL # BLD AUTO: 0.4 X10E3/UL (ref 0–0.4)
EOSINOPHIL NFR BLD AUTO: 6 %
ERYTHROCYTE [DISTWIDTH] IN BLOOD BY AUTOMATED COUNT: 12.8 % (ref 11.7–15.4)
EST. AVERAGE GLUCOSE BLD GHB EST-MCNC: 131 MG/DL
GLOBULIN SER CALC-MCNC: 2.3 G/DL (ref 1.5–4.5)
GLUCOSE SERPL-MCNC: 89 MG/DL (ref 65–99)
HBA1C MFR BLD: 6.2 % (ref 4.8–5.6)
HCT VFR BLD AUTO: 36.9 % (ref 34–46.6)
HDLC SERPL-MCNC: 42 MG/DL
HGB BLD-MCNC: 12.3 G/DL (ref 11.1–15.9)
IMM GRANULOCYTES # BLD AUTO: 0 X10E3/UL (ref 0–0.1)
IMM GRANULOCYTES NFR BLD AUTO: 0 %
LDLC SERPL CALC-MCNC: 62 MG/DL (ref 0–99)
LYMPHOCYTES # BLD AUTO: 2.6 X10E3/UL (ref 0.7–3.1)
LYMPHOCYTES NFR BLD AUTO: 34 %
MCH RBC QN AUTO: 32.3 PG (ref 26.6–33)
MCHC RBC AUTO-ENTMCNC: 33.3 G/DL (ref 31.5–35.7)
MCV RBC AUTO: 97 FL (ref 79–97)
MICROALBUMIN UR-MCNC: 21.4 UG/ML
MONOCYTES # BLD AUTO: 1 X10E3/UL (ref 0.1–0.9)
MONOCYTES NFR BLD AUTO: 14 %
NEUTROPHILS # BLD AUTO: 3.5 X10E3/UL (ref 1.4–7)
NEUTROPHILS NFR BLD AUTO: 45 %
PLATELET # BLD AUTO: 208 X10E3/UL (ref 150–450)
POTASSIUM SERPL-SCNC: 4.6 MMOL/L (ref 3.5–5.2)
PROT SERPL-MCNC: 6.4 G/DL (ref 6–8.5)
RBC # BLD AUTO: 3.81 X10E6/UL (ref 3.77–5.28)
SODIUM SERPL-SCNC: 143 MMOL/L (ref 134–144)
T4 FREE SERPL-MCNC: 1.08 NG/DL (ref 0.82–1.77)
TRIGL SERPL-MCNC: 206 MG/DL (ref 0–149)
TSH SERPL DL<=0.005 MIU/L-ACNC: 6 UIU/ML (ref 0.45–4.5)
VLDLC SERPL CALC-MCNC: 34 MG/DL (ref 5–40)
WBC # BLD AUTO: 7.6 X10E3/UL (ref 3.4–10.8)

## 2022-05-13 NOTE — PROGRESS NOTES
Your A1C is stable at  so there will be no changes in your diabetic meds. Your urine test for damage to your kidneys is moderate so we will just monitor. Some of your kidney labs are abnormal and this is not a new episode. Have you ever seen a kidney specialist?  Your triglycerides remain elevated but better than they were 9 months ago. Please continue with your simvastatin. Your TSH is slightly elevated but the other thyroid lab is normal.  Since you are not having any symptoms I will not be making any changes to your thyroid med. Your other labs are basically normal.  Some values may be minimally outside the  \"normal\" range but are not harmful or clinically significant. Please contact the office if you have questions or concerns.   We will recheck all your labs at your next office visit

## 2022-05-17 ENCOUNTER — TELEPHONE (OUTPATIENT)
Dept: FAMILY MEDICINE CLINIC | Age: 82
End: 2022-05-17

## 2022-05-17 NOTE — PROGRESS NOTES
Results reviewed with pt. She stated that she sees the nephrologist that is in [place] when she has to be seen by them.

## 2022-06-06 ENCOUNTER — HOSPITAL ENCOUNTER (OUTPATIENT)
Dept: ULTRASOUND IMAGING | Age: 82
Discharge: HOME | End: 2022-06-06
Payer: MEDICARE

## 2022-06-06 PROCEDURE — 76881 US COMPL JOINT R-T W/IMG: CPT

## 2022-06-10 ENCOUNTER — APPOINTMENT (OUTPATIENT)
Dept: CT IMAGING | Age: 82
End: 2022-06-10
Attending: EMERGENCY MEDICINE
Payer: MEDICARE

## 2022-06-10 ENCOUNTER — APPOINTMENT (OUTPATIENT)
Dept: GENERAL RADIOLOGY | Age: 82
End: 2022-06-10
Attending: EMERGENCY MEDICINE
Payer: MEDICARE

## 2022-06-10 ENCOUNTER — NURSE TRIAGE (OUTPATIENT)
Dept: OTHER | Facility: CLINIC | Age: 82
End: 2022-06-10

## 2022-06-10 ENCOUNTER — HOSPITAL ENCOUNTER (EMERGENCY)
Age: 82
Discharge: HOME | End: 2022-06-10
Attending: EMERGENCY MEDICINE
Payer: MEDICARE

## 2022-06-10 VITALS
WEIGHT: 156 LBS | SYSTOLIC BLOOD PRESSURE: 133 MMHG | RESPIRATION RATE: 16 BRPM | BODY MASS INDEX: 28.71 KG/M2 | HEIGHT: 62 IN | OXYGEN SATURATION: 90 % | DIASTOLIC BLOOD PRESSURE: 65 MMHG | TEMPERATURE: 98.1 F | HEART RATE: 72 BPM

## 2022-06-10 DIAGNOSIS — K57.32 SIGMOID DIVERTICULITIS: Primary | ICD-10-CM

## 2022-06-10 DIAGNOSIS — R53.1 GENERALIZED WEAKNESS: ICD-10-CM

## 2022-06-10 LAB
ALBUMIN SERPL-MCNC: 3.6 G/DL (ref 3.5–5.2)
ALBUMIN/GLOB SERPL: 1.3 {RATIO} (ref 1.1–2.2)
ALP SERPL-CCNC: 71 U/L (ref 35–104)
ALT SERPL-CCNC: 20 U/L (ref 10–35)
ANION GAP SERPL CALC-SCNC: 9 MMOL/L (ref 5–15)
APPEARANCE UR: CLEAR
AST SERPL-CCNC: 21 U/L (ref 10–35)
BACTERIA URNS QL MICRO: NEGATIVE /HPF
BASOPHILS # BLD: 0.1 K/UL (ref 0–0.1)
BASOPHILS NFR BLD: 1 % (ref 0–1)
BILIRUB SERPL-MCNC: <0.2 MG/DL (ref 0.2–1)
BILIRUB UR QL: NEGATIVE
BUN SERPL-MCNC: 26 MG/DL (ref 8–23)
BUN/CREAT SERPL: 20 (ref 12–20)
CALCIUM SERPL-MCNC: 9.5 MG/DL (ref 8.8–10.2)
CHLORIDE SERPL-SCNC: 104 MMOL/L (ref 98–107)
CO2 SERPL-SCNC: 27 MMOL/L (ref 22–29)
COLOR UR: ABNORMAL
COVID-19 RAPID TEST, COVR: NOT DETECTED
CREAT SERPL-MCNC: 1.3 MG/DL (ref 0.5–0.9)
DIFFERENTIAL METHOD BLD: ABNORMAL
EOSINOPHIL # BLD: 0.4 K/UL (ref 0–0.4)
EOSINOPHIL NFR BLD: 6 % (ref 0–7)
EPITH CASTS URNS QL MICRO: ABNORMAL /LPF
ERYTHROCYTE [DISTWIDTH] IN BLOOD BY AUTOMATED COUNT: 12.9 % (ref 11.5–14.5)
FLUAV AG NPH QL IA: NEGATIVE
FLUBV AG NOSE QL IA: NEGATIVE
GLOBULIN SER CALC-MCNC: 2.7 G/DL (ref 2–4)
GLUCOSE SERPL-MCNC: 119 MG/DL (ref 65–100)
GLUCOSE UR STRIP.AUTO-MCNC: NEGATIVE MG/DL
HCT VFR BLD AUTO: 35.9 % (ref 35–47)
HGB BLD-MCNC: 11.8 G/DL (ref 11.5–16)
HGB UR QL STRIP: NEGATIVE
HYALINE CASTS URNS QL MICRO: ABNORMAL /LPF
IMM GRANULOCYTES # BLD AUTO: 0 K/UL (ref 0–0.04)
IMM GRANULOCYTES NFR BLD AUTO: 0 % (ref 0–0.5)
KETONES UR QL STRIP.AUTO: NEGATIVE MG/DL
LACTATE SERPL-SCNC: 0.7 MMOL/L (ref 0.4–2)
LEUKOCYTE ESTERASE UR QL STRIP.AUTO: NEGATIVE
LIPASE SERPL-CCNC: 28 U/L (ref 13–60)
LYMPHOCYTES # BLD: 1.6 K/UL (ref 0.8–3.5)
LYMPHOCYTES NFR BLD: 21 % (ref 12–49)
MAGNESIUM SERPL-MCNC: 1.8 MG/DL (ref 1.6–2.4)
MCH RBC QN AUTO: 31.6 PG (ref 26–34)
MCHC RBC AUTO-ENTMCNC: 32.9 G/DL (ref 30–36.5)
MCV RBC AUTO: 96.2 FL (ref 80–99)
MONOCYTES # BLD: 1 K/UL (ref 0–1)
MONOCYTES NFR BLD: 14 % (ref 5–13)
NEUTS SEG # BLD: 4.6 K/UL (ref 1.8–8)
NEUTS SEG NFR BLD: 59 % (ref 32–75)
NITRITE UR QL STRIP.AUTO: NEGATIVE
NRBC # BLD: 0 K/UL (ref 0–0.01)
NRBC BLD-RTO: 0 PER 100 WBC
PH UR STRIP: 6 [PH] (ref 5–8)
PLATELET # BLD AUTO: 223 K/UL (ref 150–400)
PMV BLD AUTO: 9.7 FL (ref 8.9–12.9)
POTASSIUM SERPL-SCNC: 5.1 MMOL/L (ref 3.5–5.1)
PROT SERPL-MCNC: 6.3 G/DL (ref 6.4–8.3)
PROT UR STRIP-MCNC: NEGATIVE MG/DL
RBC # BLD AUTO: 3.73 M/UL (ref 3.8–5.2)
RBC #/AREA URNS HPF: ABNORMAL /HPF
SODIUM SERPL-SCNC: 140 MMOL/L (ref 136–145)
SOURCE, COVRS: NORMAL
SP GR UR REFRACTOMETRY: 1.01 (ref 1–1.03)
TROPONIN I BLD-MCNC: <0.04 NG/ML (ref 0–0.08)
UR CULT HOLD, URHOLD: NORMAL
UROBILINOGEN UR QL STRIP.AUTO: 0.2 EU/DL
WBC # BLD AUTO: 7.7 K/UL (ref 3.6–11)
WBC URNS QL MICRO: ABNORMAL /HPF (ref 0–4)

## 2022-06-10 PROCEDURE — 87804 INFLUENZA ASSAY W/OPTIC: CPT

## 2022-06-10 PROCEDURE — 74011250636 HC RX REV CODE- 250/636: Performed by: EMERGENCY MEDICINE

## 2022-06-10 PROCEDURE — 83735 ASSAY OF MAGNESIUM: CPT

## 2022-06-10 PROCEDURE — 87635 SARS-COV-2 COVID-19 AMP PRB: CPT

## 2022-06-10 PROCEDURE — 83605 ASSAY OF LACTIC ACID: CPT

## 2022-06-10 PROCEDURE — 99285 EMERGENCY DEPT VISIT HI MDM: CPT

## 2022-06-10 PROCEDURE — 70450 CT HEAD/BRAIN W/O DYE: CPT

## 2022-06-10 PROCEDURE — 81001 URINALYSIS AUTO W/SCOPE: CPT

## 2022-06-10 PROCEDURE — 80053 COMPREHEN METABOLIC PANEL: CPT

## 2022-06-10 PROCEDURE — 74011250637 HC RX REV CODE- 250/637: Performed by: EMERGENCY MEDICINE

## 2022-06-10 PROCEDURE — 83690 ASSAY OF LIPASE: CPT

## 2022-06-10 PROCEDURE — 84484 ASSAY OF TROPONIN QUANT: CPT

## 2022-06-10 PROCEDURE — 74176 CT ABD & PELVIS W/O CONTRAST: CPT

## 2022-06-10 PROCEDURE — 36415 COLL VENOUS BLD VENIPUNCTURE: CPT

## 2022-06-10 PROCEDURE — 71045 X-RAY EXAM CHEST 1 VIEW: CPT

## 2022-06-10 PROCEDURE — 85025 COMPLETE CBC W/AUTO DIFF WBC: CPT

## 2022-06-10 PROCEDURE — 93005 ELECTROCARDIOGRAM TRACING: CPT

## 2022-06-10 RX ORDER — AMOXICILLIN AND CLAVULANATE POTASSIUM 875; 125 MG/1; MG/1
1 TABLET, FILM COATED ORAL 2 TIMES DAILY
Qty: 14 TABLET | Refills: 0 | Status: SHIPPED | OUTPATIENT
Start: 2022-06-10 | End: 2022-06-17

## 2022-06-10 RX ORDER — ACETAMINOPHEN 325 MG/1
650 TABLET ORAL ONCE
Status: COMPLETED | OUTPATIENT
Start: 2022-06-10 | End: 2022-06-10

## 2022-06-10 RX ORDER — AMOXICILLIN AND CLAVULANATE POTASSIUM 875; 125 MG/1; MG/1
1 TABLET, FILM COATED ORAL
Status: COMPLETED | OUTPATIENT
Start: 2022-06-10 | End: 2022-06-10

## 2022-06-10 RX ADMIN — AMOXICILLIN AND CLAVULANATE POTASSIUM 1 TABLET: 875; 125 TABLET, FILM COATED ORAL at 20:39

## 2022-06-10 RX ADMIN — ACETAMINOPHEN 650 MG: 325 TABLET ORAL at 19:25

## 2022-06-10 RX ADMIN — SODIUM CHLORIDE 1000 ML: 9 INJECTION, SOLUTION INTRAVENOUS at 19:25

## 2022-06-10 NOTE — ED TRIAGE NOTES
Pt arrives via [facility] with cc of generalized weakness. Patient lives in private residence with son. Patient ambulatory at baseline. Patient ambulatory from EMS stretcher to ER stretcher upon arrival.    Patient denies current pain upon arrival.    Patient reports intermittent abdominal discomfort and HA since waking this AM. Patient reports BLE feel \"woobly\" when walking. Pt has hx of DM2,  per EMS. Clear speech.

## 2022-06-10 NOTE — ED PROVIDER NOTES
57-year-old female with a history as below presents to the emergency department noting a 1 day history of \"not feeling well. \"  She states that she feels tired and has taken multiple naps today. She notes some intermittent headache and intermittent abdominal pains that she initially thought was gas associated. She states that she feels \"wobbly\" on her feet when walking but denies any falls. Denies any fever, chills, cough, URI symptoms. She denies any dysuria, hematuria. She notes chronic lower back pain. She has not taken anything for her symptoms. She denies any known sick contacts or recent travel. She is vaccinated against COVID-19. Past Medical History:   Diagnosis Date    Anxiety     Cancer [facility])     Skin of face.  Cataracts, bilateral     Chronic back pain     x1 yr;seeing Chiropractor for a year    Diabetes ([facility] [address]     GERD (gastroesophageal reflux disease)     Gout     Hypercholesterolemia     Hypertension     Hypothyroidism     Migraine     Shingles        Past Surgical History:   Procedure Laterality Date    COLONOSCOPY N/A 2/9/2021    COLONOSCOPY performed by Alexandr Soriano MD at [facility] HX CATARACT REMOVAL Bilateral 05/2017    HX CHOLECYSTECTOMY      Open cholecystectomy.  HX COLONOSCOPY  06/07/2012    HX COLONOSCOPY  05/30/2010    HX DILATION AND CURETTAGE      HX DILATION AND CURETTAGE      HX ENDOSCOPY  06/07/2012    Ulcers in Antrum     HX HERNIA REPAIR      Umbilical herniorrhaphy with mesh.     HX OTHER SURGICAL  02/2021    blood transfusion    HX TONSILLECTOMY      HX TONSILLECTOMY           Family History:   Problem Relation Age of Onset    Diabetes Mother     Alzheimer's Disease Mother     Other Mother         Poliosis    Heart Attack Father        Social History     Socioeconomic History    Marital status:      Spouse name: Not on file    Number of children: Not on file    Years of education: Not on file    Highest education level: Not on file   Occupational History    Not on file   Tobacco Use    Smoking status: Never Smoker    Smokeless tobacco: Never Used   Substance and Sexual Activity    Alcohol use: No    Drug use: No    Sexual activity: Not on file   Other Topics Concern    Not on file   Social History Narrative    ** Merged History Encounter **          Social Determinants of Health     Financial Resource Strain:     Difficulty of Paying Living Expenses: Not on file   Food Insecurity:     Worried About Running Out of Food in the Last Year: Not on file    Toni of Food in the Last Year: Not on file   Transportation Needs:     Lack of Transportation (Medical): Not on file    Lack of Transportation (Non-Medical): Not on file   Physical Activity:     Days of Exercise per Week: Not on file    Minutes of Exercise per Session: Not on file   Stress:     Feeling of Stress : Not on file   Social Connections:     Frequency of Communication with Friends and Family: Not on file    Frequency of Social Gatherings with Friends and Family: Not on file    Attends Congregation Services: Not on file    Active Member of [address] [place] [address] or Organizations: Not on file    Attends Club or Organization Meetings: Not on file    Marital Status: Not on file   Intimate Partner Violence:     Fear of Current or Ex-Partner: Not on file    Emotionally Abused: Not on file    Physically Abused: Not on file    Sexually Abused: Not on file   Housing Stability:     Unable to Pay for Housing in the Last Year: Not on file    Number of [place] in the Last Year: Not on file    Unstable Housing in the Last Year: Not on file         ALLERGIES: Shellfish derived    Review of Systems   Constitutional: Positive for activity change and fatigue. Negative for appetite change, chills and fever. HENT: Negative for congestion, rhinorrhea, sinus pressure, sneezing and sore throat. Eyes: Negative for photophobia and visual disturbance.    Respiratory: Negative for cough and shortness of breath. Cardiovascular: Negative for chest pain. Gastrointestinal: Positive for abdominal pain. Negative for blood in stool, constipation, diarrhea, nausea and vomiting. Genitourinary: Negative for difficulty urinating, dysuria, flank pain, frequency, hematuria, menstrual problem, urgency, vaginal bleeding and vaginal discharge. Musculoskeletal: Positive for back pain (chronic). Negative for arthralgias, myalgias and neck pain. Skin: Negative for rash and wound. Neurological: Positive for light-headedness and headaches. Negative for syncope, weakness and numbness. Psychiatric/Behavioral: Negative for self-injury and suicidal ideas. All other systems reviewed and are negative. Vitals:    06/10/22 1808   BP: 139/75   Pulse: 72   Resp: 16   Temp: 98.1 °F (36.7 °C)   SpO2: 98%   Weight: 70.8 kg (156 lb)   Height: 5' 2\" (1.575 m)            Physical Exam  Vitals and nursing note reviewed. Constitutional:       General: She is not in acute distress. Appearance: Normal appearance. She is well-developed. She is obese. She is not diaphoretic. Comments: Elderly, frail appearing   HENT:      Head: Normocephalic and atraumatic. Nose: Nose normal.   Eyes:      Extraocular Movements: Extraocular movements intact. Conjunctiva/sclera: Conjunctivae normal.      Pupils: Pupils are equal, round, and reactive to light. Cardiovascular:      Rate and Rhythm: Normal rate and regular rhythm. Heart sounds: Normal heart sounds. Pulmonary:      Effort: Pulmonary effort is normal.      Breath sounds: Normal breath sounds. Abdominal:      General: There is no distension. Palpations: Abdomen is soft. Tenderness: There is generalized abdominal tenderness. There is no right CVA tenderness, left CVA tenderness, guarding or rebound. Musculoskeletal:         General: No tenderness. Cervical back: Normal range of motion and neck supple. Right lower leg: No edema. Left lower leg: No edema. Skin:     General: Skin is warm and dry. Coloration: Skin is pale. Neurological:      General: No focal deficit present. Mental Status: She is alert and oriented to person, place, and time. Cranial Nerves: No cranial nerve deficit. Sensory: No sensory deficit. Motor: No weakness. Coordination: Coordination normal.      Comments: Intact sensation, 5/5 strength in all 4 extremities, intact finger to nose, neg pronator drift, fluent speech, CN intact. [facility]  ED Course as of 06/10/22 1955   Fri Shad 10, 2022   1951 EKG shows normal sinus rhythm with a rate of 71 and QTC of 425. No concerning ST elevations or depressions noted. [MM]      ED Course User Index  [MM] Frank Douglas MD   78-year-old female presents with fatigue, generalized abdominal pain, possibly gas pain. She is afebrile with vital signs stable no acute distress. CXR viewed by myself and read by radiology showing no acute abnormalities. Labs, urine, CT scans ordered to further evaluate. While awaiting these results her care was signed out to Dr. Pepe Rosales at 7 PM.  Please see his note for further information regarding remainder of her care while in the ED. Please note that this dictation was completed with [employer], the computer voice recognition software. Quite often unanticipated grammatical, syntax, homophones, and other interpretive errors are inadvertently transcribed by the computer software. Please disregard these errors. Please excuse any errors that have escaped final proofreading.     Procedures

## 2022-06-10 NOTE — TELEPHONE ENCOUNTER
Received call from [facility] at [facility] with Red Flag Complaint. Subjective: Caller states \"She has been feeling weak and having headaches. I just do not feel right. BS is 132 range. I just do not feel good. My head feels heavy, dull pain in my head and a sharp pain in my head. I feel unsteady on my feet. \"     Current Symptoms: increased flatulence, extreme fatigue. Weakness and feels uneasy on her feet. Sharp pains in head over right eye and left eye and in back of head. Onset: 1 week ago; gradual    Associated Symptoms: reduced activity, NA    Pain Severity: 7/10; sharp; intermittent    Temperature: patient denies by unknown method    What has been tried: resting     LMP: NA Pregnant: NA    Recommended disposition: Go to Office Now    Care advice provided, patient verbalizes understanding; denies any other questions or concerns; instructed to call back for any new or worsening symptoms. Patient/Caller agrees with recommended disposition; writer provided warm transfer to [employer] at [facility] for appointment scheduling    Attention Provider: Thank you for allowing me to participate in the care of your patient. The patient was connected to triage in response to information provided to the [facility]. Please do not respond through this encounter as the response is not directed to a shared pool.       Reason for Disposition   MODERATE weakness (i.e., interferes with work, school, normal activities) and cause unknown (Exceptions: weakness with acute minor illness, or weakness from poor fluid intake)    Protocols used: WEAKNESS (GENERALIZED) AND FATIGUE-ADULT-OH

## 2022-06-11 NOTE — ED NOTES
Pt given discharge instructions, patient education, 1 prescription, and follow up information. Pt verbalizes understanding. All questions answered. Pt discharged to home in private vehicle, ambulatory. Pt A/Ox4, RA, pain controlled.

## 2022-06-11 NOTE — DISCHARGE INSTRUCTIONS
Return to the emergency department if your symptoms are worsening, if you have severe abdominal pain, cannot eat or drink, develop persistent fevers that do not improve with Tylenol, or any other symptoms you find concerning. In the meantime please take the antibiotics as prescribed and follow-up with your primary care doctor soon as possible.

## 2022-06-11 NOTE — ED PROVIDER NOTES
69-year-old female presented due to a day of not feeling well as well as generalized weakness and some abdominal pain. .  Patient signed out to me by Dr. Melva Parikh pending results of her lab work. CT shows uncomplicated sigmoid diverticulitis. Her labs are reassuring. Creatinine is at baseline. Troponin is normal.  Urinalysis is clear. Lactate normal.  Patient endorses improvement in her symptoms. She is hemodynamically stable and overall appears well on my reexamination. She was given her first dose of Augmentin here and will be sent a prescription to her pharmacy. She was instructed to follow-up with her primary care doctor soon as possible and encouraged to return with any concerns. Patient discharged in stable condition.

## 2022-06-11 NOTE — ED NOTES
Pt resting in position of comfort in locked and lowered bed with bilateral eyes closed and symmetrical chest rise.  Side rails x 2

## 2022-06-13 ENCOUNTER — TELEPHONE (OUTPATIENT)
Dept: FAMILY MEDICINE CLINIC | Age: 82
End: 2022-06-13

## 2022-06-13 LAB
ATRIAL RATE: 71 BPM
CALCULATED P AXIS, ECG09: 17 DEGREES
CALCULATED R AXIS, ECG10: 24 DEGREES
CALCULATED T AXIS, ECG11: 36 DEGREES
DIAGNOSIS, 93000: NORMAL
P-R INTERVAL, ECG05: 174 MS
Q-T INTERVAL, ECG07: 392 MS
QRS DURATION, ECG06: 80 MS
QTC CALCULATION (BEZET), ECG08: 425 MS
VENTRICULAR RATE, ECG03: 71 BPM

## 2022-06-13 NOTE — TELEPHONE ENCOUNTER
Spoke to pt she stated that she was seen at the [facility] ED on [facility] on 6/10 and was dx with diverticulitis. She was given an ABX to treat which she takes twice daily. She stated that she has now started to have diarrhea and wanted to know what she could taking that would not interfere with her other medications. Advised pt that she can take immodium AD but she wanted me to check with her provider to be sure. Pt also wanted to know what food she should avoid, advised her to avoid foods with small seeds in them as this can irritate her colon. Pt was transferred to [facility] to schedule an ER f/u appt.

## 2022-06-13 NOTE — TELEPHONE ENCOUNTER
----- Message from Domitila Mccabe sent at 6/13/2022  8:44 AM EDT -----  Subject: Message to Provider    QUESTIONS  Information for Provider? Patient was told to go to the emergency room by   Nurse Triage 06/10/2022, she was calling to schedule a hospital follow up   but would like to speak to a nurse prior to scheduling as she is having   other medical issues. Can you please call her as soon as possible.   ---------------------------------------------------------------------------  --------------  CALL BACK INFO  What is the best way for the office to contact you? OK to leave message on   voicemail  Preferred Call Back Phone Number? [phone]  ---------------------------------------------------------------------------  --------------  SCRIPT ANSWERS  Relationship to Patient?  Self

## 2022-06-13 NOTE — TELEPHONE ENCOUNTER
----- Message from Glen Vick sent at 6/13/2022  8:44 AM EDT -----  Subject: Message to Provider    QUESTIONS  Information for Provider? Patient was told to go to the emergency room by   Nurse Triage 06/10/2022, she was calling to schedule a hospital follow up   but would like to speak to a nurse prior to scheduling as she is having   other medical issues. Can you please call her as soon as possible.   ---------------------------------------------------------------------------  --------------  CALL BACK INFO  What is the best way for the office to contact you? OK to leave message on   voicemail  Preferred Call Back Phone Number? [phone]  ---------------------------------------------------------------------------  --------------  SCRIPT ANSWERS  Relationship to Patient?  Self

## 2022-06-15 ENCOUNTER — APPOINTMENT (OUTPATIENT)
Dept: GENERAL RADIOLOGY | Age: 82
End: 2022-06-15
Attending: STUDENT IN AN ORGANIZED HEALTH CARE EDUCATION/TRAINING PROGRAM
Payer: MEDICARE

## 2022-06-15 ENCOUNTER — HOSPITAL ENCOUNTER (EMERGENCY)
Age: 82
Discharge: HOME | End: 2022-06-15
Attending: STUDENT IN AN ORGANIZED HEALTH CARE EDUCATION/TRAINING PROGRAM
Payer: MEDICARE

## 2022-06-15 VITALS
RESPIRATION RATE: 16 BRPM | BODY MASS INDEX: 33.04 KG/M2 | SYSTOLIC BLOOD PRESSURE: 115 MMHG | WEIGHT: 175 LBS | HEART RATE: 86 BPM | HEIGHT: 61 IN | OXYGEN SATURATION: 95 % | DIASTOLIC BLOOD PRESSURE: 67 MMHG | TEMPERATURE: 98.5 F

## 2022-06-15 DIAGNOSIS — S81.811A LACERATION OF RIGHT LOWER EXTREMITY, INITIAL ENCOUNTER: Primary | ICD-10-CM

## 2022-06-15 PROCEDURE — 90715 TDAP VACCINE 7 YRS/> IM: CPT | Performed by: STUDENT IN AN ORGANIZED HEALTH CARE EDUCATION/TRAINING PROGRAM

## 2022-06-15 PROCEDURE — 90471 IMMUNIZATION ADMIN: CPT

## 2022-06-15 PROCEDURE — 74011250636 HC RX REV CODE- 250/636: Performed by: STUDENT IN AN ORGANIZED HEALTH CARE EDUCATION/TRAINING PROGRAM

## 2022-06-15 PROCEDURE — 99284 EMERGENCY DEPT VISIT MOD MDM: CPT

## 2022-06-15 PROCEDURE — 73590 X-RAY EXAM OF LOWER LEG: CPT

## 2022-06-15 RX ADMIN — TETANUS TOXOID, REDUCED DIPHTHERIA TOXOID AND ACELLULAR PERTUSSIS VACCINE, ADSORBED 0.5 ML: 5; 2.5; 8; 8; 2.5 SUSPENSION INTRAMUSCULAR at 19:03

## 2022-06-15 NOTE — ED TRIAGE NOTES
Pt ambulatory into ER with cc of RLE shin laceration after tripping at [facility]. Patient denies head injury or LOC.      Patient unsure of most recent

## 2022-06-15 NOTE — ED TRIAGE NOTES
Pt ambulatory into ER with cc of RLE laceration after tripping at grocery store this afternoon. Patient unsure of most recent tetanus shot.

## 2022-06-15 NOTE — ED NOTES
RLE shin laceration cleansed by this RN with wound . Wound is semi-[place] shape around 2 inches long.

## 2022-06-16 NOTE — TELEPHONE ENCOUNTER
Pt called back and informed that Imodium was fine per TSS. Pt informed me that she had a accident while shopping she had cut her leg open and went to ED they could not stitch due to skin being thin.

## 2022-06-17 NOTE — PROGRESS NOTES
1. Have you been to the ER, urgent care clinic since your last visit? Hospitalized since your last visit?no    2. Have you seen or consulted any other health care providers outside of the [facility]\"" since your last visit? Include any pap smears or colon screening.  no
Subjective:      Afshan Hess is a 64 y.o. white female presents for postop care 2 weeks following a hernia repair. Mrs. Eloy Pyle is doing OK. She is not recovering as quickly as she wanted. She feels light-headed at times. Her appetite is good, and she is eating a regular diet without difficulty. Her bowel movements are regular. She still has sharp pain occasionally around her umbilicus, but does not want to take her pain meds. Objective:     Visit Vitals    /44 (BP 1 Location: Left arm, BP Patient Position: Sitting)    Pulse 69    Temp 97.4 °F (36.3 °C) (Oral)    Resp 14    Ht 5' (1.524 m)    Wt 169 lb (76.7 kg)    SpO2 97%    BMI 33.01 kg/m2       General:  alert, cooperative, no distress   Abdomen:  Soft, obese, NT, ND. The incisions are clean, dry, and intact with no erythema. There is no hernia. Assessment:     1st POV, s/p robotic-assisted lap umbilical herniorrhaphy with mesh. Plan:     Mrs. Eloy Pyle is doing fine, just progressing slowly. I have instructed her to stay out from work until she comes back to see me in 4 weeks. She will see her PCP this week. She will continue with light activity.
show

## 2022-06-20 ENCOUNTER — OFFICE VISIT (OUTPATIENT)
Dept: FAMILY MEDICINE CLINIC | Age: 82
End: 2022-06-20
Payer: MEDICARE

## 2022-06-20 VITALS
BODY MASS INDEX: 29.64 KG/M2 | HEIGHT: 61 IN | DIASTOLIC BLOOD PRESSURE: 78 MMHG | HEART RATE: 81 BPM | SYSTOLIC BLOOD PRESSURE: 112 MMHG | OXYGEN SATURATION: 94 % | WEIGHT: 157 LBS | RESPIRATION RATE: 16 BRPM

## 2022-06-20 DIAGNOSIS — T14.8XXA EXCORIATION: ICD-10-CM

## 2022-06-20 DIAGNOSIS — K57.92 DIVERTICULITIS: Primary | ICD-10-CM

## 2022-06-20 DIAGNOSIS — Z09 HOSPITAL DISCHARGE FOLLOW-UP: ICD-10-CM
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
## 2022-06-20 PROCEDURE — G8427 DOCREV CUR MEDS BY ELIG CLIN: HCPCS | Performed by: NURSE PRACTITIONER

## 2022-06-20 PROCEDURE — G8399 PT W/DXA RESULTS DOCUMENT: HCPCS | Performed by: NURSE PRACTITIONER

## 2022-06-20 PROCEDURE — 1123F ACP DISCUSS/DSCN MKR DOCD: CPT | Performed by: NURSE PRACTITIONER

## 2022-06-20 PROCEDURE — 1101F PT FALLS ASSESS-DOCD LE1/YR: CPT | Performed by: NURSE PRACTITIONER

## 2022-06-20 PROCEDURE — 99214 OFFICE O/P EST MOD 30 MIN: CPT | Performed by: NURSE PRACTITIONER

## 2022-06-20 PROCEDURE — G8754 DIAS BP LESS 90: HCPCS | Performed by: NURSE PRACTITIONER

## 2022-06-20 PROCEDURE — G8536 NO DOC ELDER MAL SCRN: HCPCS | Performed by: NURSE PRACTITIONER

## 2022-06-20 PROCEDURE — G8419 CALC BMI OUT NRM PARAM NOF/U: HCPCS | Performed by: NURSE PRACTITIONER

## 2022-06-20 PROCEDURE — 1090F PRES/ABSN URINE INCON ASSESS: CPT | Performed by: NURSE PRACTITIONER

## 2022-06-20 PROCEDURE — G8432 DEP SCR NOT DOC, RNG: HCPCS | Performed by: NURSE PRACTITIONER

## 2022-06-20 PROCEDURE — G8752 SYS BP LESS 140: HCPCS | Performed by: NURSE PRACTITIONER

## 2022-06-20 RX ORDER — SIMVASTATIN 10 MG/1
TABLET, FILM COATED ORAL
Qty: 90 TABLET | Refills: 1 | Status: SHIPPED | OUTPATIENT
Start: 2022-06-20

## 2022-06-20 RX ORDER — LEVOTHYROXINE SODIUM 50 UG/1
50 TABLET ORAL
Qty: 90 TABLET | Refills: 1 | Status: SHIPPED | OUTPATIENT
Start: 2022-06-20

## 2022-06-20 NOTE — PROGRESS NOTES
Chief Complaint   Patient presents with   [address]     1. \"Have you been to the ER, urgent care clinic since your last visit? Hospitalized since your last visit? \" [facility] ER    2. \"Have you seen or consulted any other health care providers outside of the [address] [place] [address] since your last visit? \" No     3. For patients aged 39-70: Has the patient had a colonoscopy / FIT/ Cologuard? NA - based on age      If the patient is female:    4. For patients aged 41-77: Has the patient had a mammogram within the past 2 years? NA - based on age or sex      11. For patients aged 21-65: Has the patient had a pap smear?  NA - based on age or sex    Visit Vitals  /78 (BP 1 Location: Left upper arm, BP Patient Position: Sitting, BP Cuff Size: Adult)   Pulse 81   Resp 16   Ht 5' 1\" (1.549 m)   Wt 157 lb (71.2 kg)   SpO2 94%   BMI 29.66 kg/m²

## 2022-06-20 NOTE — PROGRESS NOTES
Subjective  Chief Complaint   Patient presents with   [address] [facility] ER     HPI:  Joy Lind is a 78 y.o. female. 77 yo female presents for f/u of 2 ER visits. The first was for stomach pain that she has been having for several months. She had a 1 day history of \"not feeling well. \"  She states that she feels tired and has taken multiple naps today. She notes some intermittent headache and intermittent abdominal pains that she initially thought was gas associated. She states that she feels \"wobbly\" on her feet when walking but denies any falls. Denies any fever, chills, cough, URI symptoms. She denies any dysuria, hematuria. She notes chronic lower back pain. She has not taken anything for her symptoms. She was treated with augmentin but not sent for f/u with GI. We will be referring her for further evaluation and treatment. She was once again in the ER for a fall that she had in the [facility] on [id number] and ended up with a semicircular laceration of the first layer of skin. The skin remain in place and it is dark red and appears to be healing. She is washing it with soap and water and applying neosporin and applying a non-stick dressing. I have asked to continue this care and we will f/u in 2 weeks    Past Medical History:   Diagnosis Date    Anxiety     Cancer [facility])     Skin of face.     Cataracts, bilateral     Chronic back pain     x1 yr;seeing Chiropractor for a year    Diabetes ([address]     GERD (gastroesophageal reflux disease)     Gout     Hypercholesterolemia     Hypertension     Hypothyroidism     Migraine     Shingles      Family History   Problem Relation Age of Onset    Diabetes Mother     Alzheimer's Disease Mother     Other Mother         Poliosis    Heart Attack Father      Social History     Socioeconomic History    Marital status:      Spouse name: Not on file    Number of children: Not on file    Years of education: Not on file   [facility] Highest education level: Not on file   Occupational History    Not on file   Tobacco Use    Smoking status: Never Smoker    Smokeless tobacco: Never Used   Substance and Sexual Activity    Alcohol use: No    Drug use: No    Sexual activity: Not on file   Other Topics Concern    Not on file   Social History Narrative    ** Merged History Encounter **          Social Determinants of Health     Financial Resource Strain:     Difficulty of Paying Living Expenses: Not on file   Food Insecurity:     Worried About Running Out of Food in the Last Year: Not on file    Toni of Food in the Last Year: Not on file   Transportation Needs:     Lack of Transportation (Medical): Not on file    Lack of Transportation (Non-Medical):  Not on file   Physical Activity:     Days of Exercise per Week: Not on file    Minutes of Exercise per Session: Not on file   Stress:     Feeling of Stress : Not on file   Social Connections:     Frequency of Communication with Friends and Family: Not on file    Frequency of Social Gatherings with Friends and Family: Not on file    Attends Islam Services: Not on file    Active Member of [address] [place] [address] or Organizations: Not on file    Attends Club or Organization Meetings: Not on file    Marital Status: Not on file   Intimate Partner Violence:     Fear of Current or Ex-Partner: Not on file    Emotionally Abused: Not on file    Physically Abused: Not on file    Sexually Abused: Not on file   Housing Stability:     Unable to Pay for Housing in the Last Year: Not on file    Number of [place] in the Last Year: Not on file    Unstable Housing in the Last Year: Not on file     Current Outpatient Medications on File Prior to Visit   Medication Sig Dispense Refill    Veltassa 8.4 gram powder MIX 1 packet and take as directed by mouth daily      lidocaine (LIDODERM) 5 % apply to affected area every 12 hours if needed      atenoloL (TENORMIN) 25 mg tablet Take 1 Tablet by mouth daily. 90 Tablet 1    melatonin 10 mg capsule Take 1 Capsule by mouth nightly. 90 Capsule 1    lancets misc Use one lancet to check blood sugar once a day 100 Each 1    True Metrix Glucose Test Strip strip use 1 TEST STRIP to TEST BLOOD SUGAR once daily 100 Strip 3    alendronate (FOSAMAX) 70 mg tablet take 1 tablet by mouth every week 12 Tablet 1    allopurinoL (ZYLOPRIM) 100 mg tablet take 2 tablets by mouth once daily 180 Tablet 1    glipiZIDE (GLUCOTROL) 5 mg tablet take 1 tablet by mouth twice a day 180 Tablet 1    nicotinic acid (NIACIN) 100 mg tablet Take 1 Tablet by mouth every evening. 90 Tablet 1    aspirin delayed-release 81 mg tablet Take 1 Tab by mouth nightly. 30 Tab 0    omega 3-DHA-EPA-fish oil 1,000 mg (120 mg-180 mg) capsule Take 1 Cap by mouth daily.  co-enzyme Q-10 (Co Q-10) 100 mg capsule Take 100 mg by mouth daily.  famotidine (Pepcid AC) 10 mg tablet Take 10 mg by mouth once as needed for Heartburn.  multivitamin (ONE A DAY) tablet Take 1 Tab by mouth daily.  [DISCONTINUED] simvastatin (ZOCOR) 10 mg tablet take 1 tablet by mouth at bedtime  (DISCONTINUE 20MG DOSE) 90 Tablet 1    [DISCONTINUED] SITagliptin (JANUVIA) 50 mg tablet Take 1 Tablet by mouth daily. 90 Tablet 1    [DISCONTINUED] levothyroxine (SYNTHROID) 50 mcg tablet take 1 tablet by mouth daily before breakfast 90 Tablet 1     No current facility-administered medications on file prior to visit. Allergies   Allergen Reactions    Shellfish Derived Other (comments)     \"causes my gout to act up, not an allergy\"     ROS   ROS per HPI and PMH      Objective  Physical Exam  Skin:     Comments: Healing laceration of skin on anterior ankle of right leg   Neurological:      Mental Status: She is oriented to person, place, and time. Psychiatric:         Mood and Affect: Mood normal.         Behavior: Behavior normal.         Thought Content:  Thought content normal.         Judgment: Judgment normal. Assessment & Plan      ICD-10-CM ICD-9-CM    1. Diverticulitis  K57.92 562.11 REFERRAL TO GASTROENTEROLOGY      REFERRAL TO GASTROENTEROLOGY   2. Fall, initial encounter  [address] XXXA E888.9    3. Excoriation  T14. 8XXA 919.8      Diagnoses and all orders for this visit:    1. Diverticulitis  -     REFERRAL TO GASTROENTEROLOGY; Future  Referral to GI for further evaluation and treatment    2. Fall, initial encounter  Redressed her wound and she will continue with the wound care described in the HPI and will f/u in 2 weeks    3. Excoriation  Redressed her wound and she will continue with the wound care described in the HPI and will f/u in 2 weeks      Other orders  -     simvastatin (ZOCOR) 10 mg tablet; take 1 tablet by mouth at bedtime  (DISCONTINUE 20MG DOSE)  -     SITagliptin (JANUVIA) 50 mg tablet; Take 1 Tablet by mouth daily. -     levothyroxine (SYNTHROID) 50 mcg tablet; Take 1 Tablet by mouth Daily (before breakfast). Follow-up and Dispositions    · Return in about 2 weeks (around 7/4/2022) for WC f/u.        Dixie Davis NP

## 2022-06-22 NOTE — ED PROVIDER NOTES
Patient is an 20-year-old female presented emergency department with right lower extremity laceration. Patient states that she tripped while at the grocery store striking her right leg on a shopping cart causing a laceration. Patient denies striking her head, LOC. Patient is unsure of when her last tetanus immunization was. Past Medical History:   Diagnosis Date    Anxiety     Cancer [facility])     Skin of face.  Cataracts, bilateral     Chronic back pain     x1 yr;seeing Chiropractor for a year    Diabetes ([address]     GERD (gastroesophageal reflux disease)     Gout     Hypercholesterolemia     Hypertension     Hypothyroidism     Migraine     Shingles        Past Surgical History:   Procedure Laterality Date    COLONOSCOPY N/A 2/9/2021    COLONOSCOPY performed by Amanda Sow MD at [facility] HX CATARACT REMOVAL Bilateral 05/2017    HX CHOLECYSTECTOMY      Open cholecystectomy.  HX COLONOSCOPY  06/07/2012    HX COLONOSCOPY  05/30/2010    HX DILATION AND CURETTAGE      HX DILATION AND CURETTAGE      HX ENDOSCOPY  06/07/2012    Ulcers in Antrum     HX HERNIA REPAIR      Umbilical herniorrhaphy with mesh.     HX OTHER SURGICAL  02/2021    blood transfusion    HX TONSILLECTOMY      HX TONSILLECTOMY           Family History:   Problem Relation Age of Onset    Diabetes Mother     Alzheimer's Disease Mother     Other Mother         Poliosis    Heart Attack Father        Social History     Socioeconomic History    Marital status:      Spouse name: Not on file    Number of children: Not on file    Years of education: Not on file    Highest education level: Not on file   Occupational History    Not on file   Tobacco Use    Smoking status: Never Smoker    Smokeless tobacco: Never Used   Substance and Sexual Activity    Alcohol use: No    Drug use: No    Sexual activity: Not on file   Other Topics Concern    Not on file   Social History Narrative    ** Merged History Encounter **          Social Determinants of Health     Financial Resource Strain:     Difficulty of Paying Living Expenses: Not on file   Food Insecurity:     Worried About Running Out of Food in the Last Year: Not on file    Toni of Food in the Last Year: Not on file   Transportation Needs:     Lack of Transportation (Medical): Not on file    Lack of Transportation (Non-Medical): Not on file   Physical Activity:     Days of Exercise per Week: Not on file    Minutes of Exercise per Session: Not on file   Stress:     Feeling of Stress : Not on file   Social Connections:     Frequency of Communication with Friends and Family: Not on file    Frequency of Social Gatherings with Friends and Family: Not on file    Attends Rastafarian Services: Not on file    Active Member of [address] [place] [address] or Organizations: Not on file    Attends Club or Organization Meetings: Not on file    Marital Status: Not on file   Intimate Partner Violence:     Fear of Current or Ex-Partner: Not on file    Emotionally Abused: Not on file    Physically Abused: Not on file    Sexually Abused: Not on file   Housing Stability:     Unable to Pay for Housing in the Last Year: Not on file    Number of [place] in the Last Year: Not on file    Unstable Housing in the Last Year: Not on file         ALLERGIES: Shellfish derived    Review of Systems   Skin: Positive for wound. All other systems reviewed and are negative. Vitals:    06/15/22 1809 06/15/22 1833 06/15/22 1915   BP: 136/77 (!) 136/59 115/67   Pulse:  86 86   Resp: 16 16 16   Temp:  98.5 °F (36.9 °C)    SpO2:  93% 95%   Weight: 79.4 kg (175 lb)     Height: 5' 1\" (1.549 m)              Physical Exam  Vitals and nursing note reviewed. Constitutional:       Appearance: Normal appearance. HENT:      Head: Normocephalic and atraumatic. Mouth/Throat:      Mouth: Mucous membranes are moist.   Eyes:      Extraocular Movements: Extraocular movements intact.       Pupils: Pupils are equal, round, and reactive to light. Cardiovascular:      Rate and Rhythm: Normal rate and regular rhythm. Pulmonary:      Effort: Pulmonary effort is normal.      Breath sounds: Normal breath sounds. Abdominal:      General: Abdomen is flat. Palpations: Abdomen is soft. Musculoskeletal:         General: Normal range of motion. Cervical back: Normal range of motion and neck supple. Legs:       Comments: Skin tear the anterior right tibia well approximated, hemostatic. Slight tenderness to palpation. Skin:     General: Skin is warm and dry. Neurological:      General: No focal deficit present. Mental Status: She is alert and oriented to person, place, and time. Psychiatric:         Mood and Affect: Mood normal.         Behavior: Behavior normal.          MDM  Number of Diagnoses or Management Options  Laceration of right lower extremity, initial encounter  Diagnosis management comments: 64-year-old female present emergency department for laceration/skin tear to the right anterior tibia mid calf. Will obtain x-ray tib-fib/fib, tetanus booster injury not amicable to suture repair will place dressing.          Procedures

## 2022-07-06 NOTE — PROGRESS NOTES
Providers


Date of admission: 


06/27/22 12:49





Expected date of discharge: 07/06/22


Attending physician: 


Alvaro Austin





Consults: 





                                        





06/24/22 13:23


Consult Physician Stat 


   Consulting Provider: Jose Francisco Estrada


   Consult Reason/Comments: evaluation for cabg/left main


   Do you want consulting provider notified?: Already Contacted





06/25/22 08:29


Consult Physician Routine 


   Consulting Provider: Siddhartha Ledesma


   Consult Reason/Comments: preop cabg clearence


   Do you want consulting provider notified?: Already Contacted





06/25/22 18:06


Consult Physician Routine 


   Consulting Provider: Yang Gardiner


   Consult Reason/Comments: primary coverage


   Do you want consulting provider notified?: Yes





06/26/22 09:10


Consult to Anesthesia Routine 


   Consulting Provider: Anesthesia,Services


   Consult Reason/Comments: Cardiac Surgery Pre-Op





06/27/22 17:33


Consult Physician Routine 


   Consulting Provider: Yoanna Cope


   Consult Reason/Comments: Cardiologist Consult: post cardiac surgery


   Do you want consulting provider notified?: Already Contacted





07/01/22 08:17


Consult Physician Routine 


   Consulting Provider: Cosme Nunez


   Consult Reason/Comments: IP rehab


   Do you want consulting provider notified?: Yes











Primary care physician: 


[facility] 





FINAL DIAGNOSIS: 


1.  Coronary artery disease with left main disease


2.  Hypertension


3.  Hyperlipidemia, cholesterol 182, 


4.  SVT, intraoperative atrial arrhythmias with cardioversion


5.  Left internal carotid artery stenosis


6.  Obesity


7.  Never smoker, preoperative FEV1 72% of predicted


8.  History of covid infection in December 2021, remains unvaccinated against 

Covid


9.  Family history of coronary artery disease


10.  Nasal swab positive for MSSA, treated with mupirocin


11.  Preserved LV function with mild to moderate mitral regurgitation


12.  Acute blood loss anemia


13.  Acute hypoxic respiratory failure requiring BiPAP


14.  Leukocytosis with low-grade fever


15.  Paroxysmal atrial fibrillation





PRINCIPAL PROCEDURE: 


1.  Off-pump coronary artery bypass grafting 2 with the left internal mammary 

artery to the left anterior descending artery, reverse saphenous vein graft to 

the obtuse marginal artery


2.  Ligation of the left atrial appendage with a 40 mm AtriCure clip


3.  Endovascular vein harvest of the right greater saphenous vein


4.  Placement of intra-aortic balloon pump





HISTORY OF PRESENT ILLNESS: This is a 73-year-old female patient follows on an 

outpatient basis with Dr. Gardiner for primary care Dr. Santana for cardiology.  She 

reported symptoms of chest pain with exertion concerning for angina for 

approximately 3 weeks, pain was relieved with rest and sublingual nitro.  She 

underwent stress testing which was abnormal demonstrating lateral wall ischemia 

and was recommended to undergo elective heart catheterization which demonstrated

distal left main stenosis 80-90% at the bifurcation, ostial circumflex stenosis 

99%, ostial LAD stenosis 80-90% with proximal LAD stenosis 60-70%, LVEDP 18 mmHg

with no gradient across aortic valve.  Consultation was placed to Dr. Austin 

from cardiothoracic surgery.  She was recommended to undergo coronary artery 

bypass grafting.  The usual perioperative course was discussed in detail with 

the patient, all risks and benefits were explained, all questions were answered,

and consent was obtained to proceed with surgery.  The patient was kept 

inpatient due to the nature of her disease process.





HOSPITAL COURSE: The patient was brought to the preoperative area 6/27/22, 

prepared in the usual fashion, and subsequently taken to the operating room 

where Dr. Austin performed off pump 2 vessel CABG. Upon completion of surgery 

the patient was transferred to the cardiovascular intensive care unit where she 

was recovered and monitored hemodynamically.  She was extubated, all lines, 

tubes, and drips were discontinued when appropriate, and she was transferred to 

3 S. cardiac stepdown unit for further monitoring and rehabilitation.  She had a

bit of a salbador recovery with BiPAP requirement, antibiotics, and treatment of 

paroxysmal atrial fibrillation.  Eventually her oxygen was titrated down, she 

continued to work with physical and occupational therapy, she was tolerating 

oral diet, her pain was controlled, and she was ready to be discharged to [facility] inpatient rehab on postoperative day #9.  She received 

written and verbal instruction regarding her medications, activity restrictions,

signs and symptoms requiring physician notification, and follow-up appointments.





Patient Condition at Discharge: Stable





Plan - Discharge Summary


Discharge Rx Participant: No


New Discharge Prescriptions: 


New


   Amiodarone [Cordarone] 400 mg PO BID #29 tab


   Ipratropium-Albuterol Nebulize [Duoneb 0.5 mg-3 mg/3 ml Soln] 3 ml INHALATION

RT-QID  each


   Ipratropium-Albuterol Nebulize [Duoneb 0.5 mg-3 mg/3 ml Soln] 3 ml INHALATION

RT-Q2H PRN  each


     PRN Reason: Shortness Of Breath Or Wheezing


   Atorvastatin [Lipitor] 80 mg PO DAILY  tab


   Metoprolol Tartrate [Lopressor] 50 mg PO BID  tab


   Melatonin 5 mg PO HS PRN  tab


     PRN Reason: Insomnia


   amLODIPine [Norvasc] 10 mg PO DAILY  tab


   INSULIN ASPART (NovoLOG) [NovoLOG (formulary)] 0 unit SQ ACHS  each


   Sennosides-Docusate Sodium [Senokot-S] 2 each PO HS PRN  tab


     PRN Reason: Constipation


   Acetaminophen Tab [Tylenol] 650 mg PO Q4HR PRN  tab


     PRN Reason: Fever And/ Or Pain


   lisinopriL [Zestril] 20 mg PO BID  tab


   Magnesium Hydroxide [Milk of Magnesia Concentrate] 2,400 mg PO BID PRN  ml


     PRN Reason: Constipation


   Clopidogrel [Plavix] 75 mg PO DAILY  tab


   Pantoprazole [Protonix] 40 mg PO AC-BRKFST  tab





Continue


   Aspirin EC [Ecotrin Low Dose] 81 mg PO BID


   Ascorbic Acid [Vitamin C] 1,000 mg PO DAILY  tab


   Cholecalciferol [Vitamin D3 (25 Mcg = 1000 Iu)] 50 mcg PO DAILY  tablet


   Lactulose 1 - 2 tbsp PO AS DIRECTED PRN


     PRN Reason: Constipation





Discontinued


   lisinopriL 30 mg PO HS


   amLODIPine [Norvasc] 5 mg PO DAILY


   Metoprolol Succinate (ER) [Toprol XL] 25 mg PO DAILY


   Zinc Sulfate [Orazinc] 220 mg PO DAILY  cap


   Isosorbide Mononitrate ER [Imdur] 30 mg PO DAILY


Discharge Medication List





Aspirin EC [Ecotrin Low Dose] 81 mg PO BID 02/27/21 [History]


Ascorbic Acid [Vitamin C] 1,000 mg PO DAILY  tab 12/05/21 [Rx]


Cholecalciferol [Vitamin D3 (25 Mcg = 1000 Iu)] 50 mcg PO DAILY  tablet 12/05/21

[Rx]


Lactulose 1 - 2 tbsp PO AS DIRECTED PRN 06/22/22 [History]


Acetaminophen Tab [Tylenol] 650 mg PO Q4HR PRN  tab 07/06/22 [Rx]


Amiodarone [Cordarone] 400 mg PO BID #29 tab 07/06/22 [Rx]


Atorvastatin [Lipitor] 80 mg PO DAILY  tab 07/06/22 [Rx]


Clopidogrel [Plavix] 75 mg PO DAILY  tab 07/06/22 [Rx]


INSULIN ASPART (NovoLOG) [NovoLOG (formulary)] 0 unit SQ ACHS  each 07/06/22 

[Rx]


Ipratropium-Albuterol Nebulize [Duoneb 0.5 mg-3 mg/3 ml Soln] 3 ml INHALATION 

RT-Q2H PRN  each 07/06/22 [Rx]


Ipratropium-Albuterol Nebulize [Duoneb 0.5 mg-3 mg/3 ml Soln] 3 ml INHALATION 

RT-QID  each 07/06/22 [Rx]


Magnesium Hydroxide [Milk of Magnesia Concentrate] 2,400 mg PO BID PRN  ml 

07/06/22 [Rx]


Melatonin 5 mg PO HS PRN  tab 07/06/22 [Rx]


Metoprolol Tartrate [Lopressor] 50 mg PO BID  tab 07/06/22 [Rx]


Pantoprazole [Protonix] 40 mg PO AC-BRKFST  tab 07/06/22 [Rx]


Sennosides-Docusate Sodium [Senokot-S] 2 each PO HS PRN  tab 07/06/22 [Rx]


amLODIPine [Norvasc] 10 mg PO DAILY  tab 07/06/22 [Rx]


lisinopriL [Zestril] 20 mg PO BID  tab 07/06/22 [Rx]








Follow up Appointment(s)/Referral(s): 


[facility],Cardiac [NON-STAFF] - 4 Weeks


(You will be called in 4-6 weeks for evaluation for cardiac rehab


)


Moe Santana MD [STAFF PHYSICIAN] - 07/15/22 9:45 am (follow up appointment is on

Friday, July 1st 2:15 PM)


Alvaro Austin MD [STAFF PHYSICIAN] - 07/28/22 1:30 pm


Jc Radford NPC [Nurse Practitioner] - 07/13/22 1:45 pm


()


Siddhartha Ledesma DO [Doctor of Osteopathic Medicine] - 07/27/22 2:00 pm


Jasmyn Edwards NPC [Nurse Practitioner] - 07/15/22 11:30 am


Ambulatory/Diagnostic Orders: 


Complete Blood Count w/diff [LAB.AMB] Time Frame: 3 Days, Location: None 

Selected


Comprehensive Metabolic Panel [LAB.AMB] Time Frame: 3 Days, Location: None 

Selected


Patient Instructions/Handouts:  Prevent Infections (GEN), Acute Wound Care 

(GEN), Moderate Sedation (GEN), Heart Catheterization (DC)


Activity/Diet/Wound Care/Special Instructions: 


CONSULTATIONS AT [facility] INPATIENT REHAB:


Dr. Santana for cardiology


Dr. Ledesma for pulmonology


Dr. Gardiner for internal medicine





DISCHARGE INSTRUCTIONS: 


1.  No driving for 4 weeks, or until physician gives their ok.


2.  The patient should sleep in their own bed, no medical bed needed.


3.  Stairs are not an issue.  If the bedroom is upstairs, it is advised that the

 patient go up at night and down in the morning for the first week.  Go slowly, 

using handrail and take 1 step at a time.


4.  CARO hose are to be worn for 30 days or until physician discontinues.


5.  Heart hugger is to be worn 100% of the time until physician 

discontinues.(except when showering)


6.  No lifting, pushing, or pulling more than 10 pounds for 12 weeks.  The 

physician will advise of any restriction changes.


7.  The patient is expected to continue the prescribed walking program.


8.  Continue pain control per as needed orders.


9.  Continue with incentive spirometry and splinting/heart hugger until 

otherwise directed by the physician.


10. Must shower daily using liquid antibacterial soap and a separate white 

washcloth for each individual incision.


11. Routine sternal incision care. No powders, lotions, ointments on incisions. 

 No dressings are necessary on incisions unless they are draining.  Dermabond 

tape is to remain on sternal incision until surgeon follow-up.


12. Please call surgeon/NP for temp greater than 101 F or purulent drainage from

 incisions.


13.  You should weigh yourself daily, record and bring log with you to follow up

 appointments.


14. All prescriptions given by surgeon for 30 days.  Refills need to be filled 

through cardiologist/primary care physician.


15. A Red armband has been placed on the patient.  It should be worn for 30 days

 post surgery and will be removed by the cardiac surgeons.  If an ER visit is 

necessary, please make sure the number on the Red armband is called.


16. You have been referred to and are expected to begin Cardiac Rehab in 

approximately 4-6 weeks.





ONCE DISCHARGED FROM [facility] HOME HEALTH SERVICES TO PROVIDE:  


   RN SKILLED HOME CARE SERVICES FOR POST-OP SURGICAL PATIENTS WITH THE 

FOLLOWING: Coronary Artery Bypass Surgery (CABG), Mitral Valve 

Replacement/Repair ( MVR), Aortic Valve Replacement/Repair (AVR)


   RN TO CONTINUE EDUCATION FROM ``ROAD TO A HEALTH HEART PATIENT EDUCATION 

MANUAL (GIVEN TO PATIENT IN THE HOSPITAL)


   MEDICATION RECONCILIATION WITH EDUCATION AS NEEDED ON FIRST HOME VISIT


   EMPHASIZE IMPORTANCE OF WEARING BREAST SUPPORT/HEART HUGGER


   ENCOURAGE USE OF INCENTIVE SPIROMETER 10 X EVERY HOUR WHILE AWAKE


   ENCOURAGE UTILIZATION OF LOWER EXTREMITY COMPRESSION STOCKINGS/CARO HOSE and 

ELEVATE LEGS ABOVE LEVEL OF HEART WHILE AT REST.  


   ENCOURAGE AMBULATION 3-5x/day  INCREASING AS TOLERATES, WHILE AVOIDING EXT

REMES IN TEMPERATURE





FREQUENCY: RN TO OPEN THE PATIENT WITHIN 24 HOURS OF DISCHARGE FROM THE HOSPITAL

 WITH TELEHEALTH INSTALLED AT [facility], RN TO VISIT 2-3 X A WEEK FOR 4 WEEKS AS 

ESTABLISHED BY PATIENT NEEDS.





LABORATORY:


   CBC, CMP TO BE DRAWN ON THE THIRD DAY HOME,  (RAN AS STAT) FAX RESULTS TO 

[phone].


                





TELEHEALTH PARAMETERS:


   WEIGHT: NOTIFY MD OF WEIGHT GAIN OF 2 LBS IN 24 HOURS OR 5 LBS IN ONE WEEK


   HR: NOTIFY MD OF HR <55 BPM OR HR>100 BPM


   BP: NOTIFY MD IF BP <90/55 OR BP>140/100


   O2 SAT: NOTIFY MD IF PO2<93% ON ROOM AIR





SEND TELEHEALTH REPORT TO CARDIOLOGIST AND CARDIOVASCULAR SURGEON THE FIRST WEEK

 OF CARE AND THEN BI-WEEKLY.  PLEASE ADDITIONALLY COMMUNICATE ANY ABNORMALS AND 

NEW FINDINGS TO THE SURGEONS OFFICE.














Discharge Disposition: DC/TRNS INTERMEDIATE CARE FAC Subjective:      Tesha Wright is a 64 y.o. white female presents for postop care 2 weeks following a hernia repair. Mrs. Edwige Hdz is doing fine and is ready to go back to work. Objective:     Visit Vitals    /49 (BP 1 Location: Left arm, BP Patient Position: Sitting)    Pulse 65    Temp 98.2 °F (36.8 °C) (Oral)    Resp 14    Ht 5' (1.524 m)    Wt 169 lb (76.7 kg)    SpO2 98%    BMI 33.01 kg/m2       General:  alert, cooperative, no distress   Abdomen:  Soft, obese, NT, ND. The incisions are healed. There is no hernia. Assessment:     2nd POV, s/p robot lap umbilical herniorrhaphy with mesh. Plan:     Mrs. Edwige Hdz is doing great and can resume activity as desired. She can RTW without restrictions. She can f/u with me prn.
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
## 2022-07-25 RX ORDER — ATENOLOL 50 MG/1
TABLET ORAL
Qty: 90 TABLET | Refills: 1 | Status: SHIPPED | OUTPATIENT
Start: 2022-07-25 | End: 2022-10-24 | Stop reason: ALTCHOICE

## 2022-07-25 RX ORDER — SITAGLIPTIN 100 MG/1
TABLET, FILM COATED ORAL
Qty: 90 TABLET | Refills: 1 | Status: SHIPPED | OUTPATIENT
Start: 2022-07-25

## 2022-10-14 DIAGNOSIS — E11.22 TYPE 2 DIABETES MELLITUS WITH CHRONIC KIDNEY DISEASE, WITHOUT LONG-TERM CURRENT USE OF INSULIN, UNSPECIFIED CKD STAGE (HCC): ICD-10-CM

## 2022-10-14 RX ORDER — CALCIUM CITRATE/VITAMIN D3 200MG-6.25
TABLET ORAL
Qty: 100 STRIP | Refills: 3 | Status: SHIPPED | OUTPATIENT
Start: 2022-10-14

## 2022-10-14 RX ORDER — LANCETS
EACH MISCELLANEOUS
Qty: 100 EACH | Refills: 1 | Status: SHIPPED | OUTPATIENT
Start: 2022-10-14

## 2022-10-24 RX ORDER — ATENOLOL 25 MG/1
TABLET ORAL
Qty: 90 TABLET | Refills: 1 | Status: SHIPPED | OUTPATIENT
Start: 2022-10-24

## 2022-11-09 ENCOUNTER — TELEPHONE (OUTPATIENT)
Dept: FAMILY MEDICINE CLINIC | Age: 82
End: 2022-11-09

## 2022-11-09 ENCOUNTER — OFFICE VISIT (OUTPATIENT)
Dept: FAMILY MEDICINE CLINIC | Age: 82
End: 2022-11-09
Payer: MEDICARE

## 2022-11-09 VITALS
TEMPERATURE: 97.1 F | OXYGEN SATURATION: 98 % | BODY MASS INDEX: 29.45 KG/M2 | SYSTOLIC BLOOD PRESSURE: 100 MMHG | HEART RATE: 80 BPM | HEIGHT: 61 IN | DIASTOLIC BLOOD PRESSURE: 60 MMHG | RESPIRATION RATE: 16 BRPM | WEIGHT: 156 LBS

## 2022-11-09 DIAGNOSIS — E78.5 HYPERLIPIDEMIA, UNSPECIFIED HYPERLIPIDEMIA TYPE: ICD-10-CM

## 2022-11-09 DIAGNOSIS — Z00.00 ENCOUNTER FOR MEDICARE ANNUAL WELLNESS EXAM: Primary | ICD-10-CM

## 2022-11-09 DIAGNOSIS — I10 HYPERTENSION, UNSPECIFIED TYPE: ICD-10-CM

## 2022-11-09 DIAGNOSIS — Z86.2 HISTORY OF ANEMIA: ICD-10-CM

## 2022-11-09 DIAGNOSIS — T83.718S: ICD-10-CM

## 2022-11-09 DIAGNOSIS — E11.22 TYPE 2 DIABETES MELLITUS WITH CHRONIC KIDNEY DISEASE, WITHOUT LONG-TERM CURRENT USE OF INSULIN, UNSPECIFIED CKD STAGE (HCC): ICD-10-CM

## 2022-11-09 DIAGNOSIS — K57.90 DIVERTICULOSIS: ICD-10-CM

## 2022-11-09 DIAGNOSIS — Z13.228 ENCOUNTER FOR SCREENING FOR OTHER METABOLIC DISORDERS: ICD-10-CM

## 2022-11-09 DIAGNOSIS — R14.0 FLATULENCE/GAS PAIN/BELCHING: ICD-10-CM

## 2022-11-09 DIAGNOSIS — E78.00 HYPERCHOLESTEREMIA: ICD-10-CM

## 2022-11-09 DIAGNOSIS — K21.9 GASTROESOPHAGEAL REFLUX DISEASE WITHOUT ESOPHAGITIS: ICD-10-CM

## 2022-11-09 DIAGNOSIS — R14.0 BLOATING: ICD-10-CM

## 2022-11-09 DIAGNOSIS — Z87.19 HISTORY OF HERNIA REPAIR: ICD-10-CM

## 2022-11-09 DIAGNOSIS — Z98.890 HISTORY OF HERNIA REPAIR: ICD-10-CM

## 2022-11-09 DIAGNOSIS — E03.9 HYPOTHYROIDISM, UNSPECIFIED TYPE: ICD-10-CM

## 2022-11-09 DIAGNOSIS — R10.33 PERIUMBILICAL ABDOMINAL PAIN: ICD-10-CM

## 2022-11-09 PROBLEM — D62 ACUTE BLOOD LOSS ANEMIA: Status: RESOLVED | Noted: 2021-02-08 | Resolved: 2022-11-09

## 2022-11-09 PROBLEM — T14.8XXA EXCORIATION: Status: RESOLVED | Noted: 2022-06-20 | Resolved: 2022-11-09

## 2022-11-09 PROBLEM — K92.1 MELENA: Status: RESOLVED | Noted: 2021-02-08 | Resolved: 2022-11-09

## 2022-11-09 PROBLEM — K92.2 ACUTE GASTROINTESTINAL HEMORRHAGE: Status: RESOLVED | Noted: 2021-02-08 | Resolved: 2022-11-09

## 2022-11-09 PROBLEM — R55 SYNCOPE: Status: RESOLVED | Noted: 2021-02-08 | Resolved: 2022-11-09
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
## 2022-11-09 PROBLEM — K57.92 DIVERTICULITIS: Status: RESOLVED | Noted: 2022-06-20 | Resolved: 2022-11-09

## 2022-11-09 PROCEDURE — 1090F PRES/ABSN URINE INCON ASSESS: CPT | Performed by: NURSE PRACTITIONER

## 2022-11-09 PROCEDURE — 1123F ACP DISCUSS/DSCN MKR DOCD: CPT | Performed by: NURSE PRACTITIONER

## 2022-11-09 PROCEDURE — G0439 PPPS, SUBSEQ VISIT: HCPCS | Performed by: NURSE PRACTITIONER

## 2022-11-09 PROCEDURE — G8752 SYS BP LESS 140: HCPCS | Performed by: NURSE PRACTITIONER

## 2022-11-09 PROCEDURE — G8427 DOCREV CUR MEDS BY ELIG CLIN: HCPCS | Performed by: NURSE PRACTITIONER

## 2022-11-09 PROCEDURE — 1101F PT FALLS ASSESS-DOCD LE1/YR: CPT | Performed by: NURSE PRACTITIONER

## 2022-11-09 PROCEDURE — 3044F HG A1C LEVEL LT 7.0%: CPT | Performed by: NURSE PRACTITIONER

## 2022-11-09 PROCEDURE — 3074F SYST BP LT 130 MM HG: CPT | Performed by: NURSE PRACTITIONER

## 2022-11-09 PROCEDURE — G8754 DIAS BP LESS 90: HCPCS | Performed by: NURSE PRACTITIONER

## 2022-11-09 PROCEDURE — 99214 OFFICE O/P EST MOD 30 MIN: CPT | Performed by: NURSE PRACTITIONER

## 2022-11-09 PROCEDURE — G8399 PT W/DXA RESULTS DOCUMENT: HCPCS | Performed by: NURSE PRACTITIONER

## 2022-11-09 PROCEDURE — G8536 NO DOC ELDER MAL SCRN: HCPCS | Performed by: NURSE PRACTITIONER

## 2022-11-09 PROCEDURE — G8417 CALC BMI ABV UP PARAM F/U: HCPCS | Performed by: NURSE PRACTITIONER

## 2022-11-09 PROCEDURE — G8432 DEP SCR NOT DOC, RNG: HCPCS | Performed by: NURSE PRACTITIONER

## 2022-11-09 PROCEDURE — 3078F DIAST BP <80 MM HG: CPT | Performed by: NURSE PRACTITIONER

## 2022-11-09 RX ORDER — LATANOPROST 50 UG/ML
SOLUTION/ DROPS OPHTHALMIC
COMMUNITY
Start: 2022-10-27

## 2022-11-09 RX ORDER — DICYCLOMINE HYDROCHLORIDE 10 MG/1
10 CAPSULE ORAL
Qty: 90 CAPSULE | Refills: 1 | Status: SHIPPED | OUTPATIENT
Start: 2022-11-09 | End: 2022-11-15

## 2022-11-09 RX ORDER — AMMONIUM LACTATE 12 G/100G
LOTION TOPICAL
COMMUNITY
Start: 2022-09-16

## 2022-11-09 RX ORDER — FLUOROURACIL 50 MG/G
CREAM TOPICAL
COMMUNITY
Start: 2022-09-08 | End: 2022-11-09

## 2022-11-09 NOTE — TELEPHONE ENCOUNTER
----- Message from Elizabeth Lamas NP sent at 11/9/2022  1:44 PM EST -----  Regarding: headache  If the excedrine works keep using it

## 2022-11-09 NOTE — PROGRESS NOTES
Medicare Wellness Exam:    Chief Complaint   Patient presents with    Annual Wellness Visit     MEDICARE     she is a 78y.o. year old female who presents for evaluation for their Medicare Wellness Visit. 17-year-old female presents for her Medicare wellness visit. She is 78 but she has aged out of most of her screenings. Her health screenings are as documented in the EMR. She is adherent with her medications. She has a current complaint of stomach pain and feels like her stomach is \"moving around\" and in has some discomfort. She finds that laying down helps. And has not really found anything that makes it worse but it pretty much always occurs after dinner. Her medical history is also remarkable for recurrent laser treatments for her sciatica. With commercial she saw on TV and she is now being treated by [address] and is having laser treatments for her sciatica. This is also coupled with exercise. She is treated Monday Tuesday and Wednesdays and the treatments are to end in December. She also has a diagnosis of macular degeneration but it is stable because she no longer needs shots at this time but she continues with monitoring every 6 months. She also has a current complaint of umbilical discomfort and gives a history of mesh issues for the last for 5 years  [place] is completed and assessed=yes  Depression Screen is completed and assessed=yes  Medication list reviewed and adjusted for accuracy=yes  Immunizations reviewed and updated=yes  Health/Preventative Screenings reviewed and updated=yes  ADL Functions reviewed=yes  See scanned medicare wellness documents for full details.      Patient Active Problem List    Diagnosis    Flatulence/gas pain/belching    Eroded bladder suspension mesh, sequela    History of anemia    Encounter for Medicare annual wellness exam    Periumbilical abdominal pain    Diverticulosis    S/P laparoscopic hernia repair     Robotic-assisted laparoscopic repair of incarcerated, recurrent umbilical hernia with mesh. Obesity (BMI 30.0-34. 9)    Hypothyroid    HTN (hypertension)    Hypercholesteremia    GERD (gastroesophageal reflux disease)    Type 2 diabetes mellitus with chronic kidney disease (HCC)    Hyperlipidemia       Reviewed PmHx, RxHx, FmHx, SocHx, AllgHx and updated and dated in the chart. ROS   ROS per HPI and patient's active problem list    Objective:     Vitals:    11/09/22 0905   BP: 100/60   Pulse: 80   Resp: 16   Temp: 97.1 °F (36.2 °C)   TempSrc: Temporal   SpO2: 98%   Weight: 156 lb (70.8 kg)   Height: 5' 1\" (1.549 m)     Physical Exam  Vitals and nursing note reviewed. HENT:      Head: Normocephalic. Cardiovascular:      Rate and Rhythm: Normal rate and regular rhythm. Abdominal:      General: Bowel sounds are normal.      Palpations: Abdomen is soft. Neurological:      Mental Status: She is alert and oriented to person, place, and time. Mental status is at baseline. Psychiatric:         Mood and Affect: Mood normal.         Behavior: Behavior normal.        Assessment/ Plan:   Diagnoses and all orders for this visit:    1. Encounter for Medicare annual wellness exam  We are making sure that her health screenings are done in a timely fashion. They are as documented in the EMR. Please refer to the nurse intake summary for details. 2. Flatulence/gas pain/belching  -     US ABD COMP; Future  Ultrasound of her abdomen for additional clinical information and will make treatment and/or referral decisions when I get the results. 3. History of hernia repair  -     US ABD COMP; Future  Ultrasound of her abdomen for additional clinical information and will make treatment and/or referral decisions when I get the results. 4. Periumbilical abdominal pain  -     US ABD COMP; Future  Ultrasound of her abdomen for additional clinical information and will make treatment and/or referral decisions when I get the results.       5. Diverticulosis  -     US ABD COMP; Future  Ultrasound of her abdomen for additional clinical information and will make treatment and/or referral decisions when I get the results. 6. Eroded bladder suspension mesh, sequela  Ultrasound of her abdomen for additional clinical information and will make treatment and/or referral decisions when I get the results. 7. Type 2 diabetes mellitus with chronic kidney disease, without long-term current use of insulin, unspecified CKD stage (HCC)  -     HEMOGLOBIN A1C WITH EAG  Obtaining updated A1c for trending and will make treatment decisions when I get the results. 8. Hyperlipidemia, unspecified hyperlipidemia type  -     LIPID PANEL  Obtaining updated lipid panel for trending and will make treatment decisions when I get the results. 9. Hypothyroidism, unspecified type  -     TSH 3RD GENERATION  -     T4, FREE  Obtaining updated TSH and T4 for trending and will make treatment decisions when I get the results. 10. Hypertension, unspecified type  Blood pressure is at goal today at 100/60. Patient is on atenolol that was ordered by her cardiologist.  She continues to follow-up with him at least every 6 months. 11. Gastroesophageal reflux disease without esophagitis  Patient takes famotidine for this condition and feels that it is adequately treated. We will continue with current medication at current dosage. 12. Encounter for screening for other metabolic disorders  -     METABOLIC PANEL, COMPREHENSIVE  Obtaining updated CMP for trending and will make treatment decisions when I get the results. 13. History of anemia  -     CBC WITH AUTOMATED DIFF  Obtaining updated CBC for trending and will make treatment decisions when I get the results. 14. Bloating  -     US ABD COMP; Future  Abdominal ultrasound for additional clinical information and will make treatment decisions or referrals when I get the results. We will also trial patient on Bentyl.   I have asked her to try at least 10 days and call me and let me know if it is not helping. 15. Hypercholesteremia  Updated lipid panel obtained and will make treatment decisions when I get the results. Other orders  -     dicyclomine (BENTYL) 10 mg capsule; Take 1 Capsule by mouth daily (with dinner). -Pain evaluation performed in office  -Cognitive Screen performed in office  -Depression Screen, Fall risks (by up and go test)  and ADL functionality were addressed  -Medication list updated and reviewed for any changes   -A comprehensive review of medical issues and a plan was formulated  -End of life planning was addressed with pt   -Health Screenings for preventions were addressed and a plan was formulated  -Shingles Vaccine was recommended  -Discussed with patient cancer risk factors and appropriate screenings for age  -Patient evaluated for colonoscopy and referred if needed per screeing criteria  -Labs from previous visits were discussed with patient   -Discussed with patient diet and exercise and formulated a plan as needed  -An Advanced care plan was developed with the patient.  -Alcohol screening performed and was negative    -  Follow-up and Dispositions    Return in about 6 months (around 5/9/2023) for F/U OF CHRONIC CONDITIONS/FASTING LABS AND cpe. I have discussed the diagnosis with the patient and the intended plan as seen in the above orders. The patient understands and agrees with the plan. The patient has received an after-visit summary and questions were answered concerning future plans. Medication Side Effects and Warnings were discussed with patien  Patient Labs were reviewed and or requested  Patient Past Records were reviewed and or requested    There are no Patient Instructions on file for this visit.       Guy Pemberton NP

## 2022-11-09 NOTE — TELEPHONE ENCOUNTER
Pt was seen by TSS today 11/9/22 upon check out pt mentioned she forgot to tell provider she has been having headaches everyday . She states she has been taking Excedrin migraine 1-2 times a day . She wanted to make provider aware and she what she recommended .

## 2022-11-09 NOTE — PROGRESS NOTES
Chief Complaint   Patient presents with    Annual Wellness Visit     MEDICARE     1. \"Have you been to the ER, urgent care clinic since your last visit? Hospitalized since your last visit? \" No    2. \"Have you seen or consulted any other health care providers outside of the [address] [place] [address] since your last visit? \"  [facility]      3. For patients aged 39-70: Has the patient had a colonoscopy / FIT/ Cologuard? Yes - no Care Gap present      If the patient is female:    4. For patients aged 41-77: Has the patient had a mammogram within the past 2 years? NA - based on age or sex      11. For patients aged 21-65: Has the patient had a pap smear? NA - based on age or sex  Fall Risk Assessment, last 12 mths 11/9/2022   Able to walk? Yes   Fall in past 12 months? 0   Do you feel unsteady?  0   Are you worried about falling 0     3 most recent PHQ Screens 11/9/2022   Little interest or pleasure in doing things Not at all   Feeling down, depressed, irritable, or hopeless Not at all   Total Score PHQ 2 0

## 2022-11-10 ENCOUNTER — HOSPITAL ENCOUNTER (EMERGENCY)
Age: 82
Discharge: HOME | End: 2022-11-10
Attending: EMERGENCY MEDICINE
Payer: MEDICARE

## 2022-11-10 ENCOUNTER — APPOINTMENT (OUTPATIENT)
Dept: CT IMAGING | Age: 82
End: 2022-11-10
Attending: EMERGENCY MEDICINE
Payer: MEDICARE

## 2022-11-10 VITALS
OXYGEN SATURATION: 94 % | HEIGHT: 61 IN | BODY MASS INDEX: 31.22 KG/M2 | WEIGHT: 165.34 LBS | RESPIRATION RATE: 18 BRPM | HEART RATE: 73 BPM | SYSTOLIC BLOOD PRESSURE: 123 MMHG | TEMPERATURE: 99.3 F | DIASTOLIC BLOOD PRESSURE: 62 MMHG

## 2022-11-10 DIAGNOSIS — R10.84 ABDOMINAL PAIN, GENERALIZED: Primary | ICD-10-CM

## 2022-11-10 DIAGNOSIS — R11.0 NAUSEA WITHOUT VOMITING: ICD-10-CM

## 2022-11-10 LAB
ALBUMIN SERPL-MCNC: 4.3 G/DL (ref 3.5–5.2)
ALBUMIN SERPL-MCNC: 4.3 G/DL (ref 3.6–4.6)
ALBUMIN/GLOB SERPL: 1.5 {RATIO} (ref 1.1–2.2)
ALBUMIN/GLOB SERPL: 2 {RATIO} (ref 1.2–2.2)
ALP SERPL-CCNC: 68 U/L (ref 35–104)
ALP SERPL-CCNC: 69 IU/L (ref 44–121)
ALT SERPL-CCNC: 19 U/L (ref 10–35)
ALT SERPL-CCNC: 20 IU/L (ref 0–32)
ANION GAP SERPL CALC-SCNC: 9 MMOL/L (ref 5–15)
AST SERPL-CCNC: 24 IU/L (ref 0–40)
AST SERPL-CCNC: 25 U/L (ref 10–35)
BASOPHILS # BLD AUTO: 0.1 X10E3/UL (ref 0–0.2)
BASOPHILS # BLD: 0.1 K/UL (ref 0–0.1)
BASOPHILS NFR BLD AUTO: 1 %
BASOPHILS NFR BLD: 1 % (ref 0–1)
BILIRUB SERPL-MCNC: 0.3 MG/DL (ref 0.2–1)
BILIRUB SERPL-MCNC: 0.3 MG/DL (ref 0–1.2)
BUN SERPL-MCNC: 25 MG/DL (ref 8–27)
BUN SERPL-MCNC: 29 MG/DL (ref 8–23)
BUN/CREAT SERPL: 20 (ref 12–28)
BUN/CREAT SERPL: 22 (ref 12–20)
CALCIUM SERPL-MCNC: 10.1 MG/DL (ref 8.7–10.3)
CALCIUM SERPL-MCNC: 10.8 MG/DL (ref 8.8–10.2)
CHLORIDE SERPL-SCNC: 100 MMOL/L (ref 96–106)
CHLORIDE SERPL-SCNC: 99 MMOL/L (ref 98–107)
CHOLEST SERPL-MCNC: 155 MG/DL (ref 100–199)
CO2 SERPL-SCNC: 25 MMOL/L (ref 20–29)
CO2 SERPL-SCNC: 32 MMOL/L (ref 22–29)
CREAT SERPL-MCNC: 1.23 MG/DL (ref 0.57–1)
CREAT SERPL-MCNC: 1.32 MG/DL (ref 0.5–0.9)
DIFFERENTIAL METHOD BLD: NORMAL
EGFR: 43 ML/MIN/1.73
EOSINOPHIL # BLD AUTO: 0.3 X10E3/UL (ref 0–0.4)
EOSINOPHIL # BLD: 0.4 K/UL (ref 0–0.4)
EOSINOPHIL NFR BLD AUTO: 5 %
EOSINOPHIL NFR BLD: 5 % (ref 0–7)
ERYTHROCYTE [DISTWIDTH] IN BLOOD BY AUTOMATED COUNT: 12.6 % (ref 11.7–15.4)
ERYTHROCYTE [DISTWIDTH] IN BLOOD BY AUTOMATED COUNT: 12.7 % (ref 11.5–14.5)
EST. AVERAGE GLUCOSE BLD GHB EST-MCNC: 134 MG/DL
GLOBULIN SER CALC-MCNC: 2.1 G/DL (ref 1.5–4.5)
GLOBULIN SER CALC-MCNC: 2.8 G/DL (ref 2–4)
GLUCOSE SERPL-MCNC: 118 MG/DL (ref 65–100)
GLUCOSE SERPL-MCNC: 141 MG/DL (ref 70–99)
HBA1C MFR BLD: 6.3 % (ref 4.8–5.6)
HCT VFR BLD AUTO: 38 % (ref 34–46.6)
HCT VFR BLD AUTO: 38.9 % (ref 35–47)
HDLC SERPL-MCNC: 43 MG/DL
HGB BLD-MCNC: 12.7 G/DL (ref 11.1–15.9)
HGB BLD-MCNC: 12.8 G/DL (ref 11.5–16)
IMM GRANULOCYTES # BLD AUTO: 0 K/UL (ref 0–0.04)
IMM GRANULOCYTES # BLD AUTO: 0 X10E3/UL (ref 0–0.1)
IMM GRANULOCYTES NFR BLD AUTO: 0 %
IMM GRANULOCYTES NFR BLD AUTO: 0 % (ref 0–0.5)
LDLC SERPL CALC-MCNC: 73 MG/DL (ref 0–99)
LIPASE SERPL-CCNC: 51 U/L (ref 13–60)
LYMPHOCYTES # BLD AUTO: 1.6 X10E3/UL (ref 0.7–3.1)
LYMPHOCYTES # BLD: 1.9 K/UL (ref 0.8–3.5)
LYMPHOCYTES NFR BLD AUTO: 24 %
LYMPHOCYTES NFR BLD: 26 % (ref 12–49)
MCH RBC QN AUTO: 32.1 PG (ref 26.6–33)
MCH RBC QN AUTO: 32.3 PG (ref 26–34)
MCHC RBC AUTO-ENTMCNC: 32.9 G/DL (ref 30–36.5)
MCHC RBC AUTO-ENTMCNC: 33.4 G/DL (ref 31.5–35.7)
MCV RBC AUTO: 96 FL (ref 79–97)
MCV RBC AUTO: 98.2 FL (ref 80–99)
MONOCYTES # BLD AUTO: 0.8 X10E3/UL (ref 0.1–0.9)
MONOCYTES # BLD: 0.9 K/UL (ref 0–1)
MONOCYTES NFR BLD AUTO: 13 %
MONOCYTES NFR BLD: 12 % (ref 5–13)
NEUTROPHILS # BLD AUTO: 3.8 X10E3/UL (ref 1.4–7)
NEUTROPHILS NFR BLD AUTO: 57 %
NEUTS SEG # BLD: 4 K/UL (ref 1.8–8)
NEUTS SEG NFR BLD: 56 % (ref 32–75)
NRBC # BLD: 0 K/UL (ref 0–0.01)
NRBC BLD-RTO: 0 PER 100 WBC
PLATELET # BLD AUTO: 209 K/UL (ref 150–400)
PLATELET # BLD AUTO: 220 X10E3/UL (ref 150–450)
PMV BLD AUTO: 10.2 FL (ref 8.9–12.9)
POTASSIUM SERPL-SCNC: 4.7 MMOL/L (ref 3.5–5.2)
POTASSIUM SERPL-SCNC: 4.9 MMOL/L (ref 3.5–5.1)
PROT SERPL-MCNC: 6.4 G/DL (ref 6–8.5)
PROT SERPL-MCNC: 7.1 G/DL (ref 6.4–8.3)
RBC # BLD AUTO: 3.96 M/UL (ref 3.8–5.2)
RBC # BLD AUTO: 3.96 X10E6/UL (ref 3.77–5.28)
SODIUM SERPL-SCNC: 140 MMOL/L (ref 134–144)
SODIUM SERPL-SCNC: 140 MMOL/L (ref 136–145)
T4 FREE SERPL-MCNC: 1.11 NG/DL (ref 0.82–1.77)
TRIGL SERPL-MCNC: 241 MG/DL (ref 0–149)
TSH SERPL DL<=0.005 MIU/L-ACNC: 4.58 UIU/ML (ref 0.45–4.5)
VLDLC SERPL CALC-MCNC: 39 MG/DL (ref 5–40)
WBC # BLD AUTO: 6.5 X10E3/UL (ref 3.4–10.8)
WBC # BLD AUTO: 7.3 K/UL (ref 3.6–11)

## 2022-11-10 PROCEDURE — 74176 CT ABD & PELVIS W/O CONTRAST: CPT

## 2022-11-10 PROCEDURE — 96375 TX/PRO/DX INJ NEW DRUG ADDON: CPT

## 2022-11-10 PROCEDURE — 36415 COLL VENOUS BLD VENIPUNCTURE: CPT

## 2022-11-10 PROCEDURE — 96374 THER/PROPH/DIAG INJ IV PUSH: CPT

## 2022-11-10 PROCEDURE — 80053 COMPREHEN METABOLIC PANEL: CPT

## 2022-11-10 PROCEDURE — 74011250636 HC RX REV CODE- 250/636: Performed by: EMERGENCY MEDICINE

## 2022-11-10 PROCEDURE — 83690 ASSAY OF LIPASE: CPT

## 2022-11-10 PROCEDURE — 99284 EMERGENCY DEPT VISIT MOD MDM: CPT

## 2022-11-10 PROCEDURE — 85025 COMPLETE CBC W/AUTO DIFF WBC: CPT

## 2022-11-10 RX ORDER — FAMOTIDINE 20 MG/1
20 TABLET, FILM COATED ORAL 2 TIMES DAILY
Qty: 14 TABLET | Refills: 0 | Status: SHIPPED | OUTPATIENT
Start: 2022-11-10 | End: 2022-11-17

## 2022-11-10 RX ORDER — DICYCLOMINE HYDROCHLORIDE 20 MG/1
20 TABLET ORAL
Qty: 30 TABLET | Refills: 0 | Status: SHIPPED | OUTPATIENT
Start: 2022-11-10

## 2022-11-10 RX ORDER — MORPHINE SULFATE 2 MG/ML
2 INJECTION, SOLUTION INTRAMUSCULAR; INTRAVENOUS
Status: COMPLETED | OUTPATIENT
Start: 2022-11-10 | End: 2022-11-10

## 2022-11-10 RX ORDER — ACETAMINOPHEN 500 MG
1000 TABLET ORAL 3 TIMES DAILY
Qty: 24 TABLET | Refills: 0 | Status: SHIPPED | OUTPATIENT
Start: 2022-11-10 | End: 2022-11-15

## 2022-11-10 RX ORDER — ONDANSETRON 2 MG/ML
4 INJECTION INTRAMUSCULAR; INTRAVENOUS
Status: COMPLETED | OUTPATIENT
Start: 2022-11-10 | End: 2022-11-10

## 2022-11-10 RX ORDER — ONDANSETRON 4 MG/1
4 TABLET, ORALLY DISINTEGRATING ORAL
Qty: 30 TABLET | Refills: 0 | Status: SHIPPED | OUTPATIENT
Start: 2022-11-10 | End: 2022-11-15 | Stop reason: ALTCHOICE

## 2022-11-10 RX ADMIN — MORPHINE SULFATE 2 MG: 2 INJECTION, SOLUTION INTRAMUSCULAR; INTRAVENOUS at 02:24

## 2022-11-10 RX ADMIN — SODIUM CHLORIDE 1000 ML: 9 INJECTION, SOLUTION INTRAVENOUS at 02:24

## 2022-11-10 RX ADMIN — ONDANSETRON 4 MG: 2 INJECTION INTRAMUSCULAR; INTRAVENOUS at 02:24

## 2022-11-10 NOTE — ED PROVIDER NOTES
78-year-old female presenting ER with complaint of abdominal pain for the last 4 days that has increasingly worsening. Reporting some worsening abdominal bloating and distention in the upper abdomen. Having nausea without vomiting. Reports having normal bowel movement yesterday and still passing gas. Has history of hernia repair most recently by Dr. Antwan Vera. Denies any fevers or chills. Reports pain worsened after eating and worsening nausea. Past Medical History:   Diagnosis Date    Acute blood loss anemia 2/8/2021    Anxiety     Cancer (HCC)     Skin of face. Cataracts, bilateral     Chronic back pain     x1 yr;seeing Chiropractor for a year    Diabetes ([facility] [address]     Diverticulitis 6/20/2022    Dizziness of unknown cause 3/10/2016    Fall 6/20/2022    GERD (gastroesophageal reflux disease)     Gout     Hypercholesterolemia     Hypertension     Hypothyroidism     Migraine     Shingles        Past Surgical History:   Procedure Laterality Date    COLONOSCOPY N/A 2/9/2021    COLONOSCOPY performed by Lyric Bobo MD at [facility] ENDOSCOPY    HX CATARACT REMOVAL Bilateral 05/2017    HX CHOLECYSTECTOMY      Open cholecystectomy. HX COLONOSCOPY  06/07/2012    HX COLONOSCOPY  05/30/2010    HX DILATION AND CURETTAGE      HX DILATION AND CURETTAGE      HX ENDOSCOPY  06/07/2012    Ulcers in Antrum     HX HERNIA REPAIR      Umbilical herniorrhaphy with mesh.     HX OTHER SURGICAL  02/2021    blood transfusion    HX TONSILLECTOMY      HX TONSILLECTOMY           Family History:   Problem Relation Age of Onset    Diabetes Mother     Alzheimer's Disease Mother     Other Mother         Poliosis    Heart Attack Father        Social History     Socioeconomic History    Marital status:      Spouse name: Not on file    Number of children: Not on file    Years of education: Not on file    Highest education level: Not on file   Occupational History    Not on file   Tobacco Use    Smoking status: Never    Smokeless tobacco: Never   Substance and Sexual Activity    Alcohol use: No    Drug use: No    Sexual activity: Not on file   Other Topics Concern    Not on file   Social History Narrative    ** Merged History Encounter **          Social Determinants of Health     Financial Resource Strain: Low Risk     Difficulty of Paying Living Expenses: Not hard at all   Food Insecurity: No Food Insecurity    Worried About Running Out of Food in the Last Year: Never true    Ran Out of Food in the Last Year: Never true   Transportation Needs: Not on file   Physical Activity: Not on file   Stress: Not on file   Social Connections: Not on file   Intimate Partner Violence: Not on file   Housing Stability: Not on file         ALLERGIES: Shellfish derived    Review of Systems   Constitutional:  Negative for chills and fever. HENT:  Negative for congestion and sore throat. Eyes:  Negative for pain. Respiratory:  Negative for shortness of breath. Cardiovascular:  Negative for chest pain. Gastrointestinal:  Positive for abdominal distention, abdominal pain and nausea. Negative for constipation, diarrhea and vomiting. Genitourinary:  Negative for dysuria and flank pain. Musculoskeletal:  Negative for back pain and neck pain. Skin:  Negative for rash. Neurological:  Negative for dizziness and headaches. All other systems reviewed and are negative. Vitals:    11/10/22 0200   BP: (!) 161/75   Pulse: 73   Resp: 18   Temp: 99.3 °F (37.4 °C)   SpO2: 96%   Weight: 75 kg (165 lb 5.5 oz)   Height: 5' 1\" (1.549 m)            Physical Exam  Vitals and nursing note reviewed. Constitutional:       Appearance: She is well-developed. HENT:      Head: Normocephalic. Eyes:      Conjunctiva/sclera: Conjunctivae normal.   Cardiovascular:      Rate and Rhythm: Normal rate and regular rhythm. Pulmonary:      Effort: Pulmonary effort is normal. No respiratory distress. Breath sounds: Normal breath sounds.    Abdominal:      General: Bowel sounds are normal. There is distension. Palpations: Abdomen is soft. Tenderness: There is abdominal tenderness in the epigastric area and periumbilical area. There is no right CVA tenderness, left CVA tenderness, guarding or rebound. Negative signs include Kwok's sign and McBurney's sign. Comments: Upper abdomen is distended    Musculoskeletal:         General: Normal range of motion. Cervical back: Normal range of motion and neck supple. Skin:     General: Skin is warm. Capillary Refill: Capillary refill takes less than 2 seconds. Findings: No rash. Neurological:      Mental Status: She is alert and oriented to person, place, and time. Comments: No gross motor or sensory deficits        MDM  Number of Diagnoses or Management Options  Abdominal pain, generalized  Nausea without vomiting  Diagnosis management comments: Is aPatient with reported generalized abdominal pain abdomen appears slightly distended however this could be patient's baseline body habitus. CAT scan with no acute findings. No leukocytosis normal labs and electrolytes normal LFTs. Patient not have any urinary symptoms. Discussed symptomatic treatment. After I discussed patient's CAT scan findings she reports that she has history of stomach spasms. Discussed the discharge impression and any labs and the results with the patient. Answered any questions and addressed any concerns. Discussed the importance of following up with their primary care provider and/or specialist.  Discussed signs or symptoms that would warrant return back to the ER for further evaluation. The patient is agreeable with discharge.          Amount and/or Complexity of Data Reviewed  Clinical lab tests: reviewed  Tests in the radiology section of CPT®: reviewed  Decide to obtain previous medical records or to obtain history from someone other than the patient: yes           Procedures        Recent Results (from the past 24 hour(s)) CBC WITH AUTOMATED DIFF    Collection Time: 11/10/22  2:08 AM   Result Value Ref Range    WBC 7.3 3.6 - 11.0 K/uL    RBC 3.96 3.80 - 5.20 M/uL    HGB 12.8 11.5 - 16.0 g/dL    HCT 38.9 35.0 - 47.0 %    MCV 98.2 80.0 - 99.0 FL    MCH 32.3 26.0 - 34.0 PG    MCHC 32.9 30.0 - 36.5 g/dL    RDW 12.7 11.5 - 14.5 %    PLATELET 673 090 - 073 K/uL    MPV 10.2 8.9 - 12.9 FL    NRBC 0.0 0  WBC    ABSOLUTE NRBC 0.00 0.00 - 0.01 K/uL    NEUTROPHILS 56 32 - 75 %    LYMPHOCYTES 26 12 - 49 %    MONOCYTES 12 5 - 13 %    EOSINOPHILS 5 0 - 7 %    BASOPHILS 1 0 - 1 %    IMMATURE GRANULOCYTES 0 0.0 - 0.5 %    ABS. NEUTROPHILS 4.0 1.8 - 8.0 K/UL    ABS. LYMPHOCYTES 1.9 0.8 - 3.5 K/UL    ABS. MONOCYTES 0.9 0.0 - 1.0 K/UL    ABS. EOSINOPHILS 0.4 0.0 - 0.4 K/UL    ABS. BASOPHILS 0.1 0.0 - 0.1 K/UL    ABS. IMM. GRANS. 0.0 0.00 - 0.04 K/UL    DF AUTOMATED     METABOLIC PANEL, COMPREHENSIVE    Collection Time: 11/10/22  2:08 AM   Result Value Ref Range    Sodium 140 136 - 145 mmol/L    Potassium 4.9 3.5 - 5.1 mmol/L    Chloride 99 98 - 107 mmol/L    CO2 32 (H) 22 - 29 mmol/L    Anion gap 9 5 - 15 mmol/L    Glucose 118 (H) 65 - 100 mg/dL    BUN 29 (H) 8 - 23 MG/DL    Creatinine 1.32 (H) 0.50 - 0.90 MG/DL    BUN/Creatinine ratio 22 (H) 12 - 20      eGFR 40 (L) >60 ml/min/1.73m2    Calcium 10.8 (H) 8.8 - 10.2 MG/DL    Bilirubin, total 0.3 0.2 - 1.0 MG/DL    ALT (SGPT) 19 10 - 35 U/L    AST (SGOT) 25 10 - 35 U/L    Alk. phosphatase 68 35 - 104 U/L    Protein, total 7.1 6.4 - 8.3 g/dL    Albumin 4.3 3.5 - 5.2 g/dL    Globulin 2.8 2.0 - 4.0 g/dL    A-G Ratio 1.5 1.1 - 2.2     LIPASE    Collection Time: 11/10/22  2:08 AM   Result Value Ref Range    Lipase 51 13 - 60 U/L       CT ABD PELV WO CONT    Result Date: 11/10/2022  EXAM: CT ABD PELV WO CONT INDICATION: abd pain COMPARISON: 6/7/2022 IV CONTRAST: None. ORAL CONTRAST: None TECHNIQUE: Thin axial images were obtained through the abdomen and pelvis.  Coronal and sagittal reformats were generated. CT dose reduction was achieved through use of a standardized protocol tailored for this examination and automatic exposure control for dose modulation. The absence of intravenous contrast material reduces the sensitivity for evaluation of the vasculature and solid organs. FINDINGS: LOWER THORAX: No significant abnormality in the incidentally imaged lower chest. LIVER: No mass. BILIARY TREE: Gallbladder is within normal limits. CBD is not dilated. SPLEEN: within normal limits. PANCREAS: No focal abnormality. ADRENALS: Unremarkable. KIDNEYS/URETERS: No calculus or hydronephrosis. STOMACH: Unremarkable. SMALL BOWEL: No dilatation or wall thickening. COLON: Colonic diverticulosis APPENDIX: Unremarkable PERITONEUM: No ascites or pneumoperitoneum. RETROPERITONEUM: No lymphadenopathy or aortic aneurysm. REPRODUCTIVE ORGANS: Unremarkable URINARY BLADDER: No mass or calculus. BONES: Scoliosis and degenerative changes in the lumbar spine. ABDOMINAL WALL: No mass or hernia. ADDITIONAL COMMENTS: N/A     No acute intra-abdominal pathology.

## 2022-11-10 NOTE — ED TRIAGE NOTES
Patient BIBA from home for abdominal pain x 4 days. Patient states the pain comes on after eating dinner, but no other meals. Patient was seen at her PCP yesterday and the provider suspected her known hernia. Patient denies nausea or vomiting, last BM was yesterday. Patient's son stated that her upper abdomen was distended.

## 2022-11-10 NOTE — PROGRESS NOTES
One of your liver labs is below baseline but this is improved from your last set of labs. Your A1c is stable at 6.3. It was 6.2 6 months ago. Since it is stable I am not inclined to make any changes in your diabetic meds at this time. Your triglycerides are elevated and this does not seem to be a new thing for you. I am not making any changes in your medications since you are in the hospital at this time. Your other labs are basically normal.  Some values may be minimally outside the  \"normal\" range but are not harmful or clinically significant. Please contact the office if you have questions or concerns. I will review your hospital labs and we will recheck all your labs in the office in 6 months.

## 2022-11-15 ENCOUNTER — VIRTUAL VISIT (OUTPATIENT)
Dept: FAMILY MEDICINE CLINIC | Age: 82
End: 2022-11-15
Payer: MEDICARE

## 2022-11-15 DIAGNOSIS — R11.0 NAUSEA: ICD-10-CM

## 2022-11-15 DIAGNOSIS — Z09 HOSPITAL DISCHARGE FOLLOW-UP: Primary | ICD-10-CM

## 2022-11-15 DIAGNOSIS — R10.84 GENERALIZED ABDOMINAL PAIN: ICD-10-CM

## 2022-11-15 PROCEDURE — G8432 DEP SCR NOT DOC, RNG: HCPCS | Performed by: NURSE PRACTITIONER

## 2022-11-15 PROCEDURE — G8399 PT W/DXA RESULTS DOCUMENT: HCPCS | Performed by: NURSE PRACTITIONER

## 2022-11-15 PROCEDURE — G8756 NO BP MEASURE DOC: HCPCS | Performed by: NURSE PRACTITIONER

## 2022-11-15 PROCEDURE — G8427 DOCREV CUR MEDS BY ELIG CLIN: HCPCS | Performed by: NURSE PRACTITIONER

## 2022-11-15 PROCEDURE — 1090F PRES/ABSN URINE INCON ASSESS: CPT | Performed by: NURSE PRACTITIONER

## 2022-11-15 PROCEDURE — G8417 CALC BMI ABV UP PARAM F/U: HCPCS | Performed by: NURSE PRACTITIONER

## 2022-11-15 PROCEDURE — 1123F ACP DISCUSS/DSCN MKR DOCD: CPT | Performed by: NURSE PRACTITIONER

## 2022-11-15 PROCEDURE — G8536 NO DOC ELDER MAL SCRN: HCPCS | Performed by: NURSE PRACTITIONER

## 2022-11-15 PROCEDURE — 99214 OFFICE O/P EST MOD 30 MIN: CPT | Performed by: NURSE PRACTITIONER

## 2022-11-15 PROCEDURE — 1101F PT FALLS ASSESS-DOCD LE1/YR: CPT | Performed by: NURSE PRACTITIONER

## 2022-11-15 NOTE — PROGRESS NOTES
Tawny Toledo (: 1940) is a 78 y.o. female, established patient, here for evaluation of the following chief complaint(s):   ED Follow-up (11/10/2022 for abdominal pain and nausea went to free standing ED on route 1)       ASSESSMENT/PLAN:  Below is the assessment and plan developed based on review of pertinent history, labs, studies, and medications. 1. Hospital discharge follow-up  Patient presents for follow-up where there were no acute processes noted in the ED. She was given Tylenol and that seems to have helped her discomfort. We will continue with this plan  2. Generalized abdominal pain  Patient given Tylenol at ED for abdominal pain and patient states that it has been helping. We will continue with this regimen at the present dosage. 3. Nausea  Patient states no nausea at this time and she will let me know if this symptom exacerbates. SUBJECTIVE/OBJECTIVE:  78-year-old female presents at the ER in [place] on 2022 for abdominal pain and nausea. Her complaints are as stated in the EMR ER note:  \"Patient BIBA from home for abdominal pain x 4 days. Patient states the pain comes on after eating dinner, but no other meals. Patient was seen at her PCP yesterday and the provider suspected her known hernia. Patient denies nausea or vomiting, last BM was yesterday. Patient's son stated that her upper abdomen was distended\" a complete ultrasound of the abdomen was performed in the ER with no acute processes noted. ER doctor explained to patient that he did not find anything and did not think it was her hernia and sent her home on Tylenol. Patient states that she has been taking the Tylenol prophylactically prior to dinner and it has really helped. If her stomach is even feeling remotely bad at lunchtime she will take 1 or 2 at that time. Patient has been evaluated in the past by gastroenterology and they also found no acute processes.       Review of Systems   ROS per HPI and past medical history    Patient-Reported Systolic (Top): 588  Patient-Reported Diastolic (Bottom): 65  Patient-Reported Pulse: 70  Patient-Reported Weight: 155lb       Physical Exam  Nursing note reviewed. HENT:      Head: Normocephalic. Neurological:      Mental Status: She is alert. Psychiatric:         Mood and Affect: Mood normal.         Behavior: Behavior normal.         Thought Content: Thought content normal.         Judgment: Judgment normal.               Amadorrosemary Summers, was evaluated through a synchronous (real-time) audio-video encounter. The patient (or guardian if applicable) is aware that this is a billable service, which includes applicable co-pays. This Virtual Visit was conducted with patient's (and/or legal guardian's) consent. The visit was conducted pursuant to the emergency declaration under the [address] [place] [address] authority and the [employer] Act. Patient identification was verified, and a caregiver was present when appropriate. The patient was located at: Home: [address] [place] [address]  The provider was located at: Home: [unfilled]       An electronic signature was used to authenticate this note.   -- Hyacinth Sinclair NP

## 2022-11-15 NOTE — PROGRESS NOTES
Chief Complaint   Patient presents with    ED Follow-up     11/10/2022 for abdominal pain and nausea went to free standing ED on route [address]     1. \"Have you been to the ER, urgent care clinic since your last visit? Hospitalized since your last visit? \" Yes When: 11/10/2022 Where: [facility] ED on [address] Reason for visit: Abdominal pain    2. \"Have you seen or consulted any other health care providers outside of the [address] [place] [address] since your last visit? \" No     3. For patients aged 39-70: Has the patient had a colonoscopy / FIT/ Cologuard? NA - based on age      If the patient is female:    4. For patients aged 41-77: Has the patient had a mammogram within the past 2 years? NA - based on age or sex      11. For patients aged 21-65: Has the patient had a pap smear?  NA - based on age or sex    3 most recent PHQ Screens 11/15/2022   Little interest or pleasure in doing things Not at all   Feeling down, depressed, irritable, or hopeless Not at all   Total Score PHQ 2 [address] [facility] [phone]

## 2022-12-09 PROBLEM — Z00.00 ENCOUNTER FOR MEDICARE ANNUAL WELLNESS EXAM: Status: RESOLVED | Noted: 2022-11-09 | Resolved: 2022-12-09

## 2022-12-28 RX ORDER — ALENDRONATE SODIUM 70 MG/1
TABLET ORAL
Qty: 12 TABLET | Refills: 1 | Status: SHIPPED | OUTPATIENT
Start: 2022-12-28

## 2023-01-24 ENCOUNTER — APPOINTMENT (OUTPATIENT)
Dept: GENERAL RADIOLOGY | Age: 83
End: 2023-01-24
Attending: STUDENT IN AN ORGANIZED HEALTH CARE EDUCATION/TRAINING PROGRAM
Payer: MEDICARE

## 2023-01-24 ENCOUNTER — HOSPITAL ENCOUNTER (EMERGENCY)
Age: 83
Discharge: HOME | End: 2023-01-24
Attending: STUDENT IN AN ORGANIZED HEALTH CARE EDUCATION/TRAINING PROGRAM
Payer: MEDICARE

## 2023-01-24 VITALS
HEART RATE: 63 BPM | DIASTOLIC BLOOD PRESSURE: 57 MMHG | TEMPERATURE: 97.8 F | WEIGHT: 162.5 LBS | SYSTOLIC BLOOD PRESSURE: 116 MMHG | OXYGEN SATURATION: 96 % | RESPIRATION RATE: 18 BRPM | BODY MASS INDEX: 30.68 KG/M2 | HEIGHT: 61 IN

## 2023-01-24 DIAGNOSIS — N39.0 URINARY TRACT INFECTION WITHOUT HEMATURIA, SITE UNSPECIFIED: Primary | ICD-10-CM

## 2023-01-24 DIAGNOSIS — R68.83 CHILLS: ICD-10-CM

## 2023-01-24 LAB
ALBUMIN SERPL-MCNC: 3.8 G/DL (ref 3.5–5.2)
ALBUMIN/GLOB SERPL: 1.6 (ref 1.1–2.2)
ALP SERPL-CCNC: 65 U/L (ref 35–104)
ALT SERPL-CCNC: 25 U/L (ref 10–35)
ANION GAP SERPL CALC-SCNC: 11 MMOL/L (ref 5–15)
APPEARANCE UR: ABNORMAL
AST SERPL-CCNC: 26 U/L (ref 10–35)
BACTERIA URNS QL MICRO: ABNORMAL /HPF
BASOPHILS # BLD: 0 K/UL (ref 0–1)
BASOPHILS NFR BLD: 1 % (ref 0–1)
BILIRUB SERPL-MCNC: 0.2 MG/DL (ref 0.2–1)
BILIRUB UR QL: NEGATIVE
BUN SERPL-MCNC: 26 MG/DL (ref 8–23)
BUN/CREAT SERPL: 22 (ref 12–20)
CALCIUM SERPL-MCNC: 9.3 MG/DL (ref 8.8–10.2)
CHLORIDE SERPL-SCNC: 103 MMOL/L (ref 98–107)
CO2 SERPL-SCNC: 27 MMOL/L (ref 22–29)
COLOR UR: ABNORMAL
CREAT SERPL-MCNC: 1.18 MG/DL (ref 0.5–0.9)
DIFFERENTIAL METHOD BLD: ABNORMAL
EOSINOPHIL # BLD: 0.3 K/UL (ref 0–0.4)
EOSINOPHIL NFR BLD: 4 %
EPITH CASTS URNS QL MICRO: ABNORMAL /LPF
ERYTHROCYTE [DISTWIDTH] IN BLOOD BY AUTOMATED COUNT: 12.8 % (ref 11.5–14.5)
GLOBULIN SER CALC-MCNC: 2.4 G/DL (ref 2–4)
GLUCOSE SERPL-MCNC: 109 MG/DL (ref 65–100)
GLUCOSE UR STRIP.AUTO-MCNC: NEGATIVE MG/DL
HCT VFR BLD AUTO: 38.6 % (ref 35–47)
HGB BLD-MCNC: 13.1 G/DL (ref 11.5–16)
HGB UR QL STRIP: NEGATIVE
IMM GRANULOCYTES # BLD AUTO: 0 K/UL (ref 0–0.04)
IMM GRANULOCYTES NFR BLD AUTO: 0 % (ref 0–0.5)
KETONES UR QL STRIP.AUTO: ABNORMAL MG/DL
LEUKOCYTE ESTERASE UR QL STRIP.AUTO: ABNORMAL
LYMPHOCYTES # BLD: 2.2 K/UL (ref 0.8–3.5)
LYMPHOCYTES NFR BLD: 30 % (ref 12–49)
MAGNESIUM SERPL-MCNC: 1.8 MG/DL (ref 1.6–2.4)
MCH RBC QN AUTO: 32.6 PG (ref 26–34)
MCHC RBC AUTO-ENTMCNC: 33.9 G/DL (ref 30–36.5)
MCV RBC AUTO: 96 FL (ref 80–99)
MONOCYTES # BLD: 1 K/UL (ref 0–1)
MONOCYTES NFR BLD: 13 % (ref 5–13)
NEUTS SEG # BLD: 3.7 K/UL (ref 1.8–8)
NEUTS SEG NFR BLD: 52 % (ref 32–75)
NITRITE UR QL STRIP.AUTO: POSITIVE
NRBC # BLD: 0 K/UL (ref 0–0.01)
NRBC BLD-RTO: 0 PER 100 WBC
PH UR STRIP: 5.5 (ref 5–8)
PLATELET # BLD AUTO: 199 K/UL (ref 150–400)
PMV BLD AUTO: 10.5 FL (ref 8.9–12.9)
POTASSIUM SERPL-SCNC: 4.7 MMOL/L (ref 3.5–5.1)
PROT SERPL-MCNC: 6.2 G/DL (ref 6.4–8.3)
PROT UR STRIP-MCNC: ABNORMAL MG/DL
RBC # BLD AUTO: 4.02 M/UL (ref 3.8–5.2)
RBC #/AREA URNS HPF: ABNORMAL /HPF
SODIUM SERPL-SCNC: 141 MMOL/L (ref 136–145)
SP GR UR REFRACTOMETRY: 1.02 (ref 1–1.03)
UR CULT HOLD, URHOLD: NORMAL
UROBILINOGEN UR QL STRIP.AUTO: 0.2 EU/DL (ref 0.2–1)
WBC # BLD AUTO: 7.3 K/UL (ref 3.6–11)
WBC URNS QL MICRO: ABNORMAL /HPF (ref 0–4)

## 2023-01-24 PROCEDURE — 96374 THER/PROPH/DIAG INJ IV PUSH: CPT

## 2023-01-24 PROCEDURE — 87086 URINE CULTURE/COLONY COUNT: CPT

## 2023-01-24 PROCEDURE — 99284 EMERGENCY DEPT VISIT MOD MDM: CPT

## 2023-01-24 PROCEDURE — 74011000250 HC RX REV CODE- 250: Performed by: STUDENT IN AN ORGANIZED HEALTH CARE EDUCATION/TRAINING PROGRAM

## 2023-01-24 PROCEDURE — 74011250636 HC RX REV CODE- 250/636: Performed by: STUDENT IN AN ORGANIZED HEALTH CARE EDUCATION/TRAINING PROGRAM

## 2023-01-24 PROCEDURE — 83735 ASSAY OF MAGNESIUM: CPT

## 2023-01-24 PROCEDURE — 87186 SC STD MICRODIL/AGAR DIL: CPT

## 2023-01-24 PROCEDURE — 36415 COLL VENOUS BLD VENIPUNCTURE: CPT

## 2023-01-24 PROCEDURE — 71046 X-RAY EXAM CHEST 2 VIEWS: CPT

## 2023-01-24 PROCEDURE — 87077 CULTURE AEROBIC IDENTIFY: CPT

## 2023-01-24 PROCEDURE — 81001 URINALYSIS AUTO W/SCOPE: CPT

## 2023-01-24 PROCEDURE — 85025 COMPLETE CBC W/AUTO DIFF WBC: CPT

## 2023-01-24 PROCEDURE — 80053 COMPREHEN METABOLIC PANEL: CPT

## 2023-01-24 RX ORDER — CEFDINIR 300 MG/1
300 CAPSULE ORAL 2 TIMES DAILY
Qty: 14 CAPSULE | Refills: 0 | Status: SHIPPED | OUTPATIENT
Start: 2023-01-24 | End: 2023-01-31

## 2023-01-24 RX ADMIN — CEFTRIAXONE 1 G: 1 INJECTION, POWDER, FOR SOLUTION INTRAMUSCULAR; INTRAVENOUS at 10:23

## 2023-01-24 NOTE — ED TRIAGE NOTES
Patient arrives via EMS with c/o chills beginning last night. Pt states took temp and it read \"188 and low\". Reports intermittent cough. EMS reports temp of 97.4 F, BP: 131/78, Blood glucose 118. Hx of type 2 diabetes. Denies N/V/D, chest pain, SOB.

## 2023-01-24 NOTE — ED PROVIDER NOTES
HPI     Date of Service:  1/24/2023    Patient:  Kip Ackerman    Chief Complaint:  Chills       HPI:  Kip Ackerman is a 78 y.o.  female with a past medical history of diabetes, hypertension, hyperlipidemia who presents for evaluation of chills. Patient notes last night she was feeling cold and having the chills. She denies any recent illness including fevers, cough, congestion, nausea or vomiting. No diarrhea. Denies any pain complaints. No difficulty breathing. Denies dysuria or urinary frequency. Past Medical History:   Diagnosis Date    Acute blood loss anemia 2/8/2021    Anxiety     Cancer (HCC)     Skin of face. Cataracts, bilateral     Chronic back pain     x1 yr;seeing Chiropractor for a year    Diabetes ([facility] [address]     Diverticulitis 6/20/2022    Dizziness of unknown cause 3/10/2016    Fall 6/20/2022    GERD (gastroesophageal reflux disease)     Gout     Hypercholesterolemia     Hypertension     Hypothyroidism     Migraine     Shingles        Past Surgical History:   Procedure Laterality Date    COLONOSCOPY N/A 2/9/2021    COLONOSCOPY performed by Sunshine Lebron MD at [facility]    HX CATARACT REMOVAL Bilateral 05/2017    HX CHOLECYSTECTOMY      Open cholecystectomy. HX COLONOSCOPY  06/07/2012    HX COLONOSCOPY  05/30/2010    HX DILATION AND CURETTAGE      HX DILATION AND CURETTAGE      HX ENDOSCOPY  06/07/2012    Ulcers in Antrum     HX HERNIA REPAIR      Umbilical herniorrhaphy with mesh.     HX OTHER SURGICAL  02/2021    blood transfusion    HX TONSILLECTOMY      HX TONSILLECTOMY           Family History:   Problem Relation Age of Onset    Diabetes Mother     Alzheimer's Disease Mother     Other Mother         Poliosis    Heart Attack Father        Social History     Socioeconomic History    Marital status:      Spouse name: Not on file    Number of children: Not on file    Years of education: Not on file    Highest education level: Not on file   Occupational History    Not on file Tobacco Use    Smoking status: Never    Smokeless tobacco: Never   Substance and Sexual Activity    Alcohol use: No    Drug use: No    Sexual activity: Not on file   Other Topics Concern    Not on file   Social History Narrative    ** Merged History Encounter **          Social Determinants of Health     Financial Resource Strain: Low Risk     Difficulty of Paying Living Expenses: Not hard at all   Food Insecurity: No Food Insecurity    Worried About Running Out of Food in the Last Year: Never true    Ran Out of Food in the Last Year: Never true   Transportation Needs: Not on file   Physical Activity: Not on file   Stress: Not on file   Social Connections: Not on file   Intimate Partner Violence: Not on file   Housing Stability: Not on file         ALLERGIES: Shellfish derived    Review of Systems    Vitals:    01/24/23 0835 01/24/23 0838   BP: (!) 143/69    Pulse: 64    Resp: 18    Temp: 98.1 °F (36.7 °C)    SpO2: 99% 99%   Weight: 73.7 kg (162 lb 8 oz)    Height: 5' 1\" (1.549 m)             Physical Exam  Vitals and nursing note reviewed. Constitutional:       General: She is not in acute distress. Appearance: Normal appearance. She is obese. She is not ill-appearing or toxic-appearing. HENT:      Head: Normocephalic and atraumatic. Eyes:      General: No scleral icterus. Extraocular Movements: Extraocular movements intact. Conjunctiva/sclera: Conjunctivae normal.   Cardiovascular:      Rate and Rhythm: Normal rate and regular rhythm. Pulses: Normal pulses. Pulmonary:      Effort: Pulmonary effort is normal. No respiratory distress. Breath sounds: Normal breath sounds. Abdominal:      Palpations: Abdomen is soft. Tenderness: There is no abdominal tenderness. There is no guarding or rebound. Musculoskeletal:         General: Normal range of motion. Right lower leg: No edema. Left lower leg: No edema. Skin:     General: Skin is warm and dry.       Capillary Refill: Capillary refill takes less than 2 seconds. Neurological:      General: No focal deficit present. Mental Status: She is alert and oriented to person, place, and time. Psychiatric:         Mood and Affect: Mood normal.         Behavior: Behavior normal.        Medical Decision Making      DECISION MAKING:  Francine Schlatter is a 78 y.o. female who comes in as above. On arrival patient is afebrile. Vital signs are stable. On my examination she is well-appearing, nontoxic. No appreciated chills or rigors on exam.  Abdomen soft and nontender. Exam otherwise unremarkable. Differential diagnosis includes, but not limited to, pneumonia, UTI, viral illness, anemia. CBC without leukocytosis or anemia. Electrolytes are stable. BUN and creatinine are elevated at 26 and 1.18, respectively; this is consistent with her baseline kidney function. LFTs within normal limits. Chest x-ray is negative for any acute cardiopulmonary process including pneumonia. UA does show positive nitrites with small leukocyte esterase, 20-50 WBCs and 3+ bacteria. Urine will be sent for culture and will treat for UTI. Given patient is well-appearing, tolerating oral intake, no signs/symptoms of pyelonephritis and does not meet sepsis criteria I feel he does not require inpatient admission and she can be managed as an outpatient for her UTI. On reevaluation, patient continues to be well-appearing. I discussed results with patient. She will be given a dose of ceftriaxone in the emergency department and a prescription for 7-day course of cefdinir sent to pharmacy. Patient was instructed on follow-up needs and given strict ER return precautions. She verbalized understanding and will be discharged home. Amount and/or Complexity of Data Reviewed  Labs: ordered. Decision-making details documented in ED Course. Radiology: ordered. Decision-making details documented in ED Course.     Risk  Prescription drug management. Procedures    LABS:  Recent Results (from the past 6 hour(s))   METABOLIC PANEL, COMPREHENSIVE    Collection Time: 01/24/23  8:55 AM   Result Value Ref Range    Sodium 141 136 - 145 mmol/L    Potassium 4.7 3.5 - 5.1 mmol/L    Chloride 103 98 - 107 mmol/L    CO2 27 22 - 29 mmol/L    Anion gap 11 5 - 15 mmol/L    Glucose 109 (H) 65 - 100 mg/dL    BUN 26 (H) 8 - 23 MG/DL    Creatinine 1.18 (H) 0.50 - 0.90 MG/DL    BUN/Creatinine ratio 22 (H) 12 - 20      eGFR 46 (L) >60 ml/min/1.73m2    Calcium 9.3 8.8 - 10.2 MG/DL    Bilirubin, total 0.2 0.2 - 1.0 MG/DL    ALT (SGPT) 25 10 - 35 U/L    AST (SGOT) 26 10 - 35 U/L    Alk. phosphatase 65 35 - 104 U/L    Protein, total 6.2 (L) 6.4 - 8.3 g/dL    Albumin 3.8 3.5 - 5.2 g/dL    Globulin 2.4 2.0 - 4.0 g/dL    A-G Ratio 1.6 1.1 - 2.2     MAGNESIUM    Collection Time: 01/24/23  8:55 AM   Result Value Ref Range    Magnesium 1.8 1.6 - 2.4 mg/dL   URINALYSIS W/MICROSCOPIC    Collection Time: 01/24/23  9:18 AM   Result Value Ref Range    Color YELLOW/STRAW      Appearance HAZY (A) CLEAR      Specific gravity 1.020 1.003 - 1.030      pH (UA) 5.5 5.0 - 8.0      Protein TRACE (A) NEG mg/dL    Glucose Negative NEG mg/dL    Ketone TRACE (A) NEG mg/dL    Bilirubin Negative NEG      Blood Negative NEG      Urobilinogen 0.2 0.2 - 1.0 EU/dL    Nitrites Positive (A) NEG      Leukocyte Esterase SMALL (A) NEG      WBC 20-50 0 - 4 /hpf    RBC 0-5 /hpf    Epithelial cells FEW FEW /lpf    Bacteria 3+ (A) NEG /hpf   URINE CULTURE HOLD SAMPLE    Collection Time: 01/24/23  9:18 AM    Specimen: Urine   Result Value Ref Range    Urine culture hold        Urine on hold in Microbiology dept for 2 days. If unpreserved urine is submitted, it cannot be used for addtional testing after 24 hours, recollection will be required.    CBC WITH AUTOMATED DIFF    Collection Time: 01/24/23  9:30 AM   Result Value Ref Range    WBC 7.3 3.6 - 11.0 K/uL    RBC 4.02 3.80 - 5.20 M/uL    HGB 13.1 11.5 - 16.0 g/dL    HCT 38.6 35.0 - 47.0 %    MCV 96.0 80.0 - 99.0 FL    MCH 32.6 26.0 - 34.0 PG    MCHC 33.9 30.0 - 36.5 g/dL    RDW 12.8 11.5 - 14.5 %    PLATELET 336 214 - 263 K/uL    MPV 10.5 8.9 - 12.9 FL    NRBC 0.0 0  WBC    ABSOLUTE NRBC 0.00 0.00 - 0.01 K/uL    NEUTROPHILS 52 32 - 75 %    LYMPHOCYTES 30 12 - 49 %    MONOCYTES 13 5 - 13 %    EOSINOPHILS 4 (L) 7 %    BASOPHILS 1 0 - 1 %    IMMATURE GRANULOCYTES 0 0 - 0.5 %    ABS. NEUTROPHILS 3.7 1.8 - 8.0 K/UL    ABS. LYMPHOCYTES 2.2 0.8 - 3.5 K/UL    ABS. MONOCYTES 1.0 0.0 - 1.0 K/UL    ABS. EOSINOPHILS 0.3 0.0 - 0.4 K/UL    ABS. BASOPHILS 0.0 0 - 1 K/UL    ABS. IMM. GRANS. 0.0 0.00 - 0.04 K/UL    DF AUTOMATED          IMAGING:  XR CHEST PA LAT   Final Result   No acute cardiopulmonary disease. Medications During Visit:  Medications   cefTRIAXone (ROCEPHIN) 1 g in 0.9% sodium chloride 10 mL IV syringe (has no administration in time range)         IMPRESSION:  1. Urinary tract infection without hematuria, site unspecified    2. Chills        DISPOSITION:  Discharged      Current Discharge Medication List        START taking these medications    Details   cefdinir (OMNICEF) 300 mg capsule Take 1 Capsule by mouth two (2) times a day for 7 days. Qty: 14 Capsule, Refills: 0  Start date: 1/24/2023, End date: 1/31/2023              Follow-up Information       Follow up With Specialties Details Why Contact Info    Larissa Ndiaye NP Nurse Practitioner Schedule an appointment as soon as possible for a visit   [address]       [facility]GENCY DEPT Emergency Medicine  If symptoms worsen [address]  [phone]              The patient is asked to follow-up with their primary care provider in the next several days. They are to call tomorrow for an appointment. The patient is asked to return promptly for any increased concerns or worsening of symptoms.   They can return to this emergency department or any other emergency department.       Beola Hienard, DO

## 2023-01-26 LAB
BACTERIA SPEC CULT: ABNORMAL
CC UR VC: ABNORMAL
SERVICE CMNT-IMP: ABNORMAL

## 2023-01-26 RX ORDER — CIPROFLOXACIN 250 MG/1
250 TABLET, FILM COATED ORAL EVERY 12 HOURS
Qty: 6 TABLET | Refills: 0 | Status: CANCELLED | OUTPATIENT
Start: 2023-01-26 | End: 2023-01-29

## 2023-01-26 NOTE — PROGRESS NOTES
Attempted to reach patient.  Message left for call back concerning culture result and update on how feeling

## 2023-01-27 NOTE — PROGRESS NOTES
Pt returned call and left message. I returned call this morning and left message for call back concerning urine culture.

## 2023-01-27 NOTE — PROGRESS NOTES
Pt returned call. We discussed urine culture. Pt with no urinary symptoms. No further chills or feeling cold. No fevers. No back pain. Pt did note diarrhea yesterday but none today. We discussed supportive care bland diet and frequent small sips for hydration.   Should diarrhea return today recommended return to ER for reevaluation

## 2023-02-24 ENCOUNTER — APPOINTMENT (OUTPATIENT)
Dept: GENERAL RADIOLOGY | Age: 83
End: 2023-02-24
Attending: STUDENT IN AN ORGANIZED HEALTH CARE EDUCATION/TRAINING PROGRAM
Payer: MEDICARE

## 2023-02-24 ENCOUNTER — HOSPITAL ENCOUNTER (EMERGENCY)
Age: 83
Discharge: HOME | End: 2023-02-24
Attending: STUDENT IN AN ORGANIZED HEALTH CARE EDUCATION/TRAINING PROGRAM
Payer: MEDICARE

## 2023-02-24 VITALS
HEART RATE: 75 BPM | OXYGEN SATURATION: 96 % | BODY MASS INDEX: 30.58 KG/M2 | WEIGHT: 162 LBS | TEMPERATURE: 97.5 F | RESPIRATION RATE: 18 BRPM | SYSTOLIC BLOOD PRESSURE: 144 MMHG | DIASTOLIC BLOOD PRESSURE: 67 MMHG | HEIGHT: 61 IN

## 2023-02-24 DIAGNOSIS — S49.91XA INJURY OF RIGHT SHOULDER, INITIAL ENCOUNTER: Primary | ICD-10-CM

## 2023-02-24 PROCEDURE — 99283 EMERGENCY DEPT VISIT LOW MDM: CPT

## 2023-02-24 PROCEDURE — 73060 X-RAY EXAM OF HUMERUS: CPT

## 2023-02-24 PROCEDURE — 74011000250 HC RX REV CODE- 250: Performed by: STUDENT IN AN ORGANIZED HEALTH CARE EDUCATION/TRAINING PROGRAM

## 2023-02-24 PROCEDURE — 73030 X-RAY EXAM OF SHOULDER: CPT

## 2023-02-24 RX ORDER — LIDOCAINE 4 G/100G
1 PATCH TOPICAL EVERY 24 HOURS
Status: DISCONTINUED | OUTPATIENT
Start: 2023-02-24 | End: 2023-02-24 | Stop reason: HOSPADM

## 2023-02-24 RX ORDER — LIDOCAINE 50 MG/G
PATCH TOPICAL
Qty: 5 EACH | Refills: 0 | Status: SHIPPED | OUTPATIENT
Start: 2023-02-24

## 2023-02-24 NOTE — ED TRIAGE NOTES
Pt arrives w/ c/c of R shoulder pain after fall on 2/6 where she was walking into her front door, had doorknob in R hand, screendoor caught her L foot, and she fell. Denies hitting head/LOC.

## 2023-02-24 NOTE — ED PROVIDER NOTES
HPI     Date of Service:  2/24/2023    Patient:  Sara Prieto    Chief Complaint:  Shoulder Pain       HPI:  Sara Prieto is a 78 y.o.  female with a past medical history of DM, HTN, HLD who presents for evaluation of shoulder injury. Patient reports on 2/6 she tripped walking in her door and fell. Reports she still was holding onto the door with her right hand when she fell. She has been having ongoing right shoulder and upper arm pain since the fall. Reports she is taking Tylenol with minimal relief. She has not had any improvement of her symptoms. She did not get evaluated afterwards. She denies head strike or LOC. No blood thinner use. She denies any other recent illness. Past Medical History:   Diagnosis Date    Acute blood loss anemia 2/8/2021    Anxiety     Cancer (HCC)     Skin of face. Cataracts, bilateral     Chronic back pain     x1 yr;seeing Chiropractor for a year    Diabetes ([facility] [address]     Diverticulitis 6/20/2022    Dizziness of unknown cause 3/10/2016    Fall 6/20/2022    GERD (gastroesophageal reflux disease)     Gout     Hypercholesterolemia     Hypertension     Hypothyroidism     Migraine     Shingles        Past Surgical History:   Procedure Laterality Date    COLONOSCOPY N/A 2/9/2021    COLONOSCOPY performed by Doris Lomeli MD at [facility] ENDOSCOPY    HX CATARACT REMOVAL Bilateral 05/2017    HX CHOLECYSTECTOMY      Open cholecystectomy. HX COLONOSCOPY  06/07/2012    HX COLONOSCOPY  05/30/2010    HX DILATION AND CURETTAGE      HX DILATION AND CURETTAGE      HX ENDOSCOPY  06/07/2012    Ulcers in Antrum     HX HERNIA REPAIR      Umbilical herniorrhaphy with mesh.     HX OTHER SURGICAL  02/2021    blood transfusion    HX TONSILLECTOMY      HX TONSILLECTOMY           Family History:   Problem Relation Age of Onset    Diabetes Mother     Alzheimer's Disease Mother     Other Mother         Poliosis    Heart Attack Father        Social History     Socioeconomic History    Marital status:      Spouse name: Not on file    Number of children: Not on file    Years of education: Not on file    Highest education level: Not on file   Occupational History    Not on file   Tobacco Use    Smoking status: Never    Smokeless tobacco: Never   Substance and Sexual Activity    Alcohol use: No    Drug use: No    Sexual activity: Not on file   Other Topics Concern    Not on file   Social History Narrative    ** Merged History Encounter **          Social Determinants of Health     Financial Resource Strain: Low Risk     Difficulty of Paying Living Expenses: Not hard at all   Food Insecurity: No Food Insecurity    Worried About Running Out of Food in the Last Year: Never true    Ran Out of Food in the Last Year: Never true   Transportation Needs: Not on file   Physical Activity: Not on file   Stress: Not on file   Social Connections: Not on file   Intimate Partner Violence: Not on file   Housing Stability: Not on file         ALLERGIES: Shellfish derived    Review of Systems   Constitutional:  Negative for chills and fever. HENT:  Negative for congestion and rhinorrhea. Eyes:  Negative for discharge and redness. Respiratory:  Negative for cough. Gastrointestinal:  Negative for diarrhea, nausea and vomiting. Musculoskeletal:  Positive for arthralgias. Neurological:  Negative for speech difficulty. Psychiatric/Behavioral:  Negative for agitation and confusion. Vitals:    02/24/23 1522   BP: (!) 144/67   Pulse: 75   Resp: 18   Temp: 97.5 °F (36.4 °C)   SpO2: 96%   Weight: 73.5 kg (162 lb)   Height: 5' 1\" (1.549 m)            Physical Exam  Vitals and nursing note reviewed. Constitutional:       Appearance: Normal appearance. HENT:      Head: Normocephalic. Eyes:      Extraocular Movements: Extraocular movements intact. Conjunctiva/sclera: Conjunctivae normal.   Cardiovascular:      Rate and Rhythm: Normal rate. Pulses: Normal pulses.    Pulmonary:      Effort: Pulmonary effort is normal. No respiratory distress. Abdominal:      General: Abdomen is flat. Palpations: Abdomen is soft. Tenderness: There is no abdominal tenderness. Musculoskeletal:         General: No swelling or deformity. Normal range of motion. Right shoulder: Tenderness present. Normal range of motion. Normal strength. Normal pulse. Right upper arm: Tenderness present. No swelling or deformity. Skin:     General: Skin is warm and dry. Capillary Refill: Capillary refill takes less than 2 seconds. Neurological:      General: No focal deficit present. Mental Status: She is alert and oriented to person, place, and time. Psychiatric:         Mood and Affect: Mood normal.         Behavior: Behavior normal.        Medical Decision Making  Amount and/or Complexity of Data Reviewed  Radiology: ordered. Risk  OTC drugs.            Procedures Result      No fracture. XR HUMERUS RT   Final Result      No fracture. Medications During Visit:  Medications - No data to display      IMPRESSION:  1. Injury of right shoulder, initial encounter        DISPOSITION:  Discharged      Discharge Medication List as of 2/24/2023  4:58 PM        START taking these medications    Details   lidocaine (Lidoderm) 5 % Apply patch to the affected area for 12 hours a day and remove for 12 hours a day., Normal, Disp-5 Each, R-0           CONTINUE these medications which have NOT CHANGED    Details   alendronate (FOSAMAX) 70 mg tablet take 1 tablet by mouth every week, Normal, Disp-12 Tablet, R-1      dicyclomine (BENTYL) 20 mg tablet Take 1 Tablet by mouth every six (6) hours as needed for Abdominal Cramps., Normal, Disp-30 Tablet, R-0      ammonium lactate (LAC-HYDRIN) 12 % lotion apply to feet daily, Historical Med      latanoprost (XALATAN) 0.005 % ophthalmic solution instill 1 drop into both eyes at bedtime, Historical Med      atenoloL (TENORMIN) 25 mg tablet take 1 tablet by mouth once daily, Normal, Disp-90 Tablet, R-1      lancets misc Use one lancet to check blood sugar once a day, Normal, Disp-100 Each, R-1      True Metrix Glucose Test Strip strip use 1 TEST STRIP to TEST BLOOD SUGAR once daily, Normal, Disp-100 Strip, R-3, KELVIN      !! Januvia 100 mg tablet take 1 tablet by mouth every morning, Normal, Disp-90 Tablet, R-1      simvastatin (ZOCOR) 10 mg tablet take 1 tablet by mouth at bedtime  (DISCONTINUE 20MG DOSE), Normal, Disp-90 Tablet, R-1d/c 20mg dose. Shawn Oquendo      !! SITagliptin (JANUVIA) 50 mg tablet Take 1 Tablet by mouth daily. , Normal, Disp-90 Tablet, R-1      levothyroxine (SYNTHROID) 50 mcg tablet Take 1 Tablet by mouth Daily (before breakfast). , Normal, Disp-90 Tablet, R-1      Veltassa 8.4 gram powder MIX 1 packet and take as directed by mouth daily, Historical Med, KELVIN      allopurinoL (ZYLOPRIM) 100 mg tablet take 2 tablets by mouth once daily, Normal, Disp-180 Tablet, R-1      glipiZIDE (GLUCOTROL) 5 mg tablet take 1 tablet by mouth twice a day, Normal, Disp-180 Tablet, R-1      nicotinic acid (NIACIN) 100 mg tablet Take 1 Tablet by mouth every evening., Normal, Disp-90 Tablet, R-1      aspirin delayed-release 81 mg tablet Take 1 Tab by mouth nightly., No Print, Disp-30 Tab, R-0      omega 3-DHA-EPA-fish oil 1,000 mg (120 mg-180 mg) capsule Take 1 Cap by mouth daily. , Historical Med      co-enzyme Q-10 (CO Q-10) 100 mg capsule Take 100 mg by mouth daily. , Historical Med      multivitamin (ONE A DAY) tablet Take 1 Tab by mouth daily. , Historical Med       !! - Potential duplicate medications found. Please discuss with provider. Follow-up Information       Follow up With Specialties Details Why Contact Info    Henry Cox NP Nurse Practitioner Schedule an appointment as soon as possible for a visit   [facility] [address] [facility] [address] [phone]      [facility] EMERGENCY DEPT Emergency Medicine  If symptoms worsen [address] [address]  [phone]    [facility]  Schedule an appointment as soon as possible for a visit   [address] [place] [address]  [phone]              The patient is asked to follow-up with their primary care provider in the next several days. They are to call tomorrow for an appointment. The patient is asked to return promptly for any increased concerns or worsening of symptoms. They can return to this emergency department or any other emergency department.         Omer Will DO

## 2023-03-15 PROBLEM — G62.9 NEUROPATHY: Status: ACTIVE | Noted: 2023-03-15

## 2023-03-17 ENCOUNTER — VIRTUAL VISIT (OUTPATIENT)
Dept: FAMILY MEDICINE CLINIC | Age: 83
End: 2023-03-17
Payer: MEDICARE

## 2023-03-17 DIAGNOSIS — H81.4 VERTIGO OF CENTRAL ORIGIN: Primary | ICD-10-CM

## 2023-03-17 PROCEDURE — 1101F PT FALLS ASSESS-DOCD LE1/YR: CPT | Performed by: NURSE PRACTITIONER

## 2023-03-17 PROCEDURE — 99214 OFFICE O/P EST MOD 30 MIN: CPT | Performed by: NURSE PRACTITIONER

## 2023-03-17 PROCEDURE — G8399 PT W/DXA RESULTS DOCUMENT: HCPCS | Performed by: NURSE PRACTITIONER

## 2023-03-17 PROCEDURE — 1090F PRES/ABSN URINE INCON ASSESS: CPT | Performed by: NURSE PRACTITIONER

## 2023-03-17 PROCEDURE — G8427 DOCREV CUR MEDS BY ELIG CLIN: HCPCS | Performed by: NURSE PRACTITIONER

## 2023-03-17 PROCEDURE — G8432 DEP SCR NOT DOC, RNG: HCPCS | Performed by: NURSE PRACTITIONER

## 2023-03-17 PROCEDURE — G8536 NO DOC ELDER MAL SCRN: HCPCS | Performed by: NURSE PRACTITIONER

## 2023-03-17 PROCEDURE — 1123F ACP DISCUSS/DSCN MKR DOCD: CPT | Performed by: NURSE PRACTITIONER

## 2023-03-17 PROCEDURE — G8417 CALC BMI ABV UP PARAM F/U: HCPCS | Performed by: NURSE PRACTITIONER

## 2023-03-17 RX ORDER — MECLIZINE HYDROCHLORIDE 25 MG/1
TABLET ORAL
Qty: 60 TABLET | Refills: 1 | Status: SHIPPED | OUTPATIENT
Start: 2023-03-17

## 2023-03-17 RX ORDER — ACETAMINOPHEN 325 MG/1
TABLET ORAL
COMMUNITY

## 2023-03-17 RX ORDER — DIPHENHYDRAMINE HCL 25 MG
25 CAPSULE ORAL
COMMUNITY

## 2023-03-17 NOTE — PROGRESS NOTES
Triston Christina (: 1940) is a 78 y.o. female, established patient, here for evaluation of the following chief complaint(s):   Follow-up (Dizzy on 3/16/23./Weird feeling in head when she lays down and when she sits up. )       ASSESSMENT/PLAN:  Below is the assessment and plan developed based on review of pertinent history, labs, studies, and medications. 1. Vertigo of central origin  We will trial patient on meclizine, 1 tablet in the morning and 1 tablet short before bedtime. She will call me Monday to let me know whether she is better or worse    No follow-ups on file. SUBJECTIVE/OBJECTIVE:  36-hour history of dizziness. She states that when she lays down she gets a funny sensation in her head that lasts about 5 minutes and goes away. She states it is much improved from yesterday but still there. She also gets a strange sensation when she gets up out of bed and her first few steps are very unstable. Patient has stairs and is very concerned about her safety. I have advised her to change positions slowly but she has not found anything since yesterday that is made it better. Review of Systems   ROS per HPI and past medical history    Patient-Reported Systolic (Top): 949  Patient-Reported Diastolic (Bottom): 69  Patient-Reported Pulse: 67  Patient-Reported Weight: 155lbs       Physical Exam  Vitals and nursing note reviewed. HENT:      Head: Normocephalic. Pulmonary:      Effort: Pulmonary effort is normal.   Neurological:      Mental Status: She is alert and oriented to person, place, and time. Psychiatric:         Mood and Affect: Mood normal.         Behavior: Behavior normal.         Thought Content: Thought content normal.         Judgment: Judgment normal.               Triston Christina, was evaluated through a synchronous (real-time) audio-video encounter. The patient (or guardian if applicable) is aware that this is a billable service, which includes applicable co-pays.  This Virtual Visit was conducted with patient's (and/or legal guardian's) consent. The visit was conducted pursuant to the emergency declaration under the [facility], [address] [place] [address] authority and the [employer] Act. Patient identification was verified, and a caregiver was present when appropriate. The patient was located at: Home: [address] [place] [address]  The provider was located at: Home: [id number] was used to authenticate this note.   -- Alonzo Campo NP

## 2023-03-17 NOTE — PROGRESS NOTES
Chief Complaint   Patient presents with    Follow-up     Dizzy on 3/16/23. Weird feeling in head when she lays down and when she sits up. 1. \"Have you been to the ER, urgent care clinic since your last visit? Hospitalized since your last visit? \" Yes When: 2/24/23 Where: [facility] Reason for visit: shoulder pain    2. \"Have you seen or consulted any other health care providers outside of the [address] [place] [address] since your last visit? \" No     3. For patients aged 39-70: Has the patient had a colonoscopy / FIT/ Cologuard? NA - based on age      If the patient is female:    4. For patients aged 41-77: Has the patient had a mammogram within the past 2 years? NA - based on age or sex      11. For patients aged 21-65: Has the patient had a pap smear?  NA - based on age or sex  3 most recent PHQ Screens 3/17/2023   Little interest or pleasure in doing things Not at all   Feeling down, depressed, irritable, or hopeless Not at all   Total Score PHQ 2 0     Please txt link to [phone]

## 2023-03-22 RX ORDER — LEVOTHYROXINE SODIUM 50 UG/1
TABLET ORAL
Qty: 90 TABLET | Refills: 1 | Status: SHIPPED | OUTPATIENT
Start: 2023-03-22

## 2023-04-17 RX ORDER — ATENOLOL 25 MG/1
TABLET ORAL
Qty: 90 TABLET | Refills: 1 | Status: SHIPPED | OUTPATIENT
Start: 2023-04-17

## 2023-05-04 RX ORDER — SIMVASTATIN 10 MG/1
TABLET, FILM COATED ORAL
Qty: 90 TABLET | Refills: 1 | Status: SHIPPED | OUTPATIENT
Start: 2023-05-04

## 2023-05-22 ENCOUNTER — OFFICE VISIT (OUTPATIENT)
Facility: CLINIC | Age: 83
End: 2023-05-22
Payer: MEDICARE

## 2023-05-22 VITALS
BODY MASS INDEX: 29.27 KG/M2 | RESPIRATION RATE: 16 BRPM | OXYGEN SATURATION: 98 % | WEIGHT: 155 LBS | HEART RATE: 67 BPM | TEMPERATURE: 97.6 F | SYSTOLIC BLOOD PRESSURE: 126 MMHG | DIASTOLIC BLOOD PRESSURE: 84 MMHG | HEIGHT: 61 IN

## 2023-05-22 DIAGNOSIS — E78.00 HYPERCHOLESTEREMIA: ICD-10-CM

## 2023-05-22 DIAGNOSIS — Z91.81 AT HIGH RISK FOR FALLS: ICD-10-CM

## 2023-05-22 DIAGNOSIS — R51.9 NONINTRACTABLE HEADACHE, UNSPECIFIED CHRONICITY PATTERN, UNSPECIFIED HEADACHE TYPE: ICD-10-CM

## 2023-05-22 DIAGNOSIS — Z13.228 ENCOUNTER FOR SCREENING FOR OTHER METABOLIC DISORDERS: ICD-10-CM

## 2023-05-22 DIAGNOSIS — G62.9 NEUROPATHY: ICD-10-CM

## 2023-05-22 DIAGNOSIS — Z86.2 HISTORY OF ANEMIA: ICD-10-CM

## 2023-05-22 DIAGNOSIS — K21.9 GASTROESOPHAGEAL REFLUX DISEASE WITHOUT ESOPHAGITIS: ICD-10-CM

## 2023-05-22 DIAGNOSIS — E03.9 HYPOTHYROIDISM, UNSPECIFIED TYPE: ICD-10-CM

## 2023-05-22 DIAGNOSIS — E78.5 HYPERLIPIDEMIA, UNSPECIFIED HYPERLIPIDEMIA TYPE: ICD-10-CM

## 2023-05-22 DIAGNOSIS — I10 HYPERTENSION, UNSPECIFIED TYPE: ICD-10-CM

## 2023-05-22 DIAGNOSIS — E11.22 TYPE 2 DIABETES MELLITUS WITH CHRONIC KIDNEY DISEASE, WITHOUT LONG-TERM CURRENT USE OF INSULIN, UNSPECIFIED CKD STAGE (HCC): Primary | ICD-10-CM

## 2023-05-22 PROCEDURE — G8419 CALC BMI OUT NRM PARAM NOF/U: HCPCS | Performed by: NURSE PRACTITIONER

## 2023-05-22 PROCEDURE — 3079F DIAST BP 80-89 MM HG: CPT | Performed by: NURSE PRACTITIONER

## 2023-05-22 PROCEDURE — 3074F SYST BP LT 130 MM HG: CPT | Performed by: NURSE PRACTITIONER

## 2023-05-22 PROCEDURE — G8399 PT W/DXA RESULTS DOCUMENT: HCPCS | Performed by: NURSE PRACTITIONER

## 2023-05-22 PROCEDURE — G8427 DOCREV CUR MEDS BY ELIG CLIN: HCPCS | Performed by: NURSE PRACTITIONER

## 2023-05-22 PROCEDURE — 99214 OFFICE O/P EST MOD 30 MIN: CPT | Performed by: NURSE PRACTITIONER

## 2023-05-22 PROCEDURE — 1123F ACP DISCUSS/DSCN MKR DOCD: CPT | Performed by: NURSE PRACTITIONER

## 2023-05-22 PROCEDURE — 1036F TOBACCO NON-USER: CPT | Performed by: NURSE PRACTITIONER

## 2023-05-22 PROCEDURE — 1090F PRES/ABSN URINE INCON ASSESS: CPT | Performed by: NURSE PRACTITIONER

## 2023-05-22 RX ORDER — CHLORAL HYDRATE 500 MG
1 CAPSULE ORAL DAILY
COMMUNITY

## 2023-05-22 SDOH — ECONOMIC STABILITY: HOUSING INSECURITY
IN THE LAST 12 MONTHS, WAS THERE A TIME WHEN YOU DID NOT HAVE A STEADY PLACE TO SLEEP OR SLEPT IN A SHELTER (INCLUDING NOW)?: NO

## 2023-05-22 SDOH — ECONOMIC STABILITY: FOOD INSECURITY: WITHIN THE PAST 12 MONTHS, YOU WORRIED THAT YOUR FOOD WOULD RUN OUT BEFORE YOU GOT MONEY TO BUY MORE.: NEVER TRUE

## 2023-05-22 SDOH — ECONOMIC STABILITY: INCOME INSECURITY: HOW HARD IS IT FOR YOU TO PAY FOR THE VERY BASICS LIKE FOOD, HOUSING, MEDICAL CARE, AND HEATING?: NOT HARD AT ALL

## 2023-05-22 SDOH — ECONOMIC STABILITY: FOOD INSECURITY: WITHIN THE PAST 12 MONTHS, THE FOOD YOU BOUGHT JUST DIDN'T LAST AND YOU DIDN'T HAVE MONEY TO GET MORE.: NEVER TRUE

## 2023-05-22 NOTE — PROGRESS NOTES
Chief Complaint   Patient presents with    6 Month Follow-Up     Sharp pain in head had neurologist appt in July does not feel right walking almost fell when getting off scale     No data recorded    1. Have you been to the ER, urgent care clinic since your last visit? Hospitalized since your last visit? No    2. Have you seen or consulted any other health care providers outside of the [address] [place] [address] since your last visit? No     3. For patients aged 39-70: Has the patient had a colonoscopy / FIT/ Cologuard? NA - based on age/sex    If the patient is female:    4. For patients aged 41-77: Has the patient had a mammogram within the past 2 years? NA - based on age/sex      5. For patients aged 21-65: Has the patient had a pap smear? NA - based on age/sex    Pulse 67   Resp 16   Ht 5' 1\" (1.549 m)   Wt 155 lb (70.3 kg)   SpO2 98%   BMI 29.29 kg/m²   Amb Fall Risk Assessment and TUG Test 5/22/2023   Do you feel unsteady or are you worried about falling?  yes   2 or more falls in past year? yes   Fall with injury in past year? no   Fall in past 12 months? -   Able to walk?  -   Total Score -
Vitamins-Minerals (PRESERVISION AREDS 2 PO) Take by mouth      Aspirin-Acetaminophen-Caffeine (EXCEDRIN PO) Take by mouth      Lancets MISC Use one lancet to check blood sugar once a day      acetaminophen (TYLENOL) 325 MG tablet Take by mouth every 4 hours as needed      aspirin 81 MG EC tablet Take 1 tablet by mouth      atenolol (TENORMIN) 25 MG tablet take 1 tablet by mouth once daily      coenzyme Q10 100 MG CAPS capsule Take 1 capsule by mouth daily      diphenhydrAMINE (BENADRYL) 25 MG capsule Take 1 capsule by mouth every 6 hours as needed      glipiZIDE (GLUCOTROL) 5 MG tablet take 1 tablet by mouth twice a day      levothyroxine (SYNTHROID) 50 MCG tablet Take 1 tablet by mouth every morning (before breakfast)      patiromer sorbitex calcium (VELTASSA) 8.4 g PACK packet MIX 1 packet and take as directed by mouth daily      simvastatin (ZOCOR) 10 MG tablet take 1 tablet by mouth at bedtime  (DISCONTINUE 20MG DOSE)      SITagliptin (JANUVIA) 50 MG tablet Take 1 tablet by mouth daily      Multiple Vitamins-Minerals (MULTIVITAMIN ADULT EXTRA C PO) Take 1 tablet by mouth daily      Omega-3 Fatty Acids (FISH OIL) 1000 MG capsule Take 1 capsule by mouth daily      meclizine (ANTIVERT) 25 MG tablet Take one tablet in the morning by mouth and one about 30 minutes before bedtime for dizziness (Patient not taking: Reported on 5/22/2023)      SITagliptin (JANUVIA) 100 MG tablet take 1 tablet by mouth every morning (Patient not taking: Reported on 5/22/2023)       No current facility-administered medications on file prior to visit. Allergies   Allergen Reactions    Shellfish Allergy Other (See Comments)     \"causes my gout to act up, not an allergy\"     Review of Systems  ROS per HPI and PMH      Objective    Physical Exam  Vitals and nursing note reviewed. HENT:      Head: Normocephalic. Cardiovascular:      Rate and Rhythm: Normal rate and regular rhythm.    Pulmonary:      Effort: Pulmonary effort is normal.

## 2023-05-23 LAB
ALBUMIN SERPL-MCNC: 4 G/DL (ref 3.6–4.6)
ALBUMIN/GLOB SERPL: 1.8 {RATIO} (ref 1.2–2.2)
ALP SERPL-CCNC: 62 IU/L (ref 44–121)
ALT SERPL-CCNC: 21 IU/L (ref 0–32)
AST SERPL-CCNC: 19 IU/L (ref 0–40)
BASOPHILS # BLD AUTO: 0 X10E3/UL (ref 0–0.2)
BASOPHILS NFR BLD AUTO: 1 %
BILIRUB SERPL-MCNC: <0.2 MG/DL (ref 0–1.2)
BUN SERPL-MCNC: 29 MG/DL (ref 8–27)
BUN/CREAT SERPL: 24 (ref 12–28)
CALCIUM SERPL-MCNC: 8.8 MG/DL (ref 8.7–10.3)
CHLORIDE SERPL-SCNC: 105 MMOL/L (ref 96–106)
CHOLEST SERPL-MCNC: 129 MG/DL (ref 100–199)
CO2 SERPL-SCNC: 22 MMOL/L (ref 20–29)
COMMENT: NORMAL
CREAT SERPL-MCNC: 1.21 MG/DL (ref 0.57–1)
EGFRCR SERPLBLD CKD-EPI 2021: 44 ML/MIN/1.73
EOSINOPHIL # BLD AUTO: 0.3 X10E3/UL (ref 0–0.4)
EOSINOPHIL NFR BLD AUTO: 5 %
ERYTHROCYTE [DISTWIDTH] IN BLOOD BY AUTOMATED COUNT: 13.1 % (ref 11.7–15.4)
GLOBULIN SER CALC-MCNC: 2.2 G/DL (ref 1.5–4.5)
GLUCOSE SERPL-MCNC: 102 MG/DL (ref 70–99)
HBA1C MFR BLD: 6 % (ref 4.8–5.6)
HCT VFR BLD AUTO: 36.3 % (ref 34–46.6)
HDLC SERPL-MCNC: 38 MG/DL
HGB BLD-MCNC: 12.3 G/DL (ref 11.1–15.9)
IMM GRANULOCYTES # BLD AUTO: 0 X10E3/UL (ref 0–0.1)
IMM GRANULOCYTES NFR BLD AUTO: 0 %
LDLC SERPL CALC-MCNC: 65 MG/DL (ref 0–99)
LYMPHOCYTES # BLD AUTO: 1.9 X10E3/UL (ref 0.7–3.1)
LYMPHOCYTES NFR BLD AUTO: 32 %
MCH RBC QN AUTO: 32.7 PG (ref 26.6–33)
MCHC RBC AUTO-ENTMCNC: 33.9 G/DL (ref 31.5–35.7)
MCV RBC AUTO: 97 FL (ref 79–97)
MONOCYTES # BLD AUTO: 0.7 X10E3/UL (ref 0.1–0.9)
MONOCYTES NFR BLD AUTO: 13 %
NEUTROPHILS # BLD AUTO: 2.9 X10E3/UL (ref 1.4–7)
NEUTROPHILS NFR BLD AUTO: 49 %
PLATELET # BLD AUTO: 229 X10E3/UL (ref 150–450)
POTASSIUM SERPL-SCNC: 4.5 MMOL/L (ref 3.5–5.2)
PROT SERPL-MCNC: 6.2 G/DL (ref 6–8.5)
RBC # BLD AUTO: 3.76 X10E6/UL (ref 3.77–5.28)
SODIUM SERPL-SCNC: 143 MMOL/L (ref 134–144)
T4 FREE SERPL-MCNC: 0.91 NG/DL (ref 0.82–1.77)
THYROPEROXIDASE AB SERPL-ACNC: <9 IU/ML (ref 0–34)
TRIGL SERPL-MCNC: 153 MG/DL (ref 0–149)
TSH SERPL DL<=0.005 MIU/L-ACNC: 5.2 UIU/ML (ref 0.45–4.5)
VLDLC SERPL CALC-MCNC: 26 MG/DL (ref 5–40)
WBC # BLD AUTO: 5.9 X10E3/UL (ref 3.4–10.8)

## 2023-07-26 ENCOUNTER — OFFICE VISIT (OUTPATIENT)
Age: 83
End: 2023-07-26
Payer: MEDICARE

## 2023-07-26 VITALS
BODY MASS INDEX: 29.29 KG/M2 | OXYGEN SATURATION: 98 % | SYSTOLIC BLOOD PRESSURE: 120 MMHG | HEART RATE: 77 BPM | DIASTOLIC BLOOD PRESSURE: 70 MMHG | TEMPERATURE: 97 F | HEIGHT: 61 IN

## 2023-07-26 DIAGNOSIS — G89.29 CHRONIC INTRACTABLE HEADACHE, UNSPECIFIED HEADACHE TYPE: Primary | ICD-10-CM

## 2023-07-26 DIAGNOSIS — R51.9 CHRONIC INTRACTABLE HEADACHE, UNSPECIFIED HEADACHE TYPE: Primary | ICD-10-CM

## 2023-07-26 DIAGNOSIS — R51.9 CHRONIC INTRACTABLE HEADACHE, UNSPECIFIED HEADACHE TYPE: ICD-10-CM

## 2023-07-26 DIAGNOSIS — G89.29 CHRONIC INTRACTABLE HEADACHE, UNSPECIFIED HEADACHE TYPE: ICD-10-CM

## 2023-07-26 PROCEDURE — G8428 CUR MEDS NOT DOCUMENT: HCPCS | Performed by: PSYCHIATRY & NEUROLOGY

## 2023-07-26 PROCEDURE — 3078F DIAST BP <80 MM HG: CPT | Performed by: PSYCHIATRY & NEUROLOGY

## 2023-07-26 PROCEDURE — 99204 OFFICE O/P NEW MOD 45 MIN: CPT | Performed by: PSYCHIATRY & NEUROLOGY

## 2023-07-26 PROCEDURE — G8419 CALC BMI OUT NRM PARAM NOF/U: HCPCS | Performed by: PSYCHIATRY & NEUROLOGY

## 2023-07-26 PROCEDURE — 1090F PRES/ABSN URINE INCON ASSESS: CPT | Performed by: PSYCHIATRY & NEUROLOGY

## 2023-07-26 PROCEDURE — 3074F SYST BP LT 130 MM HG: CPT | Performed by: PSYCHIATRY & NEUROLOGY

## 2023-07-26 RX ORDER — TOPIRAMATE 25 MG/1
25 TABLET ORAL NIGHTLY
Qty: 30 TABLET | Refills: 3 | Status: SHIPPED | OUTPATIENT
Start: 2023-07-26

## 2023-07-26 NOTE — PROGRESS NOTES
NEUROLOGY NEW PATIENT CONSULTATION      7/26/2023    RE: Thee Renee    REFERRED BY:  Candy Weber MD    CHIEF COMPLAINT:  This is Thee Renee is a 78 y.o. female  who had concerns including New Patient and Dizziness. HPI:     Since June 2002, patient noted headache, daily, bifrontal, sharp, 8/10,   Takes Excedrin which helps. (-) jaw claudication    History of migraines in her 25s and 30s    (+) R eye macular degeneration 2-3 yrs ago  (-) joint pain   (-) head trauma  (+) problem sleeping    CT head June 2022: No acute process    Review of Systems  (-) fever  (-) rash    All other systems reviewed and are negative    PMH  Past Medical History:   Diagnosis Date    Acute blood loss anemia 2/8/2021    Anxiety     Cancer (HCC)     Skin of face. Cataracts, bilateral     Chronic back pain     x1 yr;seeing Chiropractor for a year    Diabetes ([address])     Diverticulitis 6/20/2022    Dizziness of unknown cause 3/10/2016    Fall 6/20/2022    GERD (gastroesophageal reflux disease)     Gout     Hypercholesterolemia     Hypertension     Hypothyroidism     Migraine     Shingles        Social Hx  Social History     Socioeconomic History    Marital status:     Tobacco Use    Smoking status: Never    Smokeless tobacco: Never   Substance and Sexual Activity    Alcohol use: No    Drug use: No   Social History Narrative         ** Merged History Encounter **     Social Determinants of Health     Financial Resource Strain: Low Risk     Difficulty of Paying Living Expenses: Not hard at all   Food Insecurity: No Food Insecurity    Worried About Running Out of Food in the Last Year: Never true    Ran Out of Food in the Last Year: Never true   Transportation Needs: Unknown    Lack of Transportation (Non-Medical): No   Housing Stability: Unknown    Unstable Housing in the Last Year: No       Family Hx  Family History   Problem Relation Age of Onset    Other Mother         Poliosis    Alzheimer's Disease Mother

## 2023-07-27 DIAGNOSIS — G89.29 CHRONIC INTRACTABLE HEADACHE, UNSPECIFIED HEADACHE TYPE: ICD-10-CM

## 2023-07-27 DIAGNOSIS — R51.9 CHRONIC INTRACTABLE HEADACHE, UNSPECIFIED HEADACHE TYPE: ICD-10-CM

## 2023-07-28 LAB
ANA SER QL: NEGATIVE
ERYTHROCYTE [SEDIMENTATION RATE] IN BLOOD BY WESTERGREN METHOD: 11 MM/HR (ref 0–40)

## 2023-08-02 RX ORDER — SITAGLIPTIN 50 MG/1
TABLET, FILM COATED ORAL
Qty: 90 TABLET | Refills: 1 | Status: SHIPPED | OUTPATIENT
Start: 2023-08-02

## 2023-08-03 LAB
14-3-3 ETA AG SER IA-MCNC: <0.2 NG/ML
CCP IGA+IGG SERPL IA-ACNC: <20 UNITS
RHEUMATOID FACT SERPL-ACNC: <14 UNITS/ML

## 2023-08-04 ENCOUNTER — TELEPHONE (OUTPATIENT)
Age: 83
End: 2023-08-04

## 2023-08-07 NOTE — TELEPHONE ENCOUNTER
RE:Topiramate  Key: [id number]      Rcvd notification via CMM that medication was approved. Approvedtoday  CaseId:[id number];Status:Approved; Review Type:Prior Auth; Coverage Start Date:07/08/2023; Coverage End Date:08/06/2024;      Nurse notified

## 2023-08-09 ENCOUNTER — TELEPHONE (OUTPATIENT)
Facility: CLINIC | Age: 83
End: 2023-08-09

## 2023-08-09 NOTE — TELEPHONE ENCOUNTER
[facility] called in regards to prev diabetic paperwork being no longer valid and they have refaxed new paperwork that needs to be signed and with a co- signature from MD/DO per medicare.  Original order was put in by TSS

## 2023-08-09 NOTE — TELEPHONE ENCOUNTER
Have TSS complete based on her exam and I will be happy to co-sign. Ok to leave in my folder when forms ready for my portion.

## 2023-08-10 PROBLEM — L84 CALLUSE: Status: ACTIVE | Noted: 2023-08-10

## 2023-08-10 PROBLEM — M79.2 PERIPHERAL NEURALGIA: Status: ACTIVE | Noted: 2023-08-10

## 2023-08-10 PROBLEM — G61.89 OTHER INFLAMMATORY POLYNEUROPATHIES (HCC): Status: ACTIVE | Noted: 2023-08-10

## 2023-09-15 RX ORDER — LEVOTHYROXINE SODIUM 0.05 MG/1
50 TABLET ORAL
Qty: 90 TABLET | Refills: 1 | Status: SHIPPED | OUTPATIENT
Start: 2023-09-15

## 2023-09-18 ENCOUNTER — TELEPHONE (OUTPATIENT)
Facility: CLINIC | Age: 83
End: 2023-09-18

## 2023-09-18 NOTE — TELEPHONE ENCOUNTER
[facility] called in regards to pt's diabetic shoes. Stated that the forms would need an MD/DO signature and faxed back before pt can be dispensed these shoes.    Fax [phone]    Phone [phone] option 9

## 2023-10-29 RX ORDER — ATENOLOL 25 MG/1
TABLET ORAL
Qty: 90 TABLET | Refills: 1 | Status: SHIPPED | OUTPATIENT
Start: 2023-10-29

## 2023-11-13 ENCOUNTER — TELEPHONE (OUTPATIENT)
Facility: CLINIC | Age: 83
End: 2023-11-13

## 2023-11-13 NOTE — TELEPHONE ENCOUNTER
----- Message from Marlenaandrzejrosalinda Bhandari sent at 11/13/2023  3:35 PM EST -----  Subject: Message to Provider    QUESTIONS  Information for Provider? Pt called in to return a call she missed from   the office; pt isn't sure what the call was about.  Please call pt.   ---------------------------------------------------------------------------  --------------  Mari Cueto INFO  [phone]; OK to leave message on voicemail  ---------------------------------------------------------------------------  --------------  SCRIPT ANSWERS  undefined

## 2023-11-21 RX ORDER — ALLOPURINOL 100 MG/1
100 TABLET ORAL DAILY
COMMUNITY
End: 2023-11-22 | Stop reason: SDUPTHER

## 2023-11-21 NOTE — TELEPHONE ENCOUNTER
Pt called wanting to know if it would be possible to get a refill of Allopurinol 100mg due to a Gout flair up.  Pt stated that she hasn't had one in over two years   Yanique

## 2023-11-22 RX ORDER — ALLOPURINOL 100 MG/1
100 TABLET ORAL DAILY
Qty: 90 TABLET | Refills: 1 | Status: SHIPPED | OUTPATIENT
Start: 2023-11-22

## 2023-11-29 ENCOUNTER — OFFICE VISIT (OUTPATIENT)
Facility: CLINIC | Age: 83
End: 2023-11-29

## 2023-11-29 VITALS
SYSTOLIC BLOOD PRESSURE: 124 MMHG | TEMPERATURE: 97.3 F | OXYGEN SATURATION: 96 % | HEART RATE: 82 BPM | HEIGHT: 61 IN | RESPIRATION RATE: 16 BRPM | DIASTOLIC BLOOD PRESSURE: 78 MMHG | WEIGHT: 147 LBS | BODY MASS INDEX: 27.75 KG/M2

## 2023-11-29 DIAGNOSIS — R15.2 INCONTINENCE OF FECES WITH FECAL URGENCY: ICD-10-CM

## 2023-11-29 DIAGNOSIS — Z13.228 ENCOUNTER FOR SCREENING FOR OTHER METABOLIC DISORDERS: ICD-10-CM

## 2023-11-29 DIAGNOSIS — Z86.2 HISTORY OF ANEMIA: ICD-10-CM

## 2023-11-29 DIAGNOSIS — R15.9 INCONTINENCE OF FECES WITH FECAL URGENCY: ICD-10-CM

## 2023-11-29 DIAGNOSIS — E78.00 HYPERCHOLESTEREMIA: ICD-10-CM

## 2023-11-29 DIAGNOSIS — Z00.00 ENCOUNTER FOR MEDICARE ANNUAL WELLNESS EXAM: Primary | ICD-10-CM

## 2023-11-29 DIAGNOSIS — I10 HYPERTENSION, UNSPECIFIED TYPE: ICD-10-CM

## 2023-11-29 DIAGNOSIS — Z91.81 AT HIGH RISK FOR FALLS: ICD-10-CM

## 2023-11-29 DIAGNOSIS — E11.22 TYPE 2 DIABETES MELLITUS WITH CHRONIC KIDNEY DISEASE, WITHOUT LONG-TERM CURRENT USE OF INSULIN, UNSPECIFIED CKD STAGE (HCC): ICD-10-CM

## 2023-11-29 DIAGNOSIS — K57.90 DIVERTICULOSIS: ICD-10-CM

## 2023-11-29 DIAGNOSIS — G62.9 NEUROPATHY: ICD-10-CM

## 2023-11-29 DIAGNOSIS — E78.5 HYPERLIPIDEMIA, UNSPECIFIED HYPERLIPIDEMIA TYPE: ICD-10-CM

## 2023-11-29 DIAGNOSIS — Z13.0 SCREENING FOR DEFICIENCY ANEMIA: ICD-10-CM

## 2023-11-29 DIAGNOSIS — E03.9 ACQUIRED HYPOTHYROIDISM: ICD-10-CM

## 2023-11-29 PROBLEM — R10.33 PERIUMBILICAL ABDOMINAL PAIN: Status: RESOLVED | Noted: 2022-11-09 | Resolved: 2023-11-29

## 2023-11-29 PROBLEM — L84 CALLUSE: Status: RESOLVED | Noted: 2023-08-10 | Resolved: 2023-11-29

## 2023-11-29 RX ORDER — LATANOPROST 50 UG/ML
1 SOLUTION/ DROPS OPHTHALMIC NIGHTLY
COMMUNITY
Start: 2023-11-22

## 2023-11-29 RX ORDER — PHENOL 1.4 %
AEROSOL, SPRAY (ML) MUCOUS MEMBRANE
Qty: 90 TABLET | Refills: 1 | Status: SHIPPED | OUTPATIENT
Start: 2023-11-29

## 2023-11-29 RX ORDER — CLOBETASOL PROPIONATE 0.5 MG/G
OINTMENT TOPICAL
COMMUNITY
Start: 2023-10-22

## 2023-11-29 RX ORDER — ALENDRONATE SODIUM 70 MG/1
TABLET ORAL
Qty: 12 TABLET | Refills: 1 | Status: SHIPPED | OUTPATIENT
Start: 2023-11-29

## 2023-11-29 ASSESSMENT — PATIENT HEALTH QUESTIONNAIRE - PHQ9
SUM OF ALL RESPONSES TO PHQ QUESTIONS 1-9: 0
1. LITTLE INTEREST OR PLEASURE IN DOING THINGS: 0
2. FEELING DOWN, DEPRESSED OR HOPELESS: 0
SUM OF ALL RESPONSES TO PHQ QUESTIONS 1-9: 0
SUM OF ALL RESPONSES TO PHQ9 QUESTIONS 1 & 2: 0

## 2023-11-29 ASSESSMENT — LIFESTYLE VARIABLES
HOW OFTEN DO YOU HAVE A DRINK CONTAINING ALCOHOL: NEVER
HOW MANY STANDARD DRINKS CONTAINING ALCOHOL DO YOU HAVE ON A TYPICAL DAY: PATIENT DOES NOT DRINK

## 2023-11-29 NOTE — PATIENT INSTRUCTIONS

## 2023-11-30 LAB
ALBUMIN SERPL-MCNC: 4 G/DL (ref 3.7–4.7)
ALBUMIN/GLOB SERPL: 1.9 {RATIO} (ref 1.2–2.2)
ALP SERPL-CCNC: 70 IU/L (ref 44–121)
ALT SERPL-CCNC: 17 IU/L (ref 0–32)
AST SERPL-CCNC: 19 IU/L (ref 0–40)
BASOPHILS # BLD AUTO: 0.1 X10E3/UL (ref 0–0.2)
BASOPHILS NFR BLD AUTO: 1 %
BILIRUB SERPL-MCNC: <0.2 MG/DL (ref 0–1.2)
BUN SERPL-MCNC: 30 MG/DL (ref 8–27)
BUN/CREAT SERPL: 25 (ref 12–28)
CALCIUM SERPL-MCNC: 9.7 MG/DL (ref 8.7–10.3)
CHLORIDE SERPL-SCNC: 105 MMOL/L (ref 96–106)
CHOLEST SERPL-MCNC: 126 MG/DL (ref 100–199)
CO2 SERPL-SCNC: 24 MMOL/L (ref 20–29)
CREAT SERPL-MCNC: 1.2 MG/DL (ref 0.57–1)
EGFRCR SERPLBLD CKD-EPI 2021: 44 ML/MIN/1.73
EOSINOPHIL # BLD AUTO: 0.5 X10E3/UL (ref 0–0.4)
EOSINOPHIL NFR BLD AUTO: 7 %
ERYTHROCYTE [DISTWIDTH] IN BLOOD BY AUTOMATED COUNT: 12.5 % (ref 11.7–15.4)
GLOBULIN SER CALC-MCNC: 2.1 G/DL (ref 1.5–4.5)
GLUCOSE SERPL-MCNC: 123 MG/DL (ref 70–99)
HBA1C MFR BLD: 6 % (ref 4.8–5.6)
HCT VFR BLD AUTO: 37 % (ref 34–46.6)
HDLC SERPL-MCNC: 35 MG/DL
HGB BLD-MCNC: 12.2 G/DL (ref 11.1–15.9)
IMM GRANULOCYTES # BLD AUTO: 0 X10E3/UL (ref 0–0.1)
IMM GRANULOCYTES NFR BLD AUTO: 1 %
LDLC SERPL CALC-MCNC: 52 MG/DL (ref 0–99)
LYMPHOCYTES # BLD AUTO: 1.4 X10E3/UL (ref 0.7–3.1)
LYMPHOCYTES NFR BLD AUTO: 18 %
MCH RBC QN AUTO: 31.8 PG (ref 26.6–33)
MCHC RBC AUTO-ENTMCNC: 33 G/DL (ref 31.5–35.7)
MCV RBC AUTO: 96 FL (ref 79–97)
MONOCYTES # BLD AUTO: 1 X10E3/UL (ref 0.1–0.9)
MONOCYTES NFR BLD AUTO: 13 %
NEUTROPHILS # BLD AUTO: 4.8 X10E3/UL (ref 1.4–7)
NEUTROPHILS NFR BLD AUTO: 60 %
PLATELET # BLD AUTO: 239 X10E3/UL (ref 150–450)
POTASSIUM SERPL-SCNC: 4.7 MMOL/L (ref 3.5–5.2)
PROT SERPL-MCNC: 6.1 G/DL (ref 6–8.5)
RBC # BLD AUTO: 3.84 X10E6/UL (ref 3.77–5.28)
SODIUM SERPL-SCNC: 145 MMOL/L (ref 134–144)
TRIGL SERPL-MCNC: 245 MG/DL (ref 0–149)
TSH SERPL DL<=0.005 MIU/L-ACNC: 3.78 UIU/ML (ref 0.45–4.5)
VLDLC SERPL CALC-MCNC: 39 MG/DL (ref 5–40)
WBC # BLD AUTO: 7.8 X10E3/UL (ref 3.4–10.8)

## 2023-12-03 PROBLEM — Z00.00 ENCOUNTER FOR MEDICARE ANNUAL WELLNESS EXAM: Status: ACTIVE | Noted: 2022-11-09

## 2023-12-03 PROBLEM — Z13.0 SCREENING FOR DEFICIENCY ANEMIA: Status: ACTIVE | Noted: 2022-11-09

## 2023-12-14 RX ORDER — SIMVASTATIN 10 MG
TABLET ORAL
Qty: 90 TABLET | Refills: 1 | Status: SHIPPED | OUTPATIENT
Start: 2023-12-14

## 2023-12-19 ENCOUNTER — TELEPHONE (OUTPATIENT)
Facility: CLINIC | Age: 83
End: 2023-12-19

## 2023-12-19 NOTE — TELEPHONE ENCOUNTER
Patient called stating that pharmacy is waiting on rx for lancets strips for accucheck meter, she also states that she doesn't know if the medicare information form needs to be filled out again or not . But only has 1 lancet left

## 2024-01-02 PROBLEM — Z13.0 SCREENING FOR DEFICIENCY ANEMIA: Status: RESOLVED | Noted: 2022-11-09 | Resolved: 2024-01-02

## 2024-01-06 RX ORDER — GLIPIZIDE 5 MG/1
TABLET ORAL
Qty: 180 TABLET | Refills: 1 | Status: SHIPPED | OUTPATIENT
Start: 2024-01-06

## 2024-01-22 RX ORDER — SITAGLIPTIN 50 MG/1
TABLET, FILM COATED ORAL
Qty: 90 TABLET | Refills: 1 | Status: SHIPPED | OUTPATIENT
Start: 2024-01-22

## 2024-03-06 ENCOUNTER — TRANSCRIBE ORDERS (OUTPATIENT)
Facility: HOSPITAL | Age: 84
End: 2024-03-06

## 2024-03-06 ENCOUNTER — HOSPITAL ENCOUNTER (OUTPATIENT)
Facility: HOSPITAL | Age: 84
Discharge: HOME | End: 2024-03-09
Attending: PHYSICAL MEDICINE & REHABILITATION
Payer: MEDICARE

## 2024-03-06 DIAGNOSIS — R52 PAIN: ICD-10-CM

## 2024-03-06 DIAGNOSIS — M16.10 PRIMARY OSTEOARTHRITIS OF HIP, UNSPECIFIED LATERALITY: ICD-10-CM

## 2024-03-06 DIAGNOSIS — M16.11 PRIMARY OSTEOARTHRITIS OF RIGHT HIP: Primary | ICD-10-CM

## 2024-03-06 DIAGNOSIS — M16.11 PRIMARY OSTEOARTHRITIS OF RIGHT HIP: ICD-10-CM

## 2024-03-06 PROCEDURE — 72110 X-RAY EXAM L-2 SPINE 4/>VWS: CPT

## 2024-03-06 PROCEDURE — 73502 X-RAY EXAM HIP UNI 2-3 VIEWS: CPT

## 2024-03-06 PROCEDURE — 72148 MRI LUMBAR SPINE W/O DYE: CPT

## 2024-03-08 ENCOUNTER — TELEPHONE (OUTPATIENT)
Facility: CLINIC | Age: 84
End: 2024-03-08

## 2024-03-08 NOTE — TELEPHONE ENCOUNTER
CHA    Patient called with the concern that her fbs this morning was 55 at 7:37am. She felt shaky so drank some orange juice.  She ate breakfast, egg, toast, 1 piece of zhang. And then around 2pm felt jittery again so she checked her sugar and it was 167. Patient was calling because she thought her sugar was too high. While talking to patient, she remembered she also ate chocolate chip cookies around 10 am and stated maybe that is why my sugar went up.  Patient does take glipizide and januvia.  Patient advised to continue to take her blood sugars and keep a log of the readings and food intake. Patient denies any other symptoms.   Patient states that someone did call her the other day to reschedule her May appt because of TSS no longer being with us. Patient stated to whoever called her that she needed to check her other appts and call back to reschedule.  Patient encouraged to check her schedule and call us back to make her appointment with one of the other providers here.  Patient also advised that if she experiences any concerning symptoms to seek ER/UC as we are not open on the weekend.

## 2024-03-18 DIAGNOSIS — E03.9 HYPOTHYROIDISM, UNSPECIFIED TYPE: Primary | ICD-10-CM

## 2024-03-18 RX ORDER — LEVOTHYROXINE SODIUM 0.05 MG/1
50 TABLET ORAL
Qty: 90 TABLET | Refills: 2 | Status: SHIPPED | OUTPATIENT
Start: 2024-03-18

## 2024-05-01 NOTE — TELEPHONE ENCOUNTER
Future Appointments  5/1/2024 - 5/1/2026     Date Visit Type Department Provider    5/31/2024 10:30 AM OFFICE VISIT [facility] Pau Tavarez MD   Appointment Notes:   Return in about 6 months (around 5/29/2024) for follow up.

## 2024-05-02 RX ORDER — ATENOLOL 25 MG/1
TABLET ORAL
Qty: 90 TABLET | Refills: 1 | Status: SHIPPED | OUTPATIENT
Start: 2024-05-02

## 2024-05-22 ENCOUNTER — HOSPITAL ENCOUNTER (OUTPATIENT)
Facility: HOSPITAL | Age: 84
Discharge: HOME | End: 2024-05-25
Payer: MEDICARE

## 2024-05-22 ENCOUNTER — APPOINTMENT (OUTPATIENT)
Facility: HOSPITAL | Age: 84
End: 2024-05-22
Payer: MEDICARE

## 2024-05-22 ENCOUNTER — HOSPITAL ENCOUNTER (EMERGENCY)
Facility: HOSPITAL | Age: 84
Discharge: HOME | End: 2024-05-22
Attending: EMERGENCY MEDICINE
Payer: MEDICARE

## 2024-05-22 ENCOUNTER — TRANSCRIBE ORDERS (OUTPATIENT)
Facility: HOSPITAL | Age: 84
End: 2024-05-22

## 2024-05-22 VITALS
RESPIRATION RATE: 17 BRPM | WEIGHT: 149 LBS | BODY MASS INDEX: 28.13 KG/M2 | HEART RATE: 68 BPM | SYSTOLIC BLOOD PRESSURE: 146 MMHG | DIASTOLIC BLOOD PRESSURE: 80 MMHG | HEIGHT: 61 IN | TEMPERATURE: 98.2 F | OXYGEN SATURATION: 98 %

## 2024-05-22 DIAGNOSIS — M25.551 PAIN IN JOINT INVOLVING RIGHT PELVIC REGION AND THIGH: Primary | ICD-10-CM
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
## 2024-05-22 DIAGNOSIS — S93.402A SPRAIN OF LEFT ANKLE, UNSPECIFIED LIGAMENT, INITIAL ENCOUNTER: ICD-10-CM

## 2024-05-22 DIAGNOSIS — M25.551 PAIN IN JOINT INVOLVING RIGHT PELVIC REGION AND THIGH: ICD-10-CM

## 2024-05-22 DIAGNOSIS — M25.559 PAIN IN JOINT INVOLVING PELVIC REGION AND THIGH, UNSPECIFIED LATERALITY: ICD-10-CM

## 2024-05-22 PROCEDURE — 70450 CT HEAD/BRAIN W/O DYE: CPT

## 2024-05-22 PROCEDURE — 99284 EMERGENCY DEPT VISIT MOD MDM: CPT

## 2024-05-22 PROCEDURE — 73630 X-RAY EXAM OF FOOT: CPT

## 2024-05-22 PROCEDURE — 72131 CT LUMBAR SPINE W/O DYE: CPT

## 2024-05-22 PROCEDURE — 72125 CT NECK SPINE W/O DYE: CPT

## 2024-05-22 PROCEDURE — 73552 X-RAY EXAM OF FEMUR 2/>: CPT

## 2024-05-22 PROCEDURE — 72170 X-RAY EXAM OF PELVIS: CPT

## 2024-05-22 ASSESSMENT — LIFESTYLE VARIABLES
HOW MANY STANDARD DRINKS CONTAINING ALCOHOL DO YOU HAVE ON A TYPICAL DAY: PATIENT DOES NOT DRINK
HOW OFTEN DO YOU HAVE A DRINK CONTAINING ALCOHOL: NEVER

## 2024-05-22 ASSESSMENT — ENCOUNTER SYMPTOMS
BACK PAIN: 1
FACIAL SWELLING: 0
VOMITING: 0
NAUSEA: 0
SHORTNESS OF BREATH: 0
RHINORRHEA: 0
ABDOMINAL PAIN: 0

## 2024-05-22 ASSESSMENT — PAIN - FUNCTIONAL ASSESSMENT: PAIN_FUNCTIONAL_ASSESSMENT: 0-10

## 2024-05-22 ASSESSMENT — PAIN SCALES - GENERAL: PAINLEVEL_OUTOF10: 6

## 2024-05-22 NOTE — DISCHARGE INSTRUCTIONS
Take ibuprofen and tylenol as needed for pain. Make sure to take ibuprofen with food.     Ice and elevate the left foot.     Follow-up closely with an orthopedic provider for reevaluation. Call for appointment as soon as possible.

## 2024-05-22 NOTE — ED PROVIDER NOTES
were made to edit the dictations but occasionally words are mis-transcribed.)    ELVIS Clarke NP (electronically signed)  Emergency Attending Physician / Physician Assistant / Nurse Practitioner             Karen Rousseau APRN - NP  05/22/24 [id number]

## 2024-05-22 NOTE — ED TRIAGE NOTES
Pt ambulatory in ED with c/o left foot pain from a fall a week ago. Pt was seen outpatient for xrays of right leg and hip from PCP today but wasn't seen for left foot.

## 2024-05-31 ENCOUNTER — OFFICE VISIT (OUTPATIENT)
Facility: CLINIC | Age: 84
End: 2024-05-31
Payer: MEDICARE

## 2024-05-31 VITALS
BODY MASS INDEX: 28.32 KG/M2 | DIASTOLIC BLOOD PRESSURE: 74 MMHG | WEIGHT: 150 LBS | OXYGEN SATURATION: 94 % | HEART RATE: 72 BPM | SYSTOLIC BLOOD PRESSURE: 120 MMHG | HEIGHT: 61 IN | TEMPERATURE: 97.6 F

## 2024-05-31 DIAGNOSIS — M85.89 OSTEOPENIA OF MULTIPLE SITES: ICD-10-CM

## 2024-05-31 DIAGNOSIS — K59.00 CONSTIPATION, UNSPECIFIED CONSTIPATION TYPE: ICD-10-CM

## 2024-05-31 DIAGNOSIS — B37.31 VULVAR CANDIDIASIS: ICD-10-CM

## 2024-05-31 DIAGNOSIS — I10 PRIMARY HYPERTENSION: ICD-10-CM

## 2024-05-31 DIAGNOSIS — K43.9 HERNIA OF ABDOMINAL WALL: ICD-10-CM

## 2024-05-31 DIAGNOSIS — E78.2 MIXED HYPERLIPIDEMIA: ICD-10-CM

## 2024-05-31 DIAGNOSIS — E11.22 TYPE 2 DIABETES MELLITUS WITH CHRONIC KIDNEY DISEASE, WITHOUT LONG-TERM CURRENT USE OF INSULIN, UNSPECIFIED CKD STAGE (HCC): Primary | ICD-10-CM

## 2024-05-31 PROBLEM — G61.89 OTHER INFLAMMATORY POLYNEUROPATHIES (HCC): Status: RESOLVED | Noted: 2023-08-10 | Resolved: 2024-05-31

## 2024-05-31 PROCEDURE — 3074F SYST BP LT 130 MM HG: CPT | Performed by: FAMILY MEDICINE

## 2024-05-31 PROCEDURE — G8419 CALC BMI OUT NRM PARAM NOF/U: HCPCS | Performed by: FAMILY MEDICINE

## 2024-05-31 PROCEDURE — G8427 DOCREV CUR MEDS BY ELIG CLIN: HCPCS | Performed by: FAMILY MEDICINE

## 2024-05-31 PROCEDURE — 1036F TOBACCO NON-USER: CPT | Performed by: FAMILY MEDICINE

## 2024-05-31 PROCEDURE — 1090F PRES/ABSN URINE INCON ASSESS: CPT | Performed by: FAMILY MEDICINE

## 2024-05-31 PROCEDURE — 1123F ACP DISCUSS/DSCN MKR DOCD: CPT | Performed by: FAMILY MEDICINE

## 2024-05-31 PROCEDURE — G8399 PT W/DXA RESULTS DOCUMENT: HCPCS | Performed by: FAMILY MEDICINE

## 2024-05-31 PROCEDURE — 3078F DIAST BP <80 MM HG: CPT | Performed by: FAMILY MEDICINE

## 2024-05-31 PROCEDURE — 99215 OFFICE O/P EST HI 40 MIN: CPT | Performed by: FAMILY MEDICINE

## 2024-05-31 RX ORDER — CALCIUM CITRATE/VITAMIN D3 200MG-6.25
TABLET ORAL
COMMUNITY
Start: 2024-03-26

## 2024-05-31 RX ORDER — PATIROMER 8.4 G/1
POWDER, FOR SUSPENSION ORAL
COMMUNITY
Start: 2023-11-10

## 2024-05-31 RX ORDER — CLOTRIMAZOLE 1 %
CREAM WITH APPLICATOR VAGINAL
Qty: 45 G | Refills: 0 | Status: SHIPPED | OUTPATIENT
Start: 2024-05-31 | End: 2024-06-07

## 2024-05-31 RX ORDER — FAMOTIDINE, CALCIUM CARBONATE, AND MAGNESIUM HYDROXIDE 10; 800; 165 MG/1; MG/1; MG/1
TABLET, CHEWABLE ORAL
COMMUNITY

## 2024-05-31 RX ORDER — POLYETHYLENE GLYCOL 3350 17 G/17G
17 POWDER, FOR SOLUTION ORAL DAILY
Qty: 578 G | Refills: 1 | Status: SHIPPED | OUTPATIENT
Start: 2024-05-31 | End: 2024-08-07

## 2024-05-31 SDOH — ECONOMIC STABILITY: FOOD INSECURITY: WITHIN THE PAST 12 MONTHS, THE FOOD YOU BOUGHT JUST DIDN'T LAST AND YOU DIDN'T HAVE MONEY TO GET MORE.: NEVER TRUE

## 2024-05-31 SDOH — ECONOMIC STABILITY: FOOD INSECURITY: WITHIN THE PAST 12 MONTHS, YOU WORRIED THAT YOUR FOOD WOULD RUN OUT BEFORE YOU GOT MONEY TO BUY MORE.: NEVER TRUE

## 2024-05-31 SDOH — ECONOMIC STABILITY: INCOME INSECURITY: HOW HARD IS IT FOR YOU TO PAY FOR THE VERY BASICS LIKE FOOD, HOUSING, MEDICAL CARE, AND HEATING?: NOT HARD AT ALL

## 2024-05-31 ASSESSMENT — PATIENT HEALTH QUESTIONNAIRE - PHQ9
1. LITTLE INTEREST OR PLEASURE IN DOING THINGS: NOT AT ALL
SUM OF ALL RESPONSES TO PHQ QUESTIONS 1-9: 0
2. FEELING DOWN, DEPRESSED OR HOPELESS: NOT AT ALL
SUM OF ALL RESPONSES TO PHQ QUESTIONS 1-9: 0
SUM OF ALL RESPONSES TO PHQ9 QUESTIONS 1 & 2: 0

## 2024-05-31 NOTE — PROGRESS NOTES
Chief Complaint   Patient presents with    Follow-up Chronic Condition       \"Have you been to the ER, urgent care clinic since your last visit?  Hospitalized since your last visit?\"    YES - When: approximately 1  weeks ago.  Where and Why: [facility] ED for fall.    “Have you seen or consulted any other health care providers outside of [facility] since your last visit?”    YES - When: approximately 2  weeks ago.  Where and Why: [facility]            Click Here for Release of Records Request    PHQ-9 Total Score: 0 (5/31/2024 10:36 AM)         
they are unsure of or forget any changes we discussed today or if the symptoms change.  The patient received an After-Visit Summary which contains VS, orders, medication list and allergy list. This can be used as a \"mini-medical record\" should they have to seek medical care while out of town.    Current Outpatient Medications on File Prior to Visit   Medication Sig Dispense Refill    TRUE METRIX BLOOD GLUCOSE TEST strip TEST BLOOD SUGAR once daily      VELTASSA 8.4 g PACK packet MIX 1 PACKET IN LIQUID AND DRINK DAILY      HM LIDOCAINE PATCH EX Apply topically      Simethicone (GAS-X EXTRA STRENGTH PO) Take by mouth      Famotidine-Ca Carb-Mag Hydrox (PEPCID COMPLETE) -165 MG CHEW Take by mouth      atenolol (TENORMIN) 25 MG tablet take 1 tablet by mouth once daily 90 tablet 1    levothyroxine (SYNTHROID) 50 MCG tablet Take 1 tablet by mouth every morning (before breakfast) 90 tablet 2    JANUVIA 50 MG tablet take 1 tablet by mouth once daily 90 tablet 1    simvastatin (ZOCOR) 10 MG tablet take 1 tablet by mouth at bedtime (DISCONTINUE 20MG) 90 tablet 1    latanoprost (XALATAN) 0.005 % ophthalmic solution Place 1 drop into both eyes at bedtime      Melatonin 10 MG TABS Take one tablet for sleep at about 20 mins before going to bed 90 tablet 1    allopurinol (ZYLOPRIM) 100 MG tablet Take 1 tablet by mouth daily 90 tablet 1    Multiple Vitamins-Minerals (MULTIVITAMIN ADULT EXTRA C PO) Take 1 tablet by mouth daily      Omega-3 Fatty Acids (FISH OIL) 1000 MG capsule Take 1 capsule by mouth daily      psyllium (KONSYL) 28.3 % PACK Take 1 packet by mouth 2 times daily metamucil      Multiple Vitamins-Minerals (PRESERVISION AREDS 2 PO) Take by mouth      Lancets MISC       aspirin 81 MG EC tablet Take 1 tablet by mouth      coenzyme Q10 100 MG CAPS capsule Take 1 capsule by mouth daily      clobetasol (TEMOVATE) 0.05 % ointment apply ointment 2 x weekly to vulva as mainteance, increase to daily for flares as needed

## 2024-06-01 LAB
ALBUMIN/CREAT UR: 119 MG/G CREAT (ref 0–29)
APPEARANCE UR: CLEAR
BACTERIA #/AREA URNS HPF: ABNORMAL /[HPF]
BILIRUB UR QL STRIP: NEGATIVE
CASTS URNS QL MICRO: ABNORMAL /LPF
COLOR UR: YELLOW
CREAT UR-MCNC: 69.3 MG/DL
EPI CELLS #/AREA URNS HPF: ABNORMAL /HPF (ref 0–10)
GLUCOSE UR QL STRIP: NEGATIVE
HGB UR QL STRIP: NEGATIVE
KETONES UR QL STRIP: NEGATIVE
LEUKOCYTE ESTERASE UR QL STRIP: ABNORMAL
MICRO URNS: ABNORMAL
MICROALBUMIN UR-MCNC: 82.4 UG/ML
NITRITE UR QL STRIP: NEGATIVE
PH UR STRIP: 5.5 [PH] (ref 5–7.5)
PROT UR QL STRIP: ABNORMAL
RBC #/AREA URNS HPF: ABNORMAL /HPF (ref 0–2)
SP GR UR STRIP: 1.02 (ref 1–1.03)
UROBILINOGEN UR STRIP-MCNC: 0.2 MG/DL (ref 0.2–1)
WBC #/AREA URNS HPF: ABNORMAL /HPF (ref 0–5)

## 2024-06-05 ENCOUNTER — HOSPITAL ENCOUNTER (OUTPATIENT)
Facility: HOSPITAL | Age: 84
Discharge: HOME | End: 2024-06-08
Attending: FAMILY MEDICINE
Payer: MEDICARE

## 2024-06-05 DIAGNOSIS — M85.89 OSTEOPENIA OF MULTIPLE SITES: ICD-10-CM

## 2024-06-05 PROCEDURE — 77080 DXA BONE DENSITY AXIAL: CPT

## 2024-06-06 ENCOUNTER — TELEPHONE (OUTPATIENT)
Facility: CLINIC | Age: 84
End: 2024-06-06

## 2024-06-06 ENCOUNTER — TELEPHONE (OUTPATIENT)
Age: 84
End: 2024-06-06

## 2024-06-06 NOTE — TELEPHONE ENCOUNTER
Pt called in regards to the Polyethylene Glycol that she was recently prescribed. Pt stated she was reading that it can have an effect on the kidneys. Pt stated she would like a call back to discuss this medication and is worried it may not be safe because she already has kidney issues and sees a nephrologist

## 2024-06-06 NOTE — TELEPHONE ENCOUNTER
Miralax does not affect the kidneys. The vast majority of this medication stays in the GI tract and does not get into the system. She is ok to take this.     Thanks!

## 2024-06-06 NOTE — TELEPHONE ENCOUNTER
Called patient in regards to a referral for [facility]. Patient did not answer, left voicemail instructing patient to call back.

## 2024-06-11 RX ORDER — SIMVASTATIN 10 MG
TABLET ORAL
Qty: 90 TABLET | Refills: 1 | Status: SHIPPED | OUTPATIENT
Start: 2024-06-11

## 2024-06-24 ENCOUNTER — OFFICE VISIT (OUTPATIENT)
Age: 84
End: 2024-06-24
Payer: MEDICARE

## 2024-06-24 VITALS
HEART RATE: 111 BPM | HEIGHT: 61 IN | DIASTOLIC BLOOD PRESSURE: 73 MMHG | WEIGHT: 146 LBS | RESPIRATION RATE: 16 BRPM | SYSTOLIC BLOOD PRESSURE: 104 MMHG | OXYGEN SATURATION: 94 % | TEMPERATURE: 98.2 F | BODY MASS INDEX: 27.56 KG/M2

## 2024-06-24 DIAGNOSIS — K42.9 RECURRENT UMBILICAL HERNIA: Primary | ICD-10-CM

## 2024-06-24 PROCEDURE — 1123F ACP DISCUSS/DSCN MKR DOCD: CPT | Performed by: SURGERY

## 2024-06-24 PROCEDURE — G8427 DOCREV CUR MEDS BY ELIG CLIN: HCPCS | Performed by: SURGERY

## 2024-06-24 PROCEDURE — 1090F PRES/ABSN URINE INCON ASSESS: CPT | Performed by: SURGERY

## 2024-06-24 PROCEDURE — 99204 OFFICE O/P NEW MOD 45 MIN: CPT | Performed by: SURGERY

## 2024-06-24 PROCEDURE — 3078F DIAST BP <80 MM HG: CPT | Performed by: SURGERY

## 2024-06-24 PROCEDURE — G8399 PT W/DXA RESULTS DOCUMENT: HCPCS | Performed by: SURGERY

## 2024-06-24 PROCEDURE — 1036F TOBACCO NON-USER: CPT | Performed by: SURGERY

## 2024-06-24 PROCEDURE — 3074F SYST BP LT 130 MM HG: CPT | Performed by: SURGERY

## 2024-06-24 PROCEDURE — G8419 CALC BMI OUT NRM PARAM NOF/U: HCPCS | Performed by: SURGERY

## 2024-06-24 ASSESSMENT — PATIENT HEALTH QUESTIONNAIRE - PHQ9
SUM OF ALL RESPONSES TO PHQ QUESTIONS 1-9: 0
1. LITTLE INTEREST OR PLEASURE IN DOING THINGS: NOT AT ALL
SUM OF ALL RESPONSES TO PHQ QUESTIONS 1-9: 0
2. FEELING DOWN, DEPRESSED OR HOPELESS: NOT AT ALL
SUM OF ALL RESPONSES TO PHQ9 QUESTIONS 1 & 2: 0

## 2024-06-24 NOTE — PROGRESS NOTES
General Surgery Office Consultation / H & P    CC: Hernia  History of Present Illness:      Lara Antonio is a 83 y.o. female who presents with incarcerated umbilical hernia.  Patient reports she had a prior open repair years ago and then had to have the mesh removed for some reason.  She reports that after she eats she feels a bulge at this area of her umbilicus.  The pain is a dull pulling sensation at around a 4-5 out of 10.  No radiation of pain.  Time improves the pain.  No other abdominal surgeries.  Also reports some upper abdominal bloating.  Past Medical History:   Diagnosis Date    Acute blood loss anemia 2/8/2021    Anxiety     Cancer (HCC)     Skin of face.    Cataracts, bilateral     Chronic back pain     x1 yr;seeing Chiropractor for a year    Diabetes (HCC)     Diverticulitis 6/20/2022    Dizziness of unknown cause 3/10/2016    Fall 6/20/2022    GERD (gastroesophageal reflux disease)     Gout     Hypercholesterolemia     Hypertension     Hypothyroidism     Migraine     Shingles      Past Surgical History:   Procedure Laterality Date    CATARACT REMOVAL Bilateral 05/2017    CHOLECYSTECTOMY      Open cholecystectomy.    COLONOSCOPY  06/07/2012    COLONOSCOPY  05/30/2010    COLONOSCOPY N/A 2/9/2021    COLONOSCOPY performed by Giancarlo Nicholas MD at [facility] ENDOSCOPY    DILATION AND CURETTAGE OF UTERUS      DILATION AND CURETTAGE OF UTERUS      HERNIA REPAIR      Umbilical herniorrhaphy with mesh.    OTHER SURGICAL HISTORY  02/2021    blood transfusion    TONSILLECTOMY      TONSILLECTOMY      UPPER GASTROINTESTINAL ENDOSCOPY  06/07/2012    Ulcers in Antrum       Family History   Problem Relation Age of Onset    Other Mother         Poliosis    Alzheimer's Disease Mother     Diabetes Mother     Heart Attack Father      Social History     Socioeconomic History    Marital status:      Spouse name: None    Number of children: None    Years of education: None    Highest education level: None   Tobacco Use

## 2024-06-24 NOTE — PROGRESS NOTES
Identified pt with two pt identifiers (name and ). Reviewed chart in preparation for visit and have obtained necessary documentation.    Lara Antonio is a 83 y.o. female  Chief Complaint   Patient presents with    New Patient     Hernia of abdominal wall      /73 (Site: Left Upper Arm, Position: Sitting, Cuff Size: Large Adult)   Pulse (!) 111   Temp 98.2 °F (36.8 °C) (Oral)   Resp 16   Ht 1.549 m (5' 1\")   Wt 66.2 kg (146 lb)   LMP  (LMP Unknown)   SpO2 94%   BMI 27.59 kg/m²     1. Have you been to the ER, urgent care clinic since your last visit?  Hospitalized since your last visit?no    2. Have you seen or consulted any other health care providers outside of the [facility] since your last visit?  Include any pap smears or colon screening. yes - Kidney'

## 2024-07-09 ENCOUNTER — OFFICE VISIT (OUTPATIENT)
Facility: CLINIC | Age: 84
End: 2024-07-09
Payer: MEDICARE

## 2024-07-09 VITALS
BODY MASS INDEX: 28.13 KG/M2 | RESPIRATION RATE: 20 BRPM | DIASTOLIC BLOOD PRESSURE: 68 MMHG | HEIGHT: 61 IN | WEIGHT: 149 LBS | SYSTOLIC BLOOD PRESSURE: 106 MMHG | OXYGEN SATURATION: 98 % | HEART RATE: 68 BPM | TEMPERATURE: 97.8 F

## 2024-07-09 DIAGNOSIS — K13.70 LESION OF MOUTH: Primary | ICD-10-CM

## 2024-07-09 PROCEDURE — G8419 CALC BMI OUT NRM PARAM NOF/U: HCPCS | Performed by: FAMILY MEDICINE

## 2024-07-09 PROCEDURE — 3074F SYST BP LT 130 MM HG: CPT | Performed by: FAMILY MEDICINE

## 2024-07-09 PROCEDURE — 1090F PRES/ABSN URINE INCON ASSESS: CPT | Performed by: FAMILY MEDICINE

## 2024-07-09 PROCEDURE — G8399 PT W/DXA RESULTS DOCUMENT: HCPCS | Performed by: FAMILY MEDICINE

## 2024-07-09 PROCEDURE — 3078F DIAST BP <80 MM HG: CPT | Performed by: FAMILY MEDICINE

## 2024-07-09 PROCEDURE — G8427 DOCREV CUR MEDS BY ELIG CLIN: HCPCS | Performed by: FAMILY MEDICINE

## 2024-07-09 PROCEDURE — 1123F ACP DISCUSS/DSCN MKR DOCD: CPT | Performed by: FAMILY MEDICINE

## 2024-07-09 PROCEDURE — 1036F TOBACCO NON-USER: CPT | Performed by: FAMILY MEDICINE

## 2024-07-09 PROCEDURE — 99213 OFFICE O/P EST LOW 20 MIN: CPT | Performed by: FAMILY MEDICINE

## 2024-07-09 NOTE — PROGRESS NOTES
\"Have you been to the ER, urgent care clinic since your last visit?  Hospitalized since your last visit?\"    NO    “Have you seen or consulted any other health care providers outside of [facility] since your last visit?”    NO  Chief Complaint   Patient presents with    Pain     Roof of mouth, pain and increase discomfort, past two weeks. No problems with eating or drinking.  Left ear comes and goes-discomfort and left side of jaw area.       /68 (Site: Right Upper Arm, Position: Sitting, Cuff Size: Large Adult)   Pulse 68   Temp 97.8 °F (36.6 °C) (Temporal)   Resp 20   Ht 1.549 m (5' 1\")   Wt 67.6 kg (149 lb)   LMP  (LMP Unknown)   SpO2 98%   BMI 28.15 kg/m²    No data recorded       No questionnaires available.                                  Click Here for Release of Records Request     Identified Patient with 2 Patient Identifiers-Name and

## 2024-07-09 NOTE — PROGRESS NOTES
Subjective  Chief Complaint   Patient presents with    Pain     Roof of mouth, pain and increase discomfort, past two weeks. No problems with eating or drinking.  Left ear comes and goes-discomfort and left side of jaw area.      HPI:  Lara Antonio is a 83 y.o. female.    Symptoms started 2 weeks ago with a raw spot on the roof of her mouth.  Salt water helped but still some discomfort there and now feels like there's a growth there.  Occasionally some discomfort over there left TMJ region.  No pain in that region with opening or closing her jaw.  Growth doesn't seem to be getting any larger now but not certain.    Objective  Vitals:    07/09/24 1035   BP: 106/68   Pulse: 68   Resp: 20   Temp: 97.8 °F (36.6 °C)   SpO2: 98%     Physical Exam  Constitutional:       General: She is not in acute distress.     Appearance: Normal appearance. She is normal weight.   HENT:      Mouth/Throat:        Comments: Lesion on hard palate ~1.5 x 2.5cm.  Nodules over surface.  No drainage. See image.  Pulmonary:      Effort: Pulmonary effort is normal.   Neurological:      General: No focal deficit present.      Mental Status: She is alert and oriented to person, place, and time. Mental status is at baseline.   Psychiatric:         Mood and Affect: Mood normal.         Behavior: Behavior normal.          Assessment & Plan  1. Lesion of mouth  -     [facility] [place] [place]  Sending to ENT to evaluate and treat.  Reviewed with patient that this will likely be removed by them.  She will call them today and let us know if she has any difficulty scheduling.      Aspects of this note have been generated using voice recognition software. Despite editing, there may be some syntax errors.    Irlanda Kingston MD

## 2024-07-10 ENCOUNTER — OFFICE VISIT (OUTPATIENT)
Age: 84
End: 2024-07-10

## 2024-07-10 ENCOUNTER — HOSPITAL ENCOUNTER (OUTPATIENT)
Facility: HOSPITAL | Age: 84
Setting detail: SPECIMEN
Discharge: HOME | End: 2024-07-13

## 2024-07-10 VITALS
WEIGHT: 149 LBS | OXYGEN SATURATION: 95 % | RESPIRATION RATE: 17 BRPM | HEIGHT: 61 IN | BODY MASS INDEX: 28.13 KG/M2 | DIASTOLIC BLOOD PRESSURE: 78 MMHG | HEART RATE: 71 BPM | SYSTOLIC BLOOD PRESSURE: 110 MMHG

## 2024-07-10 DIAGNOSIS — D49.0: ICD-10-CM

## 2024-07-10 DIAGNOSIS — H90.3 SENSORINEURAL HEARING LOSS (SNHL) OF BOTH EARS: Primary | ICD-10-CM

## 2024-07-10 DIAGNOSIS — H92.02 ACUTE OTALGIA, LEFT: ICD-10-CM

## 2024-07-10 ASSESSMENT — ENCOUNTER SYMPTOMS
TROUBLE SWALLOWING: 0
APNEA: 0
WHEEZING: 0
VOMITING: 0
PHOTOPHOBIA: 0
SINUS PAIN: 0
ABDOMINAL PAIN: 0
STRIDOR: 0
BACK PAIN: 0
SINUS PRESSURE: 0
COUGH: 0
VOICE CHANGE: 0
NAUSEA: 0
EYE ITCHING: 0
SHORTNESS OF BREATH: 0
EYE DISCHARGE: 0
SORE THROAT: 0
CHOKING: 0

## 2024-07-10 NOTE — PROGRESS NOTES
Speech: Speech normal.          Assessment/Plan:       ICD-10-CM    1. 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 Acute otalgia, left  H92.02 CT MAXILLOFACIAL W CONTRAST     CT SOFT TISSUE NECK W CONTRAST        Patient with a concerning mass of the posterior hard palate.  Typical location for torus palatinus but the mucosa is irregular with outpouchings and also with her otalgia and mandibular pain and possible lymphadenopathy I have high concern for potential malignancy, possible minor salivary gland neoplasm.    Biopsy is done today I will call patient with these results soon as available.  I am ordering CT scan maxillofacial and neck to assess for local invasion and possible lymphadenopathy.    Orders Placed This Encounter    BIOPSY PALATE/UVULA    CT MAXILLOFACIAL W CONTRAST     Standing Status:   Future     Standing Expiration Date:   7/10/2025     Order Specific Question:   Additional Contrast?     Answer:   None     Order Specific Question:   STAT Creatinine as needed:     Answer:   Yes     Order Specific Question:   Reason for exam:     Answer:   Neoplasm of hard palate    CT SOFT TISSUE NECK W CONTRAST     Standing Status:   Future     Standing Expiration Date:   7/10/2025     Order Specific Question:   Additional Contrast?     Answer:   None     Order Specific Question:   STAT Creatinine as needed:     Answer:   Yes     Order Specific Question:   Reason for exam:     Answer:   Neoplasm of hard palate, cervical lymphadenopathy    Surgical Pathology     Standing Status:   Future     Standing Expiration Date:   7/10/2025     Order Specific Question:   PREVIOUS BIOPSY     Answer:   No     Order Specific Question:   PREOP DIAGNOSIS     Answer:   Neoplasm of hard palate     Order Specific Question:   FROZEN SECTION - NO OR YES/SPECIMEN

## 2024-07-11 NOTE — RESULT ENCOUNTER NOTE
Please let pt know her biopsy came back showing inflammation but no cancer.  I would still like her to get the CT scan we ordered then we will decide next steps.

## 2024-07-16 NOTE — TELEPHONE ENCOUNTER
Could we please get her an appt to get the labs drawn that I had ordered for her at her last visit with me? Thanks!

## 2024-07-17 ENCOUNTER — APPOINTMENT (OUTPATIENT)
Facility: HOSPITAL | Age: 84
End: 2024-07-17
Payer: MEDICARE

## 2024-07-17 ENCOUNTER — HOSPITAL ENCOUNTER (EMERGENCY)
Facility: HOSPITAL | Age: 84
Discharge: HOME | End: 2024-07-17
Attending: EMERGENCY MEDICINE
Payer: MEDICARE

## 2024-07-17 VITALS
RESPIRATION RATE: 18 BRPM | WEIGHT: 147 LBS | OXYGEN SATURATION: 96 % | HEIGHT: 61 IN | BODY MASS INDEX: 27.75 KG/M2 | SYSTOLIC BLOOD PRESSURE: 128 MMHG | TEMPERATURE: 97.4 F | DIASTOLIC BLOOD PRESSURE: 67 MMHG | HEART RATE: 74 BPM

## 2024-07-17 DIAGNOSIS — S51.811A LACERATION OF RIGHT FOREARM, INITIAL ENCOUNTER: Primary | ICD-10-CM

## 2024-07-17 PROCEDURE — 73090 X-RAY EXAM OF FOREARM: CPT

## 2024-07-17 PROCEDURE — 2500000003 HC RX 250 WO HCPCS: Performed by: EMERGENCY MEDICINE

## 2024-07-17 PROCEDURE — 6370000000 HC RX 637 (ALT 250 FOR IP): Performed by: EMERGENCY MEDICINE

## 2024-07-17 PROCEDURE — 99283 EMERGENCY DEPT VISIT LOW MDM: CPT

## 2024-07-17 PROCEDURE — 12002 RPR S/N/AX/GEN/TRNK2.6-7.5CM: CPT

## 2024-07-17 RX ORDER — LIDOCAINE HYDROCHLORIDE AND EPINEPHRINE 15; 5 MG/ML; UG/ML
5 INJECTION, SOLUTION EPIDURAL ONCE
Status: COMPLETED | OUTPATIENT
Start: 2024-07-17 | End: 2024-07-17

## 2024-07-17 RX ORDER — GINSENG 100 MG
CAPSULE ORAL
Status: COMPLETED | OUTPATIENT
Start: 2024-07-17 | End: 2024-07-17

## 2024-07-17 RX ADMIN — BACITRACIN 1 EACH: 500 OINTMENT TOPICAL at 17:40

## 2024-07-17 RX ADMIN — LIDOCAINE HYDROCHLORIDE,EPINEPHRINE BITARTRATE 5 ML: 15; .005 INJECTION, SOLUTION EPIDURAL; INFILTRATION; INTRACAUDAL; PERINEURAL at 17:44

## 2024-07-17 ASSESSMENT — ENCOUNTER SYMPTOMS
DIARRHEA: 0
COLOR CHANGE: 0
SHORTNESS OF BREATH: 0
SORE THROAT: 0
COUGH: 0
ABDOMINAL PAIN: 0
VOMITING: 0
BACK PAIN: 0

## 2024-07-17 ASSESSMENT — PAIN DESCRIPTION - DESCRIPTORS: DESCRIPTORS: ACHING

## 2024-07-17 ASSESSMENT — PAIN DESCRIPTION - LOCATION: LOCATION: ARM

## 2024-07-17 ASSESSMENT — PAIN DESCRIPTION - ORIENTATION: ORIENTATION: RIGHT

## 2024-07-17 ASSESSMENT — PAIN - FUNCTIONAL ASSESSMENT: PAIN_FUNCTIONAL_ASSESSMENT: 0-10

## 2024-07-17 ASSESSMENT — PAIN SCALES - GENERAL: PAINLEVEL_OUTOF10: 2

## 2024-07-17 NOTE — ED PROVIDER NOTES
[facility] EMERGENCY DEPT  EMERGENCY DEPARTMENT ENCOUNTER      Pt Name: Lara Antonio  MRN: [id number]  Birthdate 1940  Date of evaluation: 7/17/2024  Provider: MARCUS Nassar    CHIEF COMPLAINT       Chief Complaint   Patient presents with    Laceration         HISTORY OF PRESENT ILLNESS   (Location/Symptom, Timing/Onset, Context/Setting, Quality, Duration, Modifying Factors, Severity)  Note limiting factors.   Lara Antonio is a 83 y.o. female with past medical history as listed below who presents to the emergency department for evaluation of laceration to her right forearm which occurred just prior to arrival.  She states that the person mowing her lawn accidentally kicked a rock up through her glass door.  The area was cleaned up but she was walking by it and accidentally scraped her forearm on a sharp glass edge on the door.  She did not fall to the ground, denies head injury.  No anticoagulants.  Her tetanus was updated 2 years ago.      Nursing Notes were reviewed.    REVIEW OF SYSTEMS    (2-9 systems for level 4, 10 or more for level 5)     Review of Systems   Constitutional:  Negative for fever.   HENT:  Negative for congestion and sore throat.    Eyes:  Negative for visual disturbance.   Respiratory:  Negative for cough and shortness of breath.    Cardiovascular:  Negative for chest pain.   Gastrointestinal:  Negative for abdominal pain, diarrhea and vomiting.   Genitourinary:  Negative for dysuria.   Musculoskeletal:  Negative for back pain and neck pain.   Skin:  Positive for wound. Negative for color change.   Neurological:  Negative for dizziness and headaches.   Psychiatric/Behavioral:  Negative for confusion.        Except as noted above the remainder of the review of systems was reviewed and negative.       PAST MEDICAL HISTORY     Past Medical History:   Diagnosis Date    Acute blood loss anemia 2/8/2021    Anxiety     Cancer (HCC)     Skin of face.    Cataracts, bilateral     Chronic

## 2024-07-17 NOTE — DISCHARGE INSTRUCTIONS
Lacerations: Your laceration was repaired with 5 sutures. They should be removed in 10 days. Keep the wound dry for 24 hours unless it gets dirty. Wash your lacerations with an antibacterial soap and water. Pat dry. Cover with a layer of antibacterial ointment and cover with bandage. Do this twice daily until healed. Once your wound has healed you should apply sunscreen over the scar for the next year to prevent further scarring while the skin fully heals.     Return to ER with any concern for wound infection: redness, significant swelling, pus-like discharge

## 2024-07-17 NOTE — ED TRIAGE NOTES
PT ambulatory to ED with reports of cutting her arm on a entryway door. PT doesn't know when her last tetanus shot was.

## 2024-07-19 ENCOUNTER — LAB (OUTPATIENT)
Facility: CLINIC | Age: 84
End: 2024-07-19

## 2024-07-20 LAB
ALBUMIN SERPL-MCNC: 3.9 G/DL (ref 3.7–4.7)
ALP SERPL-CCNC: 57 IU/L (ref 44–121)
ALT SERPL-CCNC: 13 IU/L (ref 0–32)
AST SERPL-CCNC: 16 IU/L (ref 0–40)
BASOPHILS # BLD AUTO: 0 X10E3/UL (ref 0–0.2)
BASOPHILS NFR BLD AUTO: 1 %
BILIRUB SERPL-MCNC: 0.4 MG/DL (ref 0–1.2)
BUN SERPL-MCNC: 22 MG/DL (ref 8–27)
BUN/CREAT SERPL: 21 (ref 12–28)
CALCIUM SERPL-MCNC: 9.7 MG/DL (ref 8.7–10.3)
CHLORIDE SERPL-SCNC: 107 MMOL/L (ref 96–106)
CHOLEST SERPL-MCNC: 124 MG/DL (ref 100–199)
CO2 SERPL-SCNC: 23 MMOL/L (ref 20–29)
CREAT SERPL-MCNC: 1.04 MG/DL (ref 0.57–1)
EGFRCR SERPLBLD CKD-EPI 2021: 53 ML/MIN/1.73
EOSINOPHIL # BLD AUTO: 0.3 X10E3/UL (ref 0–0.4)
EOSINOPHIL NFR BLD AUTO: 5 %
ERYTHROCYTE [DISTWIDTH] IN BLOOD BY AUTOMATED COUNT: 12.4 % (ref 11.7–15.4)
GLOBULIN SER CALC-MCNC: 1.9 G/DL (ref 1.5–4.5)
GLUCOSE SERPL-MCNC: 115 MG/DL (ref 70–99)
HBA1C MFR BLD: 6.5 % (ref 4.8–5.6)
HCT VFR BLD AUTO: 35.3 % (ref 34–46.6)
HDLC SERPL-MCNC: 44 MG/DL
HGB BLD-MCNC: 11.9 G/DL (ref 11.1–15.9)
IMM GRANULOCYTES # BLD AUTO: 0 X10E3/UL (ref 0–0.1)
IMM GRANULOCYTES NFR BLD AUTO: 0 %
LDLC SERPL CALC-MCNC: 53 MG/DL (ref 0–99)
LYMPHOCYTES # BLD AUTO: 1.7 X10E3/UL (ref 0.7–3.1)
LYMPHOCYTES NFR BLD AUTO: 34 %
MCH RBC QN AUTO: 32.8 PG (ref 26.6–33)
MCHC RBC AUTO-ENTMCNC: 33.7 G/DL (ref 31.5–35.7)
MCV RBC AUTO: 97 FL (ref 79–97)
MONOCYTES # BLD AUTO: 0.7 X10E3/UL (ref 0.1–0.9)
MONOCYTES NFR BLD AUTO: 14 %
NEUTROPHILS # BLD AUTO: 2.4 X10E3/UL (ref 1.4–7)
NEUTROPHILS NFR BLD AUTO: 46 %
PLATELET # BLD AUTO: 190 X10E3/UL (ref 150–450)
POTASSIUM SERPL-SCNC: 4.4 MMOL/L (ref 3.5–5.2)
PROT SERPL-MCNC: 5.8 G/DL (ref 6–8.5)
RBC # BLD AUTO: 3.63 X10E6/UL (ref 3.77–5.28)
SODIUM SERPL-SCNC: 142 MMOL/L (ref 134–144)
TRIGL SERPL-MCNC: 163 MG/DL (ref 0–149)
VLDLC SERPL CALC-MCNC: 27 MG/DL (ref 5–40)
WBC # BLD AUTO: 5.1 X10E3/UL (ref 3.4–10.8)

## 2024-07-22 ENCOUNTER — HOSPITAL ENCOUNTER (OUTPATIENT)
Facility: HOSPITAL | Age: 84
Discharge: HOME | End: 2024-07-25
Attending: OTOLARYNGOLOGY
Payer: MEDICARE

## 2024-07-22 ENCOUNTER — LAB (OUTPATIENT)
Facility: CLINIC | Age: 84
End: 2024-07-22

## 2024-07-22 DIAGNOSIS — D49.0: ICD-10-CM

## 2024-07-22 DIAGNOSIS — I10 PRIMARY HYPERTENSION: ICD-10-CM

## 2024-07-22 DIAGNOSIS — H92.02 ACUTE OTALGIA, LEFT: ICD-10-CM

## 2024-07-22 PROCEDURE — 6360000004 HC RX CONTRAST MEDICATION: Performed by: OTOLARYNGOLOGY

## 2024-07-22 PROCEDURE — 70491 CT SOFT TISSUE NECK W/DYE: CPT

## 2024-07-22 RX ADMIN — IOPAMIDOL 100 ML: 755 INJECTION, SOLUTION INTRAVENOUS at 14:25

## 2024-07-22 NOTE — CARE COORDINATION
Patient came in for OP CT orders of MaxFace and Soft Tissue Neck with contrast. All anatomy of face is scanned with a STN CT. I called Dr. Fernandes's office and spoke with the RN, I believe her name was Jazmine, and explained that the Mac Face would be a duplicate scan. Cancelled MaxFace CT and scanned Soft Tissue Neck with contrast for patient.

## 2024-07-24 LAB
APPEARANCE UR: CLEAR
BACTERIA #/AREA URNS HPF: NORMAL /[HPF]
BILIRUB UR QL STRIP: NEGATIVE
CASTS URNS QL MICRO: NORMAL /LPF
COLOR UR: YELLOW
EPI CELLS #/AREA URNS HPF: NORMAL /HPF (ref 0–10)
GLUCOSE UR QL STRIP: NEGATIVE
HGB UR QL STRIP: NEGATIVE
KETONES UR QL STRIP: NEGATIVE
LEUKOCYTE ESTERASE UR QL STRIP: ABNORMAL
MICRO URNS: ABNORMAL
NITRITE UR QL STRIP: NEGATIVE
PH UR STRIP: 5.5 [PH] (ref 5–7.5)
PROT UR QL STRIP: ABNORMAL
RBC #/AREA URNS HPF: NORMAL /HPF (ref 0–2)
SP GR UR STRIP: 1.01 (ref 1–1.03)
UROBILINOGEN UR STRIP-MCNC: 0.2 MG/DL (ref 0.2–1)
WBC #/AREA URNS HPF: NORMAL /HPF (ref 0–5)

## 2024-07-26 NOTE — RESULT ENCOUNTER NOTE
Results and provider's recommendations reviewed with patient.  Patient stated that she is not having any urinary symptoms but she does have an external yeast infection and was given a cream by dermatology which she is using to treat it.

## 2024-07-27 ENCOUNTER — HOSPITAL ENCOUNTER (EMERGENCY)
Facility: HOSPITAL | Age: 84
Discharge: HOME | End: 2024-07-27
Attending: EMERGENCY MEDICINE
Payer: MEDICARE

## 2024-07-27 VITALS
DIASTOLIC BLOOD PRESSURE: 78 MMHG | WEIGHT: 148 LBS | HEART RATE: 73 BPM | TEMPERATURE: 97.7 F | RESPIRATION RATE: 18 BRPM | BODY MASS INDEX: 27.94 KG/M2 | OXYGEN SATURATION: 100 % | HEIGHT: 61 IN | SYSTOLIC BLOOD PRESSURE: 127 MMHG

## 2024-07-27 DIAGNOSIS — Z48.02 ENCOUNTER FOR REMOVAL OF SUTURES: Primary | ICD-10-CM

## 2024-07-27 PROCEDURE — 99282 EMERGENCY DEPT VISIT SF MDM: CPT

## 2024-07-27 NOTE — ED PROVIDER NOTES
[facility] EMERGENCY DEPT  EMERGENCY DEPARTMENT ENCOUNTER      Pt Name: Lara Antonio  MRN: [id number]  Birthdate 1940  Date of evaluation: 7/27/2024  Provider: Steven Palacio MD      HISTORY OF PRESENT ILLNESS      [facility]  Patient presenting for suture removal.  Cut her right forearm on a piece of glass on the 17th.  No significant pain.  No drainage.  No fever.  No complaints overall      Nursing Notes were reviewed.    REVIEW OF SYSTEMS         Review of Systems  All systems reviewed are negative less otherwise document in the HPI      PAST MEDICAL HISTORY     Past Medical History:   Diagnosis Date    Acute blood loss anemia 2/8/2021    Anxiety     Cancer (HCC)     Skin of face.    Cataracts, bilateral     Chronic back pain     x1 yr;seeing Chiropractor for a year    Diabetes (HCC)     Diverticulitis 6/20/2022    Dizziness of unknown cause 3/10/2016    Fall 6/20/2022    GERD (gastroesophageal reflux disease)     Gout     Hypercholesterolemia     Hypertension     Hypothyroidism     Migraine     Shingles          SURGICAL HISTORY       Past Surgical History:   Procedure Laterality Date    CATARACT REMOVAL Bilateral 05/2017    CHOLECYSTECTOMY      Open cholecystectomy.    COLONOSCOPY  06/07/2012    COLONOSCOPY  05/30/2010    COLONOSCOPY N/A 2/9/2021    COLONOSCOPY performed by Giancarlo Nicholas MD at [facility] ENDOSCOPY    DILATION AND CURETTAGE OF UTERUS      DILATION AND CURETTAGE OF UTERUS      HERNIA REPAIR      Umbilical herniorrhaphy with mesh.    OTHER SURGICAL HISTORY  02/2021    blood transfusion    TONSILLECTOMY      TONSILLECTOMY      UPPER GASTROINTESTINAL ENDOSCOPY  06/07/2012    Ulcers in Antrum          CURRENT MEDICATIONS       Previous Medications    ALLOPURINOL (ZYLOPRIM) 100 MG TABLET    Take 1 tablet by mouth daily    ASPIRIN 81 MG EC TABLET    Take 1 tablet by mouth    ATENOLOL (TENORMIN) 25 MG TABLET    take 1 tablet by mouth once daily    COENZYME Q10 100 MG CAPS CAPSULE    Take 1 capsule by mouth

## 2024-07-27 NOTE — ED TRIAGE NOTES
Pt comes to ED to get sutures in right forearm removed. They were placed on 07/17  Patient arrives to ED ambulatory w/o difficulty. No acute distress noted in triage. A&O x 4. Skin is warm, dry & intact on obs.

## 2024-08-08 ENCOUNTER — PREP FOR PROCEDURE (OUTPATIENT)
Age: 84
End: 2024-08-08

## 2024-08-08 ENCOUNTER — TELEPHONE (OUTPATIENT)
Age: 84
End: 2024-08-08

## 2024-08-08 DIAGNOSIS — H90.3 SENSORINEURAL HEARING LOSS (SNHL) OF BOTH EARS: ICD-10-CM

## 2024-08-08 DIAGNOSIS — H92.02 ACUTE OTALGIA, LEFT: ICD-10-CM

## 2024-08-08 DIAGNOSIS — D49.0: ICD-10-CM

## 2024-08-08 NOTE — TELEPHONE ENCOUNTER
Called patient to schedule her procedure. Patient is now scheduled for October 7th at [facility] with a post op that following week on October 14th. Patients aware of her scheduled procedure date and post op date and time as well

## 2024-08-16 ENCOUNTER — TELEPHONE (OUTPATIENT)
Age: 84
End: 2024-08-16

## 2024-08-16 NOTE — TELEPHONE ENCOUNTER
Called patient to see if she'll like to have her scheduled surgery moved up. Patient agreed and is now scheduled for 9/13. Patient is aware of her surgery date and post op date and times as well.

## 2024-08-19 RX ORDER — MECLIZINE HYDROCHLORIDE 25 MG/1
25 TABLET ORAL 3 TIMES DAILY PRN
OUTPATIENT
Start: 2024-08-19

## 2024-08-19 NOTE — TELEPHONE ENCOUNTER
Patient was informed of reason we could not call in medication. Patient stated that since she was dizzy she called  because this was given before. I informed patient again that since previous pcp discontinued medication she would need to be reevaluated. Patient stated she would drive I advised patient not to drive if she was feeling dizzy but patient stated that there was no other way since we could not do anything for her. Patient disconnected call.

## 2024-08-19 NOTE — TELEPHONE ENCOUNTER
She was prescribed this several years ago by her previous PCP but it was stopped in 5/2023. She would need an appt to be evaluated and discuss this. Unfortunately we are already double-booked every day this week and given her age and medical history I would not want her to wait so I would recommend urgent care.     Thanks!

## 2024-08-19 NOTE — TELEPHONE ENCOUNTER
Patient is calling requesting her medication: meclizine 25mg tab to be sent to her pharmacy [id number] - [place], [place] - [address] - P [phone] - F [phone] [[id number]]  for her possibly dealing with vertigo as she states she cannot get up and function for awhile --she can be reached at [phone].

## 2024-08-20 ENCOUNTER — APPOINTMENT (OUTPATIENT)
Facility: HOSPITAL | Age: 84
End: 2024-08-20
Payer: MEDICARE

## 2024-08-20 ENCOUNTER — HOSPITAL ENCOUNTER (OUTPATIENT)
Facility: HOSPITAL | Age: 84
Setting detail: OBSERVATION
Discharge: HOME | End: 2024-08-22
Attending: STUDENT IN AN ORGANIZED HEALTH CARE EDUCATION/TRAINING PROGRAM | Admitting: HOSPITALIST
Payer: MEDICARE

## 2024-08-20 DIAGNOSIS — R42 DIZZINESS: ICD-10-CM

## 2024-08-20 DIAGNOSIS — R29.898 TEMPORARY WEAKNESS OF EXTREMITY: ICD-10-CM

## 2024-08-20 DIAGNOSIS — R29.90 STROKE-LIKE SYMPTOMS: Primary | ICD-10-CM

## 2024-08-20 DIAGNOSIS — G45.9 TIA (TRANSIENT ISCHEMIC ATTACK): ICD-10-CM

## 2024-08-20 PROBLEM — N18.30 CKD (CHRONIC KIDNEY DISEASE) STAGE 3, GFR 30-59 ML/MIN (HCC): Status: ACTIVE | Noted: 2024-08-20

## 2024-08-20 PROBLEM — R26.81 UNSTEADY GAIT: Status: ACTIVE | Noted: 2024-08-20

## 2024-08-20 LAB
ALBUMIN SERPL-MCNC: 3.8 G/DL (ref 3.5–5.2)
ALBUMIN/GLOB SERPL: 1.5 (ref 1.1–2.2)
ALP SERPL-CCNC: 71 U/L (ref 35–104)
ALT SERPL-CCNC: 15 U/L (ref 10–35)
ANION GAP SERPL CALC-SCNC: 11 MMOL/L (ref 5–15)
APPEARANCE UR: CLEAR
AST SERPL-CCNC: 18 U/L (ref 10–35)
BACTERIA URNS QL MICRO: NEGATIVE /HPF
BASOPHILS # BLD: 0 K/UL (ref 0–1)
BASOPHILS NFR BLD: 1 % (ref 0–1)
BILIRUB SERPL-MCNC: 0.2 MG/DL (ref 0.2–1)
BILIRUB UR QL: NEGATIVE
BUN SERPL-MCNC: 26 MG/DL (ref 8–23)
BUN/CREAT SERPL: 23 (ref 12–20)
CALCIUM SERPL-MCNC: 9.3 MG/DL (ref 8.8–10.2)
CHLORIDE SERPL-SCNC: 106 MMOL/L (ref 98–107)
CO2 SERPL-SCNC: 26 MMOL/L (ref 22–29)
COLOR UR: NORMAL
CREAT SERPL-MCNC: 1.13 MG/DL (ref 0.5–0.9)
DIFFERENTIAL METHOD BLD: ABNORMAL
EOSINOPHIL # BLD: 0.3 K/UL (ref 0–0.4)
EOSINOPHIL NFR BLD: 5 %
EPITH CASTS URNS QL MICRO: NORMAL /LPF
ERYTHROCYTE [DISTWIDTH] IN BLOOD BY AUTOMATED COUNT: 12.9 % (ref 11.5–14.5)
GLOBULIN SER CALC-MCNC: 2.5 G/DL (ref 2–4)
GLUCOSE BLD STRIP.AUTO-MCNC: 177 MG/DL (ref 65–117)
GLUCOSE SERPL-MCNC: 124 MG/DL (ref 65–100)
GLUCOSE UR STRIP.AUTO-MCNC: NEGATIVE MG/DL
HCT VFR BLD AUTO: 35.4 % (ref 35–47)
HGB BLD-MCNC: 11.8 G/DL (ref 11.5–16)
HGB UR QL STRIP: NEGATIVE
IMM GRANULOCYTES # BLD AUTO: 0 K/UL (ref 0–0.04)
IMM GRANULOCYTES NFR BLD AUTO: 1 % (ref 0–0.5)
KETONES UR QL STRIP.AUTO: NEGATIVE MG/DL
LEUKOCYTE ESTERASE UR QL STRIP.AUTO: NEGATIVE
LYMPHOCYTES # BLD: 1.8 K/UL (ref 0.8–3.5)
LYMPHOCYTES NFR BLD: 31 % (ref 12–49)
MAGNESIUM SERPL-MCNC: 1.5 MG/DL (ref 1.6–2.4)
MCH RBC QN AUTO: 32.4 PG (ref 26–34)
MCHC RBC AUTO-ENTMCNC: 33.3 G/DL (ref 30–36.5)
MCV RBC AUTO: 97.3 FL (ref 80–99)
MONOCYTES # BLD: 0.7 K/UL (ref 0–1)
MONOCYTES NFR BLD: 12 % (ref 5–13)
NEUTS SEG # BLD: 3.1 K/UL (ref 1.8–8)
NEUTS SEG NFR BLD: 50 % (ref 32–75)
NITRITE UR QL STRIP.AUTO: NEGATIVE
NRBC # BLD: 0 K/UL (ref 0–0.01)
NRBC BLD-RTO: 0 PER 100 WBC
PH UR STRIP: 5.5 (ref 5–8)
PLATELET # BLD AUTO: 189 K/UL (ref 150–400)
PMV BLD AUTO: 10 FL (ref 8.9–12.9)
POTASSIUM SERPL-SCNC: 4.8 MMOL/L (ref 3.5–5.1)
PROT SERPL-MCNC: 6.3 G/DL (ref 6.4–8.3)
PROT UR STRIP-MCNC: NEGATIVE MG/DL
RBC # BLD AUTO: 3.64 M/UL (ref 3.8–5.2)
RBC #/AREA URNS HPF: NORMAL /HPF
SERVICE CMNT-IMP: ABNORMAL
SODIUM SERPL-SCNC: 143 MMOL/L (ref 136–145)
SP GR UR REFRACTOMETRY: 1.02 (ref 1–1.03)
TROPONIN T SERPL HS-MCNC: 10.4 NG/L (ref 0–14)
TSH SERPL DL<=0.05 MIU/L-ACNC: 2.25 UIU/ML (ref 0.27–4.2)
UROBILINOGEN UR QL STRIP.AUTO: 0.2 EU/DL (ref 0.2–1)
WBC # BLD AUTO: 5.9 K/UL (ref 3.6–11)
WBC URNS QL MICRO: NORMAL /HPF (ref 0–4)

## 2024-08-20 PROCEDURE — 2580000003 HC RX 258: Performed by: INTERNAL MEDICINE

## 2024-08-20 PROCEDURE — 84443 ASSAY THYROID STIM HORMONE: CPT

## 2024-08-20 PROCEDURE — 83735 ASSAY OF MAGNESIUM: CPT

## 2024-08-20 PROCEDURE — 6370000000 HC RX 637 (ALT 250 FOR IP): Performed by: INTERNAL MEDICINE

## 2024-08-20 PROCEDURE — G0378 HOSPITAL OBSERVATION PER HR: HCPCS

## 2024-08-20 PROCEDURE — 6370000000 HC RX 637 (ALT 250 FOR IP): Performed by: STUDENT IN AN ORGANIZED HEALTH CARE EDUCATION/TRAINING PROGRAM

## 2024-08-20 PROCEDURE — 70450 CT HEAD/BRAIN W/O DYE: CPT

## 2024-08-20 PROCEDURE — 81001 URINALYSIS AUTO W/SCOPE: CPT

## 2024-08-20 PROCEDURE — 82962 GLUCOSE BLOOD TEST: CPT

## 2024-08-20 PROCEDURE — 71045 X-RAY EXAM CHEST 1 VIEW: CPT

## 2024-08-20 PROCEDURE — 93005 ELECTROCARDIOGRAM TRACING: CPT | Performed by: STUDENT IN AN ORGANIZED HEALTH CARE EDUCATION/TRAINING PROGRAM

## 2024-08-20 PROCEDURE — 36415 COLL VENOUS BLD VENIPUNCTURE: CPT

## 2024-08-20 PROCEDURE — 70498 CT ANGIOGRAPHY NECK: CPT

## 2024-08-20 PROCEDURE — 84484 ASSAY OF TROPONIN QUANT: CPT

## 2024-08-20 PROCEDURE — 80053 COMPREHEN METABOLIC PANEL: CPT

## 2024-08-20 PROCEDURE — 6360000004 HC RX CONTRAST MEDICATION: Performed by: STUDENT IN AN ORGANIZED HEALTH CARE EDUCATION/TRAINING PROGRAM

## 2024-08-20 PROCEDURE — 6360000002 HC RX W HCPCS: Performed by: INTERNAL MEDICINE

## 2024-08-20 PROCEDURE — 96365 THER/PROPH/DIAG IV INF INIT: CPT

## 2024-08-20 PROCEDURE — 85025 COMPLETE CBC W/AUTO DIFF WBC: CPT

## 2024-08-20 PROCEDURE — 99285 EMERGENCY DEPT VISIT HI MDM: CPT

## 2024-08-20 PROCEDURE — 73590 X-RAY EXAM OF LOWER LEG: CPT

## 2024-08-20 RX ORDER — ASPIRIN 81 MG/1
81 TABLET, CHEWABLE ORAL DAILY
Status: DISCONTINUED | OUTPATIENT
Start: 2024-08-21 | End: 2024-08-22 | Stop reason: HOSPADM

## 2024-08-20 RX ORDER — SODIUM CHLORIDE 0.9 % (FLUSH) 0.9 %
5-40 SYRINGE (ML) INJECTION PRN
Status: DISCONTINUED | OUTPATIENT
Start: 2024-08-20 | End: 2024-08-22 | Stop reason: HOSPADM

## 2024-08-20 RX ORDER — ONDANSETRON 2 MG/ML
4 INJECTION INTRAMUSCULAR; INTRAVENOUS EVERY 6 HOURS PRN
Status: DISCONTINUED | OUTPATIENT
Start: 2024-08-20 | End: 2024-08-22 | Stop reason: HOSPADM

## 2024-08-20 RX ORDER — ATENOLOL 25 MG/1
25 TABLET ORAL DAILY
Status: DISCONTINUED | OUTPATIENT
Start: 2024-08-21 | End: 2024-08-22 | Stop reason: HOSPADM

## 2024-08-20 RX ORDER — LEVOTHYROXINE SODIUM 50 UG/1
50 TABLET ORAL
Status: DISCONTINUED | OUTPATIENT
Start: 2024-08-21 | End: 2024-08-22 | Stop reason: HOSPADM

## 2024-08-20 RX ORDER — ALLOPURINOL 100 MG/1
100 TABLET ORAL DAILY
Status: DISCONTINUED | OUTPATIENT
Start: 2024-08-21 | End: 2024-08-22 | Stop reason: HOSPADM

## 2024-08-20 RX ORDER — SODIUM CHLORIDE 9 MG/ML
INJECTION, SOLUTION INTRAVENOUS PRN
Status: DISCONTINUED | OUTPATIENT
Start: 2024-08-20 | End: 2024-08-22 | Stop reason: HOSPADM

## 2024-08-20 RX ORDER — ATORVASTATIN CALCIUM 20 MG/1
40 TABLET, FILM COATED ORAL NIGHTLY
Status: DISCONTINUED | OUTPATIENT
Start: 2024-08-20 | End: 2024-08-22 | Stop reason: HOSPADM

## 2024-08-20 RX ORDER — MECLIZINE HCL 12.5 MG 12.5 MG/1
12.5 TABLET ORAL
Status: COMPLETED | OUTPATIENT
Start: 2024-08-20 | End: 2024-08-20

## 2024-08-20 RX ORDER — IOPAMIDOL 755 MG/ML
100 INJECTION, SOLUTION INTRAVASCULAR ONCE
Status: COMPLETED | OUTPATIENT
Start: 2024-08-20 | End: 2024-08-20

## 2024-08-20 RX ORDER — ASPIRIN 300 MG/1
300 SUPPOSITORY RECTAL DAILY
Status: DISCONTINUED | OUTPATIENT
Start: 2024-08-21 | End: 2024-08-22 | Stop reason: HOSPADM

## 2024-08-20 RX ORDER — LATANOPROST 50 UG/ML
1 SOLUTION/ DROPS OPHTHALMIC NIGHTLY
Status: DISCONTINUED | OUTPATIENT
Start: 2024-08-20 | End: 2024-08-22 | Stop reason: HOSPADM

## 2024-08-20 RX ORDER — MAGNESIUM SULFATE IN WATER 40 MG/ML
2000 INJECTION, SOLUTION INTRAVENOUS ONCE
Status: COMPLETED | OUTPATIENT
Start: 2024-08-20 | End: 2024-08-21

## 2024-08-20 RX ORDER — INSULIN LISPRO 100 [IU]/ML
0-8 INJECTION, SOLUTION INTRAVENOUS; SUBCUTANEOUS
Status: DISCONTINUED | OUTPATIENT
Start: 2024-08-21 | End: 2024-08-22 | Stop reason: HOSPADM

## 2024-08-20 RX ORDER — SODIUM CHLORIDE 0.9 % (FLUSH) 0.9 %
5-40 SYRINGE (ML) INJECTION EVERY 12 HOURS SCHEDULED
Status: DISCONTINUED | OUTPATIENT
Start: 2024-08-20 | End: 2024-08-22 | Stop reason: HOSPADM

## 2024-08-20 RX ORDER — INSULIN LISPRO 100 [IU]/ML
0-4 INJECTION, SOLUTION INTRAVENOUS; SUBCUTANEOUS NIGHTLY
Status: DISCONTINUED | OUTPATIENT
Start: 2024-08-20 | End: 2024-08-22 | Stop reason: HOSPADM

## 2024-08-20 RX ORDER — ONDANSETRON 4 MG/1
4 TABLET, ORALLY DISINTEGRATING ORAL EVERY 8 HOURS PRN
Status: DISCONTINUED | OUTPATIENT
Start: 2024-08-20 | End: 2024-08-22 | Stop reason: HOSPADM

## 2024-08-20 RX ORDER — POLYETHYLENE GLYCOL 3350 17 G/17G
17 POWDER, FOR SOLUTION ORAL DAILY PRN
Status: DISCONTINUED | OUTPATIENT
Start: 2024-08-20 | End: 2024-08-22 | Stop reason: HOSPADM

## 2024-08-20 RX ADMIN — SODIUM CHLORIDE: 9 INJECTION, SOLUTION INTRAVENOUS at 23:05

## 2024-08-20 RX ADMIN — SODIUM CHLORIDE, PRESERVATIVE FREE 10 ML: 5 INJECTION INTRAVENOUS at 23:09

## 2024-08-20 RX ADMIN — ATORVASTATIN CALCIUM 40 MG: 20 TABLET, FILM COATED ORAL at 23:09

## 2024-08-20 RX ADMIN — MECLIZINE 12.5 MG: 12.5 TABLET ORAL at 17:55

## 2024-08-20 RX ADMIN — IOPAMIDOL 100 ML: 755 INJECTION, SOLUTION INTRAVENOUS at 16:55

## 2024-08-20 RX ADMIN — MAGNESIUM SULFATE HEPTAHYDRATE 2000 MG: 40 INJECTION, SOLUTION INTRAVENOUS at 23:06

## 2024-08-20 ASSESSMENT — ENCOUNTER SYMPTOMS
ABDOMINAL PAIN: 0
SHORTNESS OF BREATH: 0

## 2024-08-20 NOTE — ED NOTES
Bedside and Verbal shift change report given to Sary RN (oncoming nurse) by Selena RN (offgoing nurse). Report included the following information Nurse Handoff Report, ED Encounter Summary, ED SBAR, Adult Overview, MAR, Recent Results, Med Rec Status, and Cardiac Rhythm NSR .

## 2024-08-20 NOTE — ED NOTES
Pt placed in gown. Med rec attempted. Pt unable to provide information on all meds at this time. Placed on monitor x3. Awaiting bed placement at this time.

## 2024-08-20 NOTE — ED TRIAGE NOTES
PT arrives ambulatory w/ 3-4 day hx of dizziness that worsens when she lays flat.     PT denies focal weaknesses or deficits    PT hx of CKD, DM2, Vertigo, oral mass     PT also wants bruise on leg looked at.

## 2024-08-20 NOTE — ED PROVIDER NOTES
[facility] [place]ERGENCY DEPT  EMERGENCY DEPARTMENT ENCOUNTER      Pt Name: Lara Antonio  MRN: [id number]  Birthdate 1940  Date of evaluation: 8/20/2024  Provider: Ferdinand Wong DO    CHIEF COMPLAINT       Chief Complaint   Patient presents with    Dizziness         HISTORY OF PRESENT ILLNESS   (Location/Symptom, Timing/Onset, Context/Setting, Quality, Duration, Modifying Factors, Severity)  Note limiting factors.   82-year-old female presents to the ED for evaluation of several symptoms.  Patient reports over the last 4 days that she has had some dizziness and difficulty with her balance.  This has been intermittent, patient reports that when she gets up she feels lightheaded and does have a spinning sensation of the room but also feels like her balance is off when she walks and that her body is moving not related to where she wants to go.  She also reports some intermittent left arm and leg weakness.  Not currently having symptoms.  Does have a history of vertigo diabetes chronic kidney disease.  Patient also has a bruise to her left lower extremity.  Denies any trauma.  Patient currently on aspirin, denies any anticoagulant use.            Review of External Medical Records:     Nursing Notes were reviewed.    REVIEW OF SYSTEMS    (2-9 systems for level 4, 10 or more for level 5)     Review of Systems   Constitutional:  Negative for fever.   Respiratory:  Negative for shortness of breath.    Cardiovascular:  Negative for chest pain.   Gastrointestinal:  Negative for abdominal pain.   Skin:         Bruise to left lower leg   Neurological:  Positive for dizziness and weakness. Negative for speech difficulty and numbness.       Except as noted above the remainder of the review of systems was reviewed and negative.       PAST MEDICAL HISTORY     Past Medical History:   Diagnosis Date    Acute blood loss anemia 2/8/2021    Anxiety     Cancer (HCC)     Skin of face.    Cataracts, bilateral     Chronic back pain

## 2024-08-20 NOTE — ED NOTES
Verbal shift change report given to SHARI Okeefe (oncoming nurse) by SHARI Walters (offgoing nurse). Report included the following information Nurse Handoff Report, Index, ED Encounter Summary, ED SBAR, Adult Overview, Intake/Output, MAR, Recent Results, and Neuro Assessment.

## 2024-08-21 ENCOUNTER — APPOINTMENT (OUTPATIENT)
Facility: HOSPITAL | Age: 84
End: 2024-08-21
Attending: INTERNAL MEDICINE
Payer: MEDICARE

## 2024-08-21 ENCOUNTER — APPOINTMENT (OUTPATIENT)
Facility: HOSPITAL | Age: 84
End: 2024-08-21
Payer: MEDICARE

## 2024-08-21 ENCOUNTER — TELEPHONE (OUTPATIENT)
Facility: CLINIC | Age: 84
End: 2024-08-21

## 2024-08-21 LAB
CHOLEST SERPL-MCNC: 134 MG/DL
ECHO AO ARCH DIAM: 2.2 CM
ECHO AO ASC DIAM: 2.9 CM
ECHO AO ASCENDING AORTA INDEX: 1.74 CM/M2
ECHO AO ROOT DIAM: 2.9 CM
ECHO AO ROOT INDEX: 1.74 CM/M2
ECHO AV AREA PEAK VELOCITY: 1.9 CM2
ECHO AV AREA VTI: 1.5 CM2
ECHO AV AREA/BSA PEAK VELOCITY: 1.1 CM2/M2
ECHO AV AREA/BSA VTI: 0.9 CM2/M2
ECHO AV MEAN GRADIENT: 3 MMHG
ECHO AV MEAN VELOCITY: 0.9 M/S
ECHO AV PEAK GRADIENT: 7 MMHG
ECHO AV PEAK VELOCITY: 1.3 M/S
ECHO AV VELOCITY RATIO: 0.62
ECHO AV VTI: 32.1 CM
ECHO BSA: 1.71 M2
ECHO EST RA PRESSURE: 3 MMHG
ECHO LA DIAMETER INDEX: 1.8 CM/M2
ECHO LA DIAMETER: 3 CM
ECHO LA TO AORTIC ROOT RATIO: 1.03
ECHO LA VOL A-L A2C: 22 ML (ref 22–52)
ECHO LA VOL A-L A4C: 19 ML (ref 22–52)
ECHO LA VOL BP: 19 ML (ref 22–52)
ECHO LA VOL MOD A2C: 20 ML (ref 22–52)
ECHO LA VOL MOD A4C: 17 ML (ref 22–52)
ECHO LA VOL/BSA BIPLANE: 11 ML/M2 (ref 16–34)
ECHO LA VOLUME AREA LENGTH: 21 ML
ECHO LA VOLUME INDEX A-L A2C: 13 ML/M2 (ref 16–34)
ECHO LA VOLUME INDEX A-L A4C: 11 ML/M2 (ref 16–34)
ECHO LA VOLUME INDEX AREA LENGTH: 13 ML/M2 (ref 16–34)
ECHO LA VOLUME INDEX MOD A2C: 12 ML/M2 (ref 16–34)
ECHO LA VOLUME INDEX MOD A4C: 10 ML/M2 (ref 16–34)
ECHO LV E' LATERAL VELOCITY: 5 CM/S
ECHO LV E' SEPTAL VELOCITY: 6 CM/S
ECHO LV EDV A2C: 28 ML
ECHO LV EDV A4C: 48 ML
ECHO LV EDV BP: 37 ML (ref 56–104)
ECHO LV EDV INDEX A4C: 29 ML/M2
ECHO LV EDV INDEX BP: 22 ML/M2
ECHO LV EDV NDEX A2C: 17 ML/M2
ECHO LV EJECTION FRACTION A2C: 40 %
ECHO LV EJECTION FRACTION A4C: 58 %
ECHO LV EJECTION FRACTION BIPLANE: 50 % (ref 55–100)
ECHO LV ESV A2C: 17 ML
ECHO LV ESV A4C: 20 ML
ECHO LV ESV BP: 19 ML (ref 19–49)
ECHO LV ESV INDEX A2C: 10 ML/M2
ECHO LV ESV INDEX A4C: 12 ML/M2
ECHO LV ESV INDEX BP: 11 ML/M2
ECHO LV FRACTIONAL SHORTENING: 31 % (ref 28–44)
ECHO LV INTERNAL DIMENSION DIASTOLE INDEX: 2.51 CM/M2
ECHO LV INTERNAL DIMENSION DIASTOLIC: 4.2 CM (ref 3.9–5.3)
ECHO LV INTERNAL DIMENSION SYSTOLIC INDEX: 1.74 CM/M2
ECHO LV INTERNAL DIMENSION SYSTOLIC: 2.9 CM
ECHO LV IVSD: 0.6 CM (ref 0.6–0.9)
ECHO LV MASS 2D: 70 G (ref 67–162)
ECHO LV MASS INDEX 2D: 41.9 G/M2 (ref 43–95)
ECHO LV POSTERIOR WALL DIASTOLIC: 0.6 CM (ref 0.6–0.9)
ECHO LV RELATIVE WALL THICKNESS RATIO: 0.29
ECHO LVOT AREA: 3.1 CM2
ECHO LVOT AV VTI INDEX: 0.49
ECHO LVOT DIAM: 2 CM
ECHO LVOT MEAN GRADIENT: 1 MMHG
ECHO LVOT PEAK GRADIENT: 2 MMHG
ECHO LVOT PEAK VELOCITY: 0.8 M/S
ECHO LVOT STROKE VOLUME INDEX: 29.7 ML/M2
ECHO LVOT SV: 49.6 ML
ECHO LVOT VTI: 15.8 CM
ECHO MV A VELOCITY: 0.88 M/S
ECHO MV E DECELERATION TIME (DT): 229.4 MS
ECHO MV E VELOCITY: 0.81 M/S
ECHO MV E/A RATIO: 0.92
ECHO MV E/E' LATERAL: 16.2
ECHO MV E/E' RATIO (AVERAGED): 14.85
ECHO MV E/E' SEPTAL: 13.5
ECHO PULMONARY ARTERY END DIASTOLIC PRESSURE: 2 MMHG
ECHO PV MAX VELOCITY: 1 M/S
ECHO PV PEAK GRADIENT: 4 MMHG
ECHO PV REGURGITANT MAX VELOCITY: 0.7 M/S
ECHO RIGHT VENTRICULAR SYSTOLIC PRESSURE (RVSP): 37 MMHG
ECHO RV FREE WALL PEAK S': 10 CM/S
ECHO RV INTERNAL DIMENSION: 3.6 CM
ECHO RV TAPSE: 2.1 CM (ref 1.7–?)
ECHO TV REGURGITANT MAX VELOCITY: 2.93 M/S
ECHO TV REGURGITANT PEAK GRADIENT: 34 MMHG
EKG ATRIAL RATE: 70 BPM
EKG DIAGNOSIS: NORMAL
EKG P AXIS: 15 DEGREES
EKG P-R INTERVAL: 144 MS
EKG Q-T INTERVAL: 372 MS
EKG QRS DURATION: 78 MS
EKG QTC CALCULATION (BAZETT): 401 MS
EKG R AXIS: 69 DEGREES
EKG T AXIS: 74 DEGREES
EKG VENTRICULAR RATE: 70 BPM
ERYTHROCYTE [DISTWIDTH] IN BLOOD BY AUTOMATED COUNT: 13 % (ref 11.5–14.5)
EST. AVERAGE GLUCOSE BLD GHB EST-MCNC: 128 MG/DL
GLUCOSE BLD STRIP.AUTO-MCNC: 110 MG/DL (ref 65–117)
GLUCOSE BLD STRIP.AUTO-MCNC: 112 MG/DL (ref 65–117)
GLUCOSE BLD STRIP.AUTO-MCNC: 126 MG/DL (ref 65–117)
GLUCOSE BLD STRIP.AUTO-MCNC: 166 MG/DL (ref 65–117)
HBA1C MFR BLD: 6.1 % (ref 4–5.6)
HCT VFR BLD AUTO: 36 % (ref 35–47)
HDLC SERPL-MCNC: 47 MG/DL
HDLC SERPL: 2.9 (ref 0–5)
HGB BLD-MCNC: 12.3 G/DL (ref 11.5–16)
LDLC SERPL CALC-MCNC: 56.2 MG/DL (ref 0–100)
MCH RBC QN AUTO: 32.7 PG (ref 26–34)
MCHC RBC AUTO-ENTMCNC: 34.2 G/DL (ref 30–36.5)
MCV RBC AUTO: 95.7 FL (ref 80–99)
NRBC # BLD: 0 K/UL (ref 0–0.01)
NRBC BLD-RTO: 0 PER 100 WBC
PLATELET # BLD AUTO: 183 K/UL (ref 150–400)
PMV BLD AUTO: 10.2 FL (ref 8.9–12.9)
RBC # BLD AUTO: 3.76 M/UL (ref 3.8–5.2)
SERVICE CMNT-IMP: ABNORMAL
SERVICE CMNT-IMP: ABNORMAL
SERVICE CMNT-IMP: NORMAL
SERVICE CMNT-IMP: NORMAL
TRIGL SERPL-MCNC: 154 MG/DL
VLDLC SERPL CALC-MCNC: 30.8 MG/DL
WBC # BLD AUTO: 5.7 K/UL (ref 3.6–11)

## 2024-08-21 PROCEDURE — 6370000000 HC RX 637 (ALT 250 FOR IP): Performed by: INTERNAL MEDICINE

## 2024-08-21 PROCEDURE — 6370000000 HC RX 637 (ALT 250 FOR IP): Performed by: STUDENT IN AN ORGANIZED HEALTH CARE EDUCATION/TRAINING PROGRAM

## 2024-08-21 PROCEDURE — 80061 LIPID PANEL: CPT

## 2024-08-21 PROCEDURE — 70551 MRI BRAIN STEM W/O DYE: CPT

## 2024-08-21 PROCEDURE — 83036 HEMOGLOBIN GLYCOSYLATED A1C: CPT

## 2024-08-21 PROCEDURE — 97116 GAIT TRAINING THERAPY: CPT

## 2024-08-21 PROCEDURE — 99223 1ST HOSP IP/OBS HIGH 75: CPT | Performed by: NURSE PRACTITIONER

## 2024-08-21 PROCEDURE — 82962 GLUCOSE BLOOD TEST: CPT

## 2024-08-21 PROCEDURE — 93306 TTE W/DOPPLER COMPLETE: CPT

## 2024-08-21 PROCEDURE — 93306 TTE W/DOPPLER COMPLETE: CPT | Performed by: STUDENT IN AN ORGANIZED HEALTH CARE EDUCATION/TRAINING PROGRAM

## 2024-08-21 PROCEDURE — 97165 OT EVAL LOW COMPLEX 30 MIN: CPT

## 2024-08-21 PROCEDURE — 2580000003 HC RX 258: Performed by: INTERNAL MEDICINE

## 2024-08-21 PROCEDURE — 96366 THER/PROPH/DIAG IV INF ADDON: CPT

## 2024-08-21 PROCEDURE — 92610 EVALUATE SWALLOWING FUNCTION: CPT

## 2024-08-21 PROCEDURE — 96372 THER/PROPH/DIAG INJ SC/IM: CPT

## 2024-08-21 PROCEDURE — 6370000000 HC RX 637 (ALT 250 FOR IP)

## 2024-08-21 PROCEDURE — G0378 HOSPITAL OBSERVATION PER HR: HCPCS

## 2024-08-21 PROCEDURE — 97535 SELF CARE MNGMENT TRAINING: CPT

## 2024-08-21 PROCEDURE — 97161 PT EVAL LOW COMPLEX 20 MIN: CPT

## 2024-08-21 PROCEDURE — 93010 ELECTROCARDIOGRAM REPORT: CPT | Performed by: SPECIALIST

## 2024-08-21 PROCEDURE — 85027 COMPLETE CBC AUTOMATED: CPT

## 2024-08-21 PROCEDURE — 36415 COLL VENOUS BLD VENIPUNCTURE: CPT

## 2024-08-21 PROCEDURE — 6360000002 HC RX W HCPCS: Performed by: STUDENT IN AN ORGANIZED HEALTH CARE EDUCATION/TRAINING PROGRAM

## 2024-08-21 PROCEDURE — 97530 THERAPEUTIC ACTIVITIES: CPT

## 2024-08-21 PROCEDURE — 94761 N-INVAS EAR/PLS OXIMETRY MLT: CPT

## 2024-08-21 RX ORDER — ENOXAPARIN SODIUM 100 MG/ML
40 INJECTION SUBCUTANEOUS DAILY
Status: DISCONTINUED | OUTPATIENT
Start: 2024-08-21 | End: 2024-08-22 | Stop reason: HOSPADM

## 2024-08-21 RX ORDER — FLUTICASONE PROPIONATE 50 MCG
1 SPRAY, SUSPENSION (ML) NASAL DAILY PRN
Status: DISCONTINUED | OUTPATIENT
Start: 2024-08-21 | End: 2024-08-22 | Stop reason: HOSPADM

## 2024-08-21 RX ORDER — BUTALBITAL, ACETAMINOPHEN AND CAFFEINE 50; 325; 40 MG/1; MG/1; MG/1
1 TABLET ORAL ONCE
Status: COMPLETED | OUTPATIENT
Start: 2024-08-21 | End: 2024-08-21

## 2024-08-21 RX ORDER — ACETAMINOPHEN 325 MG/1
650 TABLET ORAL EVERY 4 HOURS PRN
Status: DISCONTINUED | OUTPATIENT
Start: 2024-08-21 | End: 2024-08-22 | Stop reason: HOSPADM

## 2024-08-21 RX ORDER — BUTALBITAL, ACETAMINOPHEN AND CAFFEINE 50; 325; 40 MG/1; MG/1; MG/1
1 TABLET ORAL EVERY 4 HOURS PRN
Status: DISCONTINUED | OUTPATIENT
Start: 2024-08-21 | End: 2024-08-21

## 2024-08-21 RX ORDER — SIMETHICONE 80 MG
80 TABLET,CHEWABLE ORAL EVERY 6 HOURS PRN
Status: DISCONTINUED | OUTPATIENT
Start: 2024-08-21 | End: 2024-08-22 | Stop reason: HOSPADM

## 2024-08-21 RX ADMIN — SIMETHICONE 80 MG: 80 TABLET, CHEWABLE ORAL at 22:09

## 2024-08-21 RX ADMIN — LEVOTHYROXINE SODIUM 50 MCG: 0.05 TABLET ORAL at 07:46

## 2024-08-21 RX ADMIN — ASPIRIN 81 MG: 81 TABLET, CHEWABLE ORAL at 07:46

## 2024-08-21 RX ADMIN — ATORVASTATIN CALCIUM 40 MG: 20 TABLET, FILM COATED ORAL at 21:46

## 2024-08-21 RX ADMIN — ACETAMINOPHEN 650 MG: 325 TABLET ORAL at 21:46

## 2024-08-21 RX ADMIN — LATANOPROST 1 DROP: 50 SOLUTION OPHTHALMIC at 21:46

## 2024-08-21 RX ADMIN — SODIUM CHLORIDE, PRESERVATIVE FREE 10 ML: 5 INJECTION INTRAVENOUS at 07:46

## 2024-08-21 RX ADMIN — ATENOLOL 25 MG: 25 TABLET ORAL at 07:46

## 2024-08-21 RX ADMIN — SODIUM CHLORIDE, PRESERVATIVE FREE 10 ML: 5 INJECTION INTRAVENOUS at 21:47

## 2024-08-21 RX ADMIN — BUTALBITAL, ACETAMINOPHEN, AND CAFFEINE 1 TABLET: 325; 50; 40 TABLET ORAL at 03:38

## 2024-08-21 RX ADMIN — ENOXAPARIN SODIUM 40 MG: 100 INJECTION SUBCUTANEOUS at 09:20

## 2024-08-21 RX ADMIN — ALLOPURINOL 100 MG: 100 TABLET ORAL at 07:46

## 2024-08-21 ASSESSMENT — PAIN DESCRIPTION - DESCRIPTORS: DESCRIPTORS: ACHING

## 2024-08-21 ASSESSMENT — PAIN SCALES - GENERAL
PAINLEVEL_OUTOF10: 5
PAINLEVEL_OUTOF10: 3

## 2024-08-21 ASSESSMENT — PAIN DESCRIPTION - LOCATION: LOCATION: HEAD

## 2024-08-21 NOTE — CARE COORDINATION
Care Management Initial Assessment  8/21/2024 3:39 PM  If patient is discharged prior to next notation, then this note serves as note for discharge by case management.    Reason for Admission:   TIA (transient ischemic attack) [G45.9]  Dizziness [R42]  Stroke-like symptoms [R29.90]  Temporary weakness of extremity [R29.898]         Patient Admission Status: Observation  RUR: No data recorded  Hospitalization in the last 30 days (Readmission):  No        Advance Care Planning:  Code Status: No Order  Primary Healthcare Decision Maker: (P) Legal Next of Kin  Primary Decision Maker: Selvin Antonio - Child - [phone]     _________________________________________________________________________  Assessment:      08/21/24 1537   Service Assessment   Patient Orientation Alert and Oriented   Cognition Alert   History Provided By Patient   Accompanied By/Relationship saima Montalvo   Support Systems Children   Patient's Healthcare Decision Maker is: Legal Next of Kin   PCP Verified by CM Yes   Prior Functional Level Independent in ADLs/IADLs   Current Functional Level Independent in ADLs/IADLs   Can patient return to prior living arrangement Yes   Ability to make needs known: Good   Family able to assist with home care needs: Yes   Would you like for me to discuss the discharge plan with any other family members/significant others, and if so, who? Yes  (saima Montalvo)   Financial Resources Medicare   Community Resources None   Social/Functional History   Lives With Son   Type of Home House   Home Layout Multi-level   Home Equipment Cane - Quad   Receives Help From Family   ADL Assistance Independent   Homemaking Assistance Independent   Ambulation Assistance Independent   Transfer Assistance Independent   Active  Yes   Discharge Planning   Type of Residence House   Living Arrangements Children   Potential Assistance Needed Durable Medical Equipment   Potential DME Needed Other (Comment)  (Rollator)   Potential

## 2024-08-21 NOTE — H&P
[facility]  [address] [place]  [address] [phone]    Hospital Medicine Admission History and Physical      NAME:  Lara Antonio   :   1940   MRN:  [id number]     PCP:  Pau Tavarez MD     Date of service:  2024         Subjective:     CHIEF COMPLAINT: Dizziness and unsteady gait    HISTORY OF PRESENT ILLNESS:     Ms. Antonio is a 83 y.o.   female who is admitted with dizziness.  Ms. Antonio with past medical history of HTN, DM, vertigo, hypothyroidism, gout, hyperlipidemia, glaucoma, anxiety presented to ER complaining of dizziness and unsteady gait.  Symptoms started about 3 to 4 days ago with a feeling of dizziness and the room is moving and later became unsteady in her gait.  Patient had difficulty ambulating has to hold to something while walking.  Denies weakness, numbness or tingling.  No vision change    Past Medical History:   Diagnosis Date    Acute blood loss anemia 2021    Anxiety     Cancer (HCC)     Skin of face.    Cataracts, bilateral     Chronic back pain     x1 yr;seeing Chiropractor for a year    Diabetes (HCC)     Diverticulitis 2022    Dizziness of unknown cause 3/10/2016    Fall 2022    GERD (gastroesophageal reflux disease)     Gout     Hypercholesterolemia     Hypertension     Hypothyroidism     Migraine     Shingles         Past Surgical History:   Procedure Laterality Date    CATARACT REMOVAL Bilateral 2017    CHOLECYSTECTOMY      Open cholecystectomy.    COLONOSCOPY  2012    COLONOSCOPY  2010    COLONOSCOPY N/A 2021    COLONOSCOPY performed by Giancarlo Nicholas MD at [facility] ENDOSCOPY    DILATION AND CURETTAGE OF UTERUS      DILATION AND CURETTAGE OF UTERUS      HERNIA REPAIR      Umbilical herniorrhaphy with mesh.    OTHER SURGICAL HISTORY  2021    blood transfusion    TONSILLECTOMY      TONSILLECTOMY      UPPER GASTROINTESTINAL ENDOSCOPY  2012    Ulcers in Antrum        Social  capsule Take 1 capsule by mouth daily    Provider, MD Erick   Multiple Vitamins-Minerals (PRESERVISION AREDS 2 PO) Take by mouth    Provider, Historical, MD   Lancets MISC  10/14/22   Automatic Reconciliation, Ar   aspirin 81 MG EC tablet Take 1 tablet by mouth 2/15/21   Automatic Reconciliation, Ar   coenzyme Q10 100 MG CAPS capsule Take 1 capsule by mouth daily    Automatic Reconciliation, Ar         Review of Systems:  (bold if positive, if negative)    Gen:  Eyes:  ENT:  CVS:  Pulm:  GI:  :  MS:  Skin:  Psych:  Endo:  Hem:  Renal:  Neuro:   dizziness and unsteady gait         Objective:      VITALS:    Vital signs reviewed; most recent are:    Vitals:    08/20/24 2130   BP: 130/72   Pulse: 68   Resp: 19   Temp: 97.8 °F (36.6 °C)   SpO2: 96%     SpO2 Readings from Last 6 Encounters:   08/20/24 96%   07/27/24 100%   07/17/24 96%   07/10/24 95%   07/09/24 98%   06/24/24 94%        No intake or output data in the 24 hours ending 08/20/24 2156         Exam:     Physical Exam:    Gen:  Well-developed, well-nourished, in no acute distress  HEENT:  Pink conjunctivae, PERRL, hearing intact to voice, moist mucous membranes  Neck:  Supple, without masses, thyroid non-tender  Resp:  No accessory muscle use, clear breath sounds without wheezes rales or rhonchi  Card:  No murmurs, normal S1, S2 without thrills, bruits or peripheral edema  Abd:  Soft, non-tender, non-distended, normoactive bowel sounds are present, no palpable organomegaly and no detectable hernias  Lymph:  No cervical or inguinal adenopathy  Musc:  No cyanosis or clubbing  Skin:  No rashes or ulcers, skin turgor is good  Neuro:  Cranial nerves are grossly intact, no focal motor weakness, follows commands appropriately  Psych:  Good insight, oriented to person, place and time, alert       Labs:    Recent Labs     08/20/24  1537   WBC 5.9   HGB 11.8   HCT 35.4        Recent Labs     08/20/24  1537      K 4.8      CO2 26   BUN 26*   MG

## 2024-08-21 NOTE — PROGRESS NOTES
Speech LAnguage Pathology EVALUATION/DISCHARGE    Patient: Lara Antonio (83 y.o. female)  Date: 8/21/2024  Primary Diagnosis: TIA (transient ischemic attack) [G45.9]  Dizziness [R42]  Stroke-like symptoms [R29.90]  Temporary weakness of extremity [R29.898]    Precautions: NA    ASSESSMENT:  Patient participated in clinical swallow evaluation consisting of thin liquiids and solids. Oral-motor evaluation did reveal L labial asymmetry. No motor speech or voice concerns identified. Patient demonstrated grossly functional oropharyngeal swallow based on bedside assessment. No overt clinical s/s of aspiration were appreciated with any trialed consistency. Low suspicion for acute dysphagia based on available medical history and bedside presentation this date. Recommend patient initiate a regular texture diet with thin liquids in conjunction with general swallow precautions.     Will follow for MRI Brain results and determine if further SLP evaluation indicated. Otherwise, patient will be discharged from skilled speech-language pathology services based on results of this evaluation.     PLAN :  Recommendations and Planned Interventions:  Diet: Regular and thin liquids  Oral medications whole with liquids  Upright for PO intake  Routine oral care    Acute SLP Services: No, patient will be discharged from acute skilled speech-language pathology at this time.  Discharge Recommendations: Continue to assess pending progress     SUBJECTIVE:   Patient stated, “I am swallowing just fine, right?”    OBJECTIVE:     Past Medical History:   Diagnosis Date    Acute blood loss anemia 2/8/2021    Anxiety     Cancer (HCC)     Skin of face.    Cataracts, bilateral     Chronic back pain     x1 yr;seeing Chiropractor for a year    Diabetes (HCC)     Diverticulitis 6/20/2022    Dizziness of unknown cause 3/10/2016    Fall 6/20/2022    GERD (gastroesophageal reflux disease)     Gout     Hypercholesterolemia     Hypertension     Hypothyroidism      Migraine     Shingles      Past Surgical History:   Procedure Laterality Date    CATARACT REMOVAL Bilateral 05/2017    CHOLECYSTECTOMY      Open cholecystectomy.    COLONOSCOPY  06/07/2012    COLONOSCOPY  05/30/2010    COLONOSCOPY N/A 2/9/2021    COLONOSCOPY performed by Giancarlo Nicholas MD at [facility] ENDOSCOPY    DILATION AND CURETTAGE OF UTERUS      DILATION AND CURETTAGE OF UTERUS      HERNIA REPAIR      Umbilical herniorrhaphy with mesh.    OTHER SURGICAL HISTORY  02/2021    blood transfusion    TONSILLECTOMY      TONSILLECTOMY      UPPER GASTROINTESTINAL ENDOSCOPY  06/07/2012    Ulcers in Antrum      Prior Level of Function/Home Situation:   Baseline Diet: Regular texture with thin liquids    Baseline Assessment:  Current Diet: NPO, Passed [place]/RN swallow screen  Prior Dysphagia History: Patient denies oropharyngeal dysphagia in past or present     Cognitive and Communication Status:  Neurologic State: Alert  Orientation Level: Oriented x4  Cognition: Appropriate for age attention/concentration and Follows commands    Dysphagia:  Oral Assessment:  Oral Motor   Labial: L symmetry  Dentition: Natural;Intact  Oral Hygiene: Moist;Clean  Oral Hygiene Comments: 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 Trials:  PO Trials  Assessment Method(s): Observation  Patient Position: Upright in bed  Vocal Quality: No Impairment  Consistency Presented: Thin;Regular  How Presented: Self-fed/presented;Successive Swallows;Straw  Bolus Acceptance: No impairment  Bolus Formation/Control: No impairment  Propulsion: No impairment  Oral Residue: None  Initiation of Swallow: Present  Laryngeal Elevation: Present  Aspiration Signs/Symptoms: None  Pharyngeal Phase Characteristics: Patient denies; Will await MRI to determine risk for silent aspiration    Respiratory Status/Airway:  Room air                         Outcome Measure:  Functional

## 2024-08-21 NOTE — PLAN OF CARE
Problem: Physical Therapy - Adult  Goal: By Discharge: Performs mobility at highest level of function for planned discharge setting.  See evaluation for individualized goals.  Description: FUNCTIONAL STATUS PRIOR TO ADMISSION: Patient was independent and active without use of DME.  She drives and walks without an assistive device - she has been using a quad cane since Friday.  She has a history of three falls, most recently a few months ago.      HOME SUPPORT PRIOR TO ADMISSION: The patient lived alone with her son in the cottage behind her house.  She lives in a tri-level with her bedroom on the fourth floor.    Physical Therapy Goals  Initiated 8/21/2024  1.  Patient will move from supine to sit and sit to supine in bed with independence within 7 day(s).    2.  Patient will perform sit to stand with independence within 7 day(s).  3.  Patient will transfer from bed to chair and chair to bed with independence using the least restrictive device within 7 day(s).  4.  Patient will ambulate with independence for 200 feet with the least restrictive device within 7 day(s).   5.  Patient will ascend/descend 12 stairs with handrail(s) with modified independence within 7 day(s).   Outcome: Progressing   PHYSICAL THERAPY EVALUATION    Patient: Lara Antonio (83 y.o. female)  Date: 8/21/2024  Primary Diagnosis: TIA (transient ischemic attack) [G45.9]  Dizziness [R42]  Stroke-like symptoms [R29.90]  Temporary weakness of extremity [R29.898]       Precautions:                        ASSESSMENT :   DEFICITS/IMPAIRMENTS:   The patient is limited by decreased functional mobility, activity tolerance, balance.  She was admitted with complaints of dizziness.  No current dizziness but reports feeling \"shaky on the inside\" and sometimes \"shaky on the outside\" (she reports hands shaking).  Head CT negative.  MRI of head planned for today.  She received Meclizine last night.  No nystagmus noted.  Not orthostatic (see flow sheet for                                                                                                                                                                                                   [facility] AM-PAC®      Basic Mobility Inpatient Short Form (6-Clicks) Version 2    How much help is needed turning from your back to your side while in a flat bed without using bedrails?: None  How much help is needed moving from lying on your back to sitting on the side of a flat bed without using bedrails?: None  How much help is needed moving to and from a bed to a chair?: None  How much help is needed standing up from a chair using your arms?: None  How much help is needed walking in hospital room?: A Little  How much help is needed climbing 3-5 steps with a railing?: None    AM-PAC Inpatient Mobility Raw Score : 23  -PAC Inpatient T-Scale Score : 56.93     Cutoff score ?171,2,3 had higher odds of discharging home with home health or need of SNF/IPR.    1. Carina Mcmahan, Cris Kwon, Nicolás Porter, Cherie Santillan, Deuce Bradford, Prem Mcmahan.  Validity of the -PAC “6-Clicks” Inpatient Daily Activity and Basic Mobility Short Forms. Physical Therapy Mar 2014, 94 ) [phone]; DOI: 10.2522/ptj.98218516  2. Jamaal GLEZ, Otoniel J, Conrado J, Marcus J. Association of AM-PAC \"6-Clicks\" Basic Mobility and Daily Activity Scores With Discharge Destination. Phys Ther. 2021 Apr 4;101(4):nsfu580. doi: 10.1093/ptj/ydmk499. PMID: 56376068.  3. Joss JOYCE, Casimiro D, Adore S, Yamilka K, Yohannes S. Activity Measure for Post-Acute Care \"6-Clicks\" Basic Mobility Scores Predict Discharge Destination After Acute Care Hospitalization in Select Patient Groups: A Retrospective, Observational Study. Arch Rehabil Res Clin Transl. 2022 Jul 16;4(3):905651. doi: 10.1016/j.arrct.2022.137227. PMID: 45224375; PMCID: [id number].  4. Martir VYAS, Violet S, Jose W, Jami P. AM-PAC Short Forms Manual 4.0. Revised 2/2020.                                                                                                                                                                                                                                Pain Rating:  None reported    Activity Tolerance:   Good    After treatment:   Patient left in no apparent distress sitting up in chair, Call bell within reach, and Bed/ chair alarm activated    COMMUNICATION/EDUCATION:   The patient's plan of care was discussed with: occupational therapist and registered nurse    Patient Education  Education Given To: Patient  Education Provided: Role of Therapy;Plan of Care  Education Method: Verbal  Barriers to Learning: None  Education Outcome: Verbalized understanding    Thank you for this referral.  Madhuri Stephens, PT  Minutes: 38

## 2024-08-21 NOTE — CONSULTS
[facility]: [facility]    Symone Sandoval, MSHA, CNRN, ACNP-BC  [facility] Neurology  [address]  [phone]        Name:   Lara Antonio   Medical record #: [id number]  Admission Date: 8/20/2024       Consult requested by: Vitaliy Pino MD     Reason for Consult:  Ataxia      HISTORY OF PRESENT ILLNESS:     This is a 83 y.o. female who is admitted for dizziness and unsteady gait.    Lara Antonio presented to the ED on 8/20/2024 with 4-day history of a dizziness (described as lightheaded and spinning) and balance disturbance.  She tells me that her symptoms began on Friday (8/16/2024) and the dizziness was so bad that she felt like she could not get out of bed.  She denies dizziness on Saturday or Sunday but says that she stayed in bed both days just to be sure, on Monday she called her PCP because she still had vertigo but also felt like she was \"shaking inside\" and her PCP directed her to come to the ED.  Upon arrival to the ED her exam is documented as nonfocal with a presenting blood pressure of 140/70.  Review of admission labs shows a presenting sodium of 143, glucose 124, creatinine 1.13 no evidence of transaminitis, normal CBC and urine.    The Neurology Service is asked to evaluate for stroke.    Patient was last seen by the [facility] Neurology team on 7/26/2023 by Dr. Sanchez for dizziness and headache.  At that time her exam was nonfocal, Dr. Marie started Topamax for migraine prevention, and recommended a workup for temporal arteritis.  This Paco did not follow-up.  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      Neuro-imaging:     CT Head: No acute process    CTA Head and Neck: No evidence of LVO or carotid stenosis    EKG: normal sinus rhythm.      Plan of care discussed with:  Patient, Primary team, and Family at bedside      Impression/ Plan:      1.  82-year-old female  intact in all extremities            Cerebellar:  No resting, no postural tremors, normal finger nose finger.  No pronator drift                            Motor:           LUExt: 4+/ 5               RUExt: 5/ 5                                              LLExt: 4+/ 5                RLExt: 5/ 5        Gait:   Not tested       Portions of this note were completed with Dragon, the computer voice recognition software.  Quite often unanticipated grammatical, syntax, homophones, and other interpretive errors are inadvertently transcribed by the computer software.  Please disregard these errors.  Efforts were made to edit the dictations but occasionally words are mis-transcribed.

## 2024-08-21 NOTE — PLAN OF CARE
Problem: Occupational Therapy - Adult  Goal: By Discharge: Performs self-care activities at highest level of function for planned discharge setting.  See evaluation for individualized goals.  Description: FUNCTIONAL STATUS PRIOR TO ADMISSION:  patient reports independent with ambulation and performs ADL tasks with mod I for distal LE dressing secondary to chronic back pain.   ,  ,  ,  ,  ,  ,  ,  ,  ,  ,       HOME SUPPORT: Patient lived alone with son on same property in cottage behind her home.    Occupational Therapy Goals:  Initiated 8/21/2024  1.  Patient will perform lower body dressing with Modified [place] within 7 day(s).  2.  Patient will perform grooming with Modified [place] within 7 day(s).  3.  Patient will perform toilet transfers with Modified [place]  within 7 day(s).  4.  Patient will perform all aspects of toileting with Modified [place] within 7 day(s).  5.  Patient will participate in upper extremity therapeutic exercise/activities with Modified [place] for 10 minutes within 7 day(s).    6.  Patient will utilize energy conservation techniques during functional activities with verbal cues within 7 day(s).    Outcome: Progressing   OCCUPATIONAL THERAPY TREATMENT  Patient: Lara Antonio (83 y.o. female)  Date: 8/21/2024  Primary Diagnosis: TIA (transient ischemic attack) [G45.9]  Dizziness [R42]  Stroke-like symptoms [R29.90]  Temporary weakness of extremity [R29.898]       Precautions:                  Chart, occupational therapy assessment, plan of care, and goals were reviewed.    ASSESSMENT  Patient continues to benefit from skilled OT services and is progressing towards goals. Patient received seated in chair, agreeable to activity.  She reports baseline independent with ambulation and mod I for ADLs.  She reports dizziness and weakness present over past few days, leading her to addition of quad cane for balance. Patient reports feeling \"off\" not dizzy but

## 2024-08-21 NOTE — ED NOTES
TRANSFER - OUT REPORT:    Verbal report given to LUIS EDUARDO Manzo on Lara Antonio  being transferred to MS/RT Room 414 for routine progression of patient care       Report consisted of patient's Situation, Background, Assessment and   Recommendations(SBAR).     Information from the following report(s) Nurse Handoff Report, Index, ED Encounter Summary, ED SBAR, Adult Overview, Intake/Output, MAR, Recent Results, Cardiac Rhythm NSR, and Neuro Assessment was reviewed with the receiving nurse.    [place] Assessment:    Presents to emergency department  because of falls (Syncope, seizure, or loss of consciousness): No  Age > 70: Yes  Altered Mental Status, Intoxication with alcohol or substance confusion (Disorientation, impaired judgment, poor safety awaremess, or inability to follow instructions): No  Impaired Mobility: Ambulates or transfers with assistive devices or assistance; Unable to ambulate or transer.: Yes  Nursing Judgement: Yes    Lines:   Peripheral IV 08/20/24 Left Antecubital (Active)        Opportunity for questions and clarification was provided.      Patient transported with:  [facility] (eta [id number])  Monitor

## 2024-08-21 NOTE — PROGRESS NOTES
[facility]                                                                                          Hospitalist Progress Note  Vitaliy Pino MD  Office Phone: [phone]        Date of Service:  2024  NAME:  Lara Antonio  :  1940  MRN:  [id number]       Admission Summary:   Per admission H&P, \"Ms. Antonio is a 83 y.o. female who is admitted with dizziness.  Ms. Antonio with past medical history of HTN, DM, vertigo, hypothyroidism, gout, hyperlipidemia, glaucoma, anxiety presented to ER complaining of dizziness and unsteady gait.  Symptoms started about 3 to 4 days ago with a feeling of dizziness and the room is moving and later became unsteady in her gait.  Patient had difficulty ambulating has to hold to something while walking.  Denies weakness, numbness or tingling.  No vision change\"       Interval history / Subjective:   Patient feels well on my evaluation.  Has had times of dizziness with movement     Assessment & Plan:     Dizziness/unsteady gait.  Tele.  Neuro watch.  CT and CTA of the head and neck are unremarkable. echocardiogram, A1c, lipid panel.  Continue aspirin and atorvastatin.  Consult neurology, PT/OT.  MRI normal     2.  Hypercholesteremia.  Continue atorvastatin and check lipid panel      3.  Type 2 diabetes mellitus with chronic kidney disease (HCC).  Check A1c.  Continue sliding scale and diabetic diet.  Patient takes Januvia at home     4.  Hypothyroid.  Continue levothyroxine.      5.  HTN (hypertension).  On atenolol      6.  GERD (gastroesophageal reflux disease).  Not on medication.  PPI if needed      7.  CKD (chronic kidney disease) stage 3, GFR 30-59 ml/min ([facility]).  Monitor renal function.  Avoid nephrotoxic drugs.     Code status: Need to clarify with patient.   DVT Prophylaxis: enoxaparin  Anticipated Disposition:   Inpatient          Social Determinants of Health     Tobacco Use: Low Risk  (2024)    Patient History      Smoking Tobacco Use: Never     Smokeless Tobacco Use: Never     Passive Exposure: Not on file   Alcohol Use: Not At Risk (8/20/2024)    AUDIT-C     Frequency of Alcohol Consumption: Never     Average Number of Drinks: Patient does not drink     Frequency of Binge Drinking: Never   Financial Resource Strain: Low Risk  (5/31/2024)    Overall Financial Resource Strain (CARDIA)     Difficulty of Paying Living Expenses: Not hard at all   Food Insecurity: No Food Insecurity (5/31/2024)    Hunger Vital Sign     Worried About Running Out of Food in the Last Year: Never true     Ran Out of Food in the Last Year: Never true   Transportation Needs: Unknown (5/31/2024)    PRAPARE - Transportation     Lack of Transportation (Medical): Not on file     Lack of Transportation (Non-Medical): No   Physical Activity: Insufficiently Active (11/29/2023)    Exercise Vital Sign     Days of Exercise per Week: 3 days     Minutes of Exercise per Session: 20 min   Stress: Not on file   Social Connections: Not on file   Intimate Partner Violence: Not on file   Depression: Not at risk (6/24/2024)    PHQ-2     PHQ-2 Score: 0   Housing Stability: Unknown (5/31/2024)    Housing Stability Vital Sign     Unable to Pay for Housing in the Last Year: Not on file     Number of Places Lived in the Last Year: Not on file     Unstable Housing in the Last Year: No   Interpersonal Safety: Not on file   Utilities: Not on file       Review of Systems:   Pertinent symptoms noted in the HPI      Vital Signs:    Last 24hrs VS reviewed since prior progress note. Most recent are:  Vitals:    08/21/24 0738   BP: (!) 126/58   Pulse: 67   Resp: 20   Temp: 97.9 °F (36.6 °C)   SpO2: 93%       No intake or output data in the 24 hours ending 08/21/24 0824     Physical Examination:     I had a face to face encounter with this patient and independently examined them on 8/21/2024 as outlined below:          General : alert and awake, no acute distress  HEENT: moist mucus  Duke Amin      XR CHEST PORTABLE   Final Result      No acute process on portable chest.         Electronically signed by Duke Amin      MRI BRAIN WO CONTRAST    (Results Pending)       No results found for this or any previous visit.        Notes reviewed from all clinical/nonclinical/nursing services involved in patient's clinical care. Care coordination discussions were held with appropriate clinical/nonclinical/ nursing providers based on care coordination needs.     Patients current active Medications were reviewed, considered, added and adjusted based on the clinical condition today.      Home Medications were reconciled to the best of my ability given all available resources at the time of admission. Route is PO if not otherwise noted.    Admission Status:[id number]:::1}    ______________________________________________________________________  EXPECTED LENGTH OF STAY: 1  ACTUAL LENGTH OF STAY:          0                 Vitaliy Pino MD

## 2024-08-21 NOTE — TELEPHONE ENCOUNTER
Gina from [facility] is calling as they received a referral for the patient and would like to know if Dr. LUCIANO would like to follow for home health care----she can be reached @ [phone].

## 2024-08-22 ENCOUNTER — TELEPHONE (OUTPATIENT)
Facility: HOSPITAL | Age: 84
End: 2024-08-22

## 2024-08-22 VITALS
WEIGHT: 149 LBS | TEMPERATURE: 98.4 F | SYSTOLIC BLOOD PRESSURE: 117 MMHG | OXYGEN SATURATION: 96 % | RESPIRATION RATE: 16 BRPM | HEIGHT: 61 IN | DIASTOLIC BLOOD PRESSURE: 62 MMHG | BODY MASS INDEX: 28.13 KG/M2 | HEART RATE: 69 BPM

## 2024-08-22 LAB
ALBUMIN SERPL-MCNC: 2.9 G/DL (ref 3.5–5)
ALBUMIN/GLOB SERPL: 0.9 (ref 1.1–2.2)
ALP SERPL-CCNC: 56 U/L (ref 45–117)
ALT SERPL-CCNC: 17 U/L (ref 12–78)
ANION GAP SERPL CALC-SCNC: 2 MMOL/L (ref 5–15)
AST SERPL-CCNC: 17 U/L (ref 15–37)
BILIRUB SERPL-MCNC: 0.4 MG/DL (ref 0.2–1)
BUN SERPL-MCNC: 28 MG/DL (ref 6–20)
BUN/CREAT SERPL: 24 (ref 12–20)
CALCIUM SERPL-MCNC: 8.7 MG/DL (ref 8.5–10.1)
CHLORIDE SERPL-SCNC: 108 MMOL/L (ref 97–108)
CO2 SERPL-SCNC: 28 MMOL/L (ref 21–32)
COMMENT:: NORMAL
CREAT SERPL-MCNC: 1.19 MG/DL (ref 0.55–1.02)
GLOBULIN SER CALC-MCNC: 3.1 G/DL (ref 2–4)
GLUCOSE BLD STRIP.AUTO-MCNC: 124 MG/DL (ref 65–117)
GLUCOSE BLD STRIP.AUTO-MCNC: 135 MG/DL (ref 65–117)
GLUCOSE SERPL-MCNC: 134 MG/DL (ref 65–100)
MAGNESIUM SERPL-MCNC: 1.8 MG/DL (ref 1.6–2.4)
POTASSIUM SERPL-SCNC: 4.3 MMOL/L (ref 3.5–5.1)
PROT SERPL-MCNC: 6 G/DL (ref 6.4–8.2)
SERVICE CMNT-IMP: ABNORMAL
SERVICE CMNT-IMP: ABNORMAL
SODIUM SERPL-SCNC: 138 MMOL/L (ref 136–145)
SPECIMEN HOLD: NORMAL

## 2024-08-22 PROCEDURE — 6360000002 HC RX W HCPCS: Performed by: STUDENT IN AN ORGANIZED HEALTH CARE EDUCATION/TRAINING PROGRAM

## 2024-08-22 PROCEDURE — 99232 SBSQ HOSP IP/OBS MODERATE 35: CPT | Performed by: NURSE PRACTITIONER

## 2024-08-22 PROCEDURE — 6370000000 HC RX 637 (ALT 250 FOR IP): Performed by: STUDENT IN AN ORGANIZED HEALTH CARE EDUCATION/TRAINING PROGRAM

## 2024-08-22 PROCEDURE — 6370000000 HC RX 637 (ALT 250 FOR IP): Performed by: INTERNAL MEDICINE

## 2024-08-22 PROCEDURE — 96372 THER/PROPH/DIAG INJ SC/IM: CPT

## 2024-08-22 PROCEDURE — G0378 HOSPITAL OBSERVATION PER HR: HCPCS

## 2024-08-22 PROCEDURE — 2580000003 HC RX 258: Performed by: INTERNAL MEDICINE

## 2024-08-22 PROCEDURE — 82962 GLUCOSE BLOOD TEST: CPT

## 2024-08-22 PROCEDURE — 97530 THERAPEUTIC ACTIVITIES: CPT

## 2024-08-22 PROCEDURE — 83735 ASSAY OF MAGNESIUM: CPT

## 2024-08-22 PROCEDURE — 6370000000 HC RX 637 (ALT 250 FOR IP)

## 2024-08-22 PROCEDURE — 97116 GAIT TRAINING THERAPY: CPT

## 2024-08-22 PROCEDURE — 97535 SELF CARE MNGMENT TRAINING: CPT

## 2024-08-22 PROCEDURE — 94761 N-INVAS EAR/PLS OXIMETRY MLT: CPT

## 2024-08-22 PROCEDURE — 80053 COMPREHEN METABOLIC PANEL: CPT

## 2024-08-22 RX ORDER — DEXTROSE MONOHYDRATE 100 MG/ML
INJECTION, SOLUTION INTRAVENOUS CONTINUOUS PRN
Status: DISCONTINUED | OUTPATIENT
Start: 2024-08-22 | End: 2024-08-22 | Stop reason: HOSPADM

## 2024-08-22 RX ORDER — LANOLIN ALCOHOL/MO/W.PET/CERES
400 CREAM (GRAM) TOPICAL NIGHTLY
Status: DISCONTINUED | OUTPATIENT
Start: 2024-08-22 | End: 2024-08-22 | Stop reason: HOSPADM

## 2024-08-22 RX ADMIN — SODIUM CHLORIDE, PRESERVATIVE FREE 10 ML: 5 INJECTION INTRAVENOUS at 09:52

## 2024-08-22 RX ADMIN — ATENOLOL 25 MG: 25 TABLET ORAL at 09:52

## 2024-08-22 RX ADMIN — ENOXAPARIN SODIUM 40 MG: 100 INJECTION SUBCUTANEOUS at 09:52

## 2024-08-22 RX ADMIN — ACETAMINOPHEN 650 MG: 325 TABLET ORAL at 04:28

## 2024-08-22 RX ADMIN — ALLOPURINOL 100 MG: 100 TABLET ORAL at 09:50

## 2024-08-22 RX ADMIN — ASPIRIN 81 MG: 81 TABLET, CHEWABLE ORAL at 09:49

## 2024-08-22 RX ADMIN — LEVOTHYROXINE SODIUM 50 MCG: 0.05 TABLET ORAL at 06:08

## 2024-08-22 RX ADMIN — FLUTICASONE PROPIONATE 1 SPRAY: 50 SPRAY, METERED NASAL at 10:14

## 2024-08-22 ASSESSMENT — PAIN SCALES - GENERAL
PAINLEVEL_OUTOF10: 3
PAINLEVEL_OUTOF10: 0

## 2024-08-22 ASSESSMENT — PAIN DESCRIPTION - LOCATION: LOCATION: HEAD

## 2024-08-22 ASSESSMENT — PAIN DESCRIPTION - DESCRIPTORS: DESCRIPTORS: ACHING

## 2024-08-22 NOTE — TELEPHONE ENCOUNTER
Pt needs a hospital follow up appointment  Provider: Dr. Sanchez  Location: In person, [place]  When: Next available  Diagnosis/reason for follow up: Patient needs hospital follow-up and to reestablish care with Dr. Marie for cluster headaches

## 2024-08-22 NOTE — DISCHARGE INSTRUCTIONS
Discharge Summary         PATIENT ID: Lara Antonio  MRN: [id number]   YOB: 1940    DATE OF ADMISSION: 8/20/2024  3:01 PM    DATE OF DISCHARGE: 8/22/2024 1:43 PM  PRIMARY CARE PROVIDER: Pau Tavarez MD      ATTENDING PHYSICIAN: Tamara Mcclellnad MD  DISCHARGING PROVIDER: Tamara Mcclelland MD    To contact this individual call [phone] and ask the  to page.  If unavailable ask to be transferred the Adult Hospitalist Department.     CONSULTATIONS: IP CONSULT TO NEUROLOGY  IP CONSULT TO CASE MANAGEMENT  IP CONSULT HOME HEALTH     PROCEDURES/SURGERIES: * No surgery found *     ADMITTING DIAGNOSES & HOSPITAL COURSE:   Per admission H&P, \"Ms. Antonio is a 83 y.o. female who is admitted with dizziness.  Ms. Antonio with past medical history of HTN, DM, vertigo, hypothyroidism, gout, hyperlipidemia, glaucoma, anxiety presented to ER complaining of dizziness and unsteady gait.  Symptoms started about 3 to 4 days ago with a feeling of dizziness and the room is moving and later became unsteady in her gait.  Patient had difficulty ambulating has to hold to something while walking.  Denies weakness, numbness or tingling.  No vision change\"     Workup included CT brain, CT angiography, MRI, echocardiogram, laboratory evaluation, PT/OT/neurology evaluation and orthostatic blood pressures.  Workup was negative, indicating likely benign vertigo.  She should follow-up with Dr. Marie as an outpatient.     DISCHARGE DIAGNOSES / PLAN:       Vertigo/dizziness/unsteady gait  - Continuous monitor on telemetry unremarkable  - Neurochecks intact  - CT and CT angiogram unremarkable  - MRI with no acute findings  - Echocardiogram with intact EF, no major valvular abnormalities  - A1c and lipid panel reviewed  - Continue aspirin  - Follow-up with neurology as an outpatient     Hyperlipidemia  - Continue statin and co-Q10     Type 2 diabetes  - Reviewed A1c, at goal  - Continue home meds     Hypertension  -  Continue atenolol     CKD 3A  - No acute issues     GERD  - No acute issues     [facility] POLST COMPLETED: Offered, declined.  Paperwork provided to patient to review.  She should go over this with her primary care physician         PENDING TEST RESULTS:   At the time of discharge the following test results are still pending: None     FOLLOW UP APPOINTMENTS:    [facility]  [address]  [place] [address] [phone]  Call  Call agency if you have not heard from them within 1 day of arriving home to schedule first visit/assessment     Adan Sanchez MD  [address]  [address]  [place] [address] [phone]     Follow up in 2 week(s)        Pau Tavarez MD  [address]  [address]  [place] [address] [phone]     Follow up in 3 day(s)           ADDITIONAL CARE RECOMMENDATIONS: You should follow-up with your neurologist as an outpatient.  You should follow-up with your primary care within 3-5 days.  You have been provided exercises to do to 4 episodes of vertigo.  Return to the ER if you have shortness of breath, chest pain, palpitations, or any neurologic deficits including slurred speech, facial droop, or weakness.        DIET: regular diet ADULT DIET; Regular; 4 carb choices (60 gm/meal)     ACTIVITY: activity as tolerated  DISCHARGE MEDICATIONS:      Medication List          CONTINUE taking these medications       allopurinol 100 MG tablet  Commonly known as: ZYLOPRIM  Take 1 tablet by mouth daily      aspirin 81 MG EC tablet      atenolol 25 MG tablet  Commonly known as: TENORMIN  take 1 tablet by mouth once daily      coenzyme Q10 100 MG Caps capsule      fish oil 1000 MG capsule      GAS-X EXTRA STRENGTH PO      HM LIDOCAINE PATCH EX      Lancets Misc      latanoprost 0.005 % ophthalmic solution  Commonly known as: XALATAN      levothyroxine 50 MCG tablet  Commonly known as: SYNTHROID  Take 1 tablet by mouth every morning (before

## 2024-08-22 NOTE — CARE COORDINATION
10:58 AM  08/22/24 08/22/24 1058   Condition of Participation: Discharge Planning   The Plan for Transition of Care is related to the following treatment goals: TIA   The Patient and/or Patient Representative was provided with a Choice of Provider? Patient   The Patient and/Or Patient Representative agree with the Discharge Plan? Yes   Freedom of Choice list was provided with basic dialogue that supports the patient's individualized plan of care/goals, treatment preferences, and shares the quality data associated with the providers?  Yes         Care Management Progress Note    Reason for Admission:   TIA (transient ischemic attack) [G45.9]  Dizziness [R42]  Stroke-like symptoms [R29.90]  Temporary weakness of extremity [R29.898]         Patient Admission Status: Observation  RUR: No data recorded  Hospitalization in the last 30 days (Readmission):  No        Transition of care plan:  Pt discussed during IDR; neurology following.   Anticipated discharge is home with home health- CM met face to face with pt; she verbalized agreement/preference of [facility]. [facility] and will contact pt for SOC.  Outpatient follow-up.  Pt's family to transport- pt's son will drive pt home via private car.      CM consult for rollator noted. Freedom of Choice offered, and pt preference indicated as [facility]. Referral submitted via [employer], and they accepted.   DME delivered by CM: [] Yes, invoice attached in [employer] [x] DME to be delivered by provider         FELIX Castro

## 2024-08-22 NOTE — TELEPHONE ENCOUNTER
I can follow her for HH but she will need a CAMELIA visit. She is still admitted currently but we can schedule her CAMELIA once they know when she is being discharged.     Thanks!

## 2024-08-22 NOTE — PLAN OF CARE
Problem: Physical Therapy - Adult  Goal: By Discharge: Performs mobility at highest level of function for planned discharge setting.  See evaluation for individualized goals.  Description: FUNCTIONAL STATUS PRIOR TO ADMISSION: Patient was independent and active without use of DME.  She drives and walks without an assistive device - she has been using a quad cane since Friday.  She has a history of three falls, most recently a few months ago.      HOME SUPPORT PRIOR TO ADMISSION: The patient lived alone with her son in the cottage behind her house.  She lives in a tri-level with her bedroom on the fourth floor.    Physical Therapy Goals  Initiated 8/21/2024  1.  Patient will move from supine to sit and sit to supine in bed with independence within 7 day(s).    2.  Patient will perform sit to stand with independence within 7 day(s).  3.  Patient will transfer from bed to chair and chair to bed with independence using the least restrictive device within 7 day(s).  4.  Patient will ambulate with independence for 200 feet with the least restrictive device within 7 day(s).   5.  Patient will ascend/descend 12 stairs with handrail(s) with modified independence within 7 day(s).   Outcome: Adequate for Discharge   PHYSICAL THERAPY TREATMENT    Patient: Lara Antonio (83 y.o. female)  Date: 8/22/2024  Diagnosis: TIA (transient ischemic attack) [G45.9]  Dizziness [R42]  Stroke-like symptoms [R29.90]  Temporary weakness of extremity [R29.898] TIA (transient ischemic attack)      Precautions:                        ASSESSMENT:  Patient continues to benefit from skilled PT services and is progressing towards goals. Patient up in chair reporting near full resolution of dizzy/\"feeling off\" symptoms, states feels ready to go home.  Patient did well with static standing without support and trialed gait with her quad cane, however had one moderate LOB with turning out of door needing the wall to steady herself.  Switched cane out  chair alarm activated      COMMUNICATION/EDUCATION:   The patient's plan of care was discussed with: occupational therapist and registered nurse           Jazmine Arenas, PT  Minutes: 25

## 2024-08-22 NOTE — PLAN OF CARE
Problem: Chronic Conditions and Co-morbidities  Goal: Patient's chronic conditions and co-morbidity symptoms are monitored and maintained or improved  8/22/2024 1327 by Emily Martínez RN  Outcome: Progressing  8/22/2024 1325 by Emily Martínez RN  Outcome: Progressing  Flowsheets (Taken 8/22/2024 0832)  Care Plan - Patient's Chronic Conditions and Co-Morbidity Symptoms are Monitored and Maintained or Improved: Monitor and assess patient's chronic conditions and comorbid symptoms for stability, deterioration, or improvement     Problem: Safety - Adult  Goal: Free from fall injury  8/22/2024 1327 by Emily Martínez RN  Outcome: Progressing  8/22/2024 1325 by Emily Martínez RN  Outcome: Progressing     Problem: Pain  Goal: Verbalizes/displays adequate comfort level or baseline comfort level  8/22/2024 1327 by Emily Martínez RN  Outcome: Progressing  8/22/2024 1325 by Emily Martínez RN  Outcome: Progressing  Flowsheets  Taken 8/22/2024 1144  Verbalizes/displays adequate comfort level or baseline comfort level:   Encourage patient to monitor pain and request assistance   Assess pain using appropriate pain scale  Taken 8/22/2024 0832  Verbalizes/displays adequate comfort level or baseline comfort level:   Encourage patient to monitor pain and request assistance   Assess pain using appropriate pain scale

## 2024-08-22 NOTE — PLAN OF CARE
Problem: Discharge Planning  Goal: Discharge to home or other facility with appropriate resources  Outcome: Progressing  Flowsheets (Taken 8/22/2024 0832)  Discharge to home or other facility with appropriate resources:   Identify barriers to discharge with patient and caregiver   Arrange for needed discharge resources and transportation as appropriate     Problem: Chronic Conditions and Co-morbidities  Goal: Patient's chronic conditions and co-morbidity symptoms are monitored and maintained or improved  Outcome: Progressing  Flowsheets (Taken 8/22/2024 0832)  Care Plan - Patient's Chronic Conditions and Co-Morbidity Symptoms are Monitored and Maintained or Improved: Monitor and assess patient's chronic conditions and comorbid symptoms for stability, deterioration, or improvement     Problem: Safety - Adult  Goal: Free from fall injury  Outcome: Progressing     Problem: Pain  Goal: Verbalizes/displays adequate comfort level or baseline comfort level  Outcome: Progressing  Flowsheets  Taken 8/22/2024 1144  Verbalizes/displays adequate comfort level or baseline comfort level:   Encourage patient to monitor pain and request assistance   Assess pain using appropriate pain scale  Taken 8/22/2024 0832  Verbalizes/displays adequate comfort level or baseline comfort level:   Encourage patient to monitor pain and request assistance   Assess pain using appropriate pain scale

## 2024-08-22 NOTE — PLAN OF CARE
Problem: Occupational Therapy - Adult  Goal: By Discharge: Performs self-care activities at highest level of function for planned discharge setting.  See evaluation for individualized goals.  Description: FUNCTIONAL STATUS PRIOR TO ADMISSION:  patient reports independent with ambulation and performs ADL tasks with mod I for distal LE dressing secondary to chronic back pain.   ,  ,  ,  ,  ,  ,  ,  ,  ,  ,       HOME SUPPORT: Patient lived alone with son on same property in cottage behind her home.    Occupational Therapy Goals:  Initiated 8/21/2024  1.  Patient will perform lower body dressing with Modified [place] within 7 day(s).  2.  Patient will perform grooming with Modified [place] within 7 day(s).  3.  Patient will perform toilet transfers with Modified [place]  within 7 day(s).  4.  Patient will perform all aspects of toileting with Modified [place] within 7 day(s).  5.  Patient will participate in upper extremity therapeutic exercise/activities with Modified [place] for 10 minutes within 7 day(s).    6.  Patient will utilize energy conservation techniques during functional activities with verbal cues within 7 day(s).    Outcome: Progressing   OCCUPATIONAL THERAPY TREATMENT  Patient: Lara Antonio (83 y.o. female)  Date: 8/22/2024  Primary Diagnosis: TIA (transient ischemic attack) [G45.9]  Dizziness [R42]  Stroke-like symptoms [R29.90]  Temporary weakness of extremity [R29.898]       Precautions:                  Chart, occupational therapy assessment, plan of care, and goals were reviewed.    ASSESSMENT  Patient continues to benefit from skilled OT services and is progressing towards goals. Pt seated in chair, dressed herself with stand by assist except needed assist to don right shoe.  Educated as to sitting to don clothing for safety. Reviewed commode transfers with use of rollator, pt stand by assist as well as for grooming at sink. Pt looking forward to discharge to

## 2024-08-22 NOTE — DISCHARGE SUMMARY
Discharge Summary       PATIENT ID: Lara Antonio  MRN: [id number]   YOB: 1940    DATE OF ADMISSION: 8/20/2024  3:01 PM    DATE OF DISCHARGE: 8/22/2024 1:43 PM  PRIMARY CARE PROVIDER: Pau Tavarez MD     ATTENDING PHYSICIAN: Tamara Mcclelland MD  DISCHARGING PROVIDER: Tamara Mcclelland MD    To contact this individual call [phone] and ask the  to page.  If unavailable ask to be transferred the Adult Hospitalist Department.    CONSULTATIONS: IP CONSULT TO NEUROLOGY  IP CONSULT TO CASE MANAGEMENT  IP CONSULT HOME HEALTH    PROCEDURES/SURGERIES: * No surgery found *    ADMITTING DIAGNOSES & HOSPITAL COURSE:   Per admission H&P, \"Ms. Antonio is a 83 y.o. female who is admitted with dizziness.  Ms. Antonio with past medical history of HTN, DM, vertigo, hypothyroidism, gout, hyperlipidemia, glaucoma, anxiety presented to ER complaining of dizziness and unsteady gait.  Symptoms started about 3 to 4 days ago with a feeling of dizziness and the room is moving and later became unsteady in her gait.  Patient had difficulty ambulating has to hold to something while walking.  Denies weakness, numbness or tingling.  No vision change\"     Workup included CT brain, CT angiography, MRI, echocardiogram, laboratory evaluation, PT/OT/neurology evaluation and orthostatic blood pressures.  Workup was negative, indicating likely benign vertigo.  She should follow-up with Dr. Marie as an outpatient.    DISCHARGE DIAGNOSES / PLAN:      Vertigo/dizziness/unsteady gait  - Continuous monitor on telemetry unremarkable  - Neurochecks intact  - CT and CT angiogram unremarkable  - MRI with no acute findings  - Echocardiogram with intact EF, no major valvular abnormalities  - A1c and lipid panel reviewed  - Continue aspirin  - Follow-up with neurology as an outpatient    Hyperlipidemia  - Continue statin and co-Q10    Type 2 diabetes  - Reviewed A1c, at goal  - Continue home meds    Hypertension  - Continue  tablet for sleep at about 20 mins before going to bed     * MULTIVITAMIN ADULT EXTRA C PO     * PRESERVISION AREDS 2 PO     Pepcid Complete -165 MG Chew  Generic drug: Famotidine-Ca Carb-Mag Hydrox     simvastatin 10 MG tablet  Commonly known as: ZOCOR  take 1 tablet by mouth at bedtime (DISCONTINUE 20MG)     SITagliptin 50 MG tablet  Commonly known as: Januvia  Take 1 tablet by mouth daily     True Metrix Blood Glucose Test strip  Generic drug: blood glucose test strips     Veltassa 8.4 g Pack packet  Generic drug: patiromer sorbitex calcium           * This list has 2 medication(s) that are the same as other medications prescribed for you. Read the directions carefully, and ask your doctor or other care provider to review them with you.                    NOTIFY YOUR PHYSICIAN FOR ANY OF THE FOLLOWING:   Fever over 101 degrees for 24 hours.   Chest pain, shortness of breath, fever, chills, nausea, vomiting, diarrhea, change in mentation, falling, weakness, bleeding. Severe pain or pain not relieved by medications.  Or, any other signs or symptoms that you may have questions about.    DISPOSITION:       x Home With:   OT  PT x HH  RN       Long term SNF/Inpatient Rehab    Independent/assisted living    Hospice    Other:       PATIENT CONDITION AT DISCHARGE:     Functional status    Poor     Deconditioned    x Independent      Cognition    x Lucid     Forgetful     Dementia      Catheters/lines (plus indication)    [place]     PICC     PEG    x None      No Order No additional code details       PHYSICAL EXAMINATION AT DISCHARGE:    General : alert x 3, awake, no acute distress,   HEENT: PEERL, EOMI, moist mucus membrane  Neck: supple, no JVD, no meningeal signs  Chest: Clear to auscultation bilaterally   CVS: S1 S2 heard, Capillary refill less than 2 seconds  Abd: soft/ Non tender, non distended, BS physiological,   Ext: no clubbing, no cyanosis, no edema, brisk 2+ DP pulses  Neuro/Psych: pleasant mood and  affect, CN 2-12 grossly intact  Skin: warm     CHRONIC MEDICAL DIAGNOSES:      Greater than 31 minutes were spent with the patient on counseling and coordination of care    Signed:   Tamara Mcclelland MD  8/22/2024  1:43 PM

## 2024-08-22 NOTE — TELEPHONE ENCOUNTER
Gina from [facility] advised that we will follow patient but she will need appt. Gina stated she will reach out to son and let him know to schedule in next 1-2 weeks

## 2024-08-22 NOTE — PROGRESS NOTES
[facility]: [facility]    Symone Sandoval, MSHA, CNRN, ACNP-BC  [facility] Neurology  [address]  [phone]        Name:   Lara Antonio   Medical record #: [id number]  Admission Date: 8/20/2024   Reason for Consult:   Ataxia     Subjective/Objective:   Overnight events:    No acute events overnight          Plan of care discussed with:  Patient    Impression/ Plan:      1.  82-year-old female who presents with a 4-day history of dizziness and ataxia, differential diagnosis includes posterior stroke versus vertigo:    MRI is negative, patient denies ongoing symptoms today, likely this was vertigo  Continue home aspirin  Neurochecks:  Every 4 hours  BP Goal: Less than 140  Orthostatic vital signs reviewed, no evidence of drop in pressure from sitting to standing  Patient complains of intermittent headaches in the evening and requests to see Dr. Sanchez in clinic again, will send a note to clinic to schedule this for her.    Thank you for allowing the Neurology Service the pleasure of participating in the care of your patient.  Will arrange follow-up with patient with Dr. Marie.     Physical Exam    Patient Vitals for the past 12 hrs:   Temp Pulse Resp BP SpO2   08/22/24 0832 98.1 °F (36.7 °C) 69 16 (!) 113/50 92 %   08/22/24 0340 97.9 °F (36.6 °C) 70 18 124/61 96 %   08/21/24 2257 -- (!) 102 -- -- --   08/21/24 2222 97.8 °F (36.6 °C) 94 17 136/89 98 %          NEUROLOGICAL EXAM:       General:  Alert, cooperative, no acute distress  Mental Status: Oriented to time, place and person. Fully attentive. No aphasia. Full fund of knowledge. Normal recent and remote memory.      Cranial Nerves:   Visual Fields:  normal in all quadrants in both eyes. EOM: no nystagmus. Facial movements:  symmetric, no ptosis Facial sensation:  intact to LT on both sides. Hearing:  normal.       Language:  no dysarthria, no aphasia, normal fluency, normal repetition. Tongue: midline. Soft palate:  not examined  SCMs: normal, symmetric.              Sensory:   LT and Temp intact in all extremities, does not have point tenderness in the temple areas            Cerebellar:  No resting, no postural tremors, normal finger nose finger.  No pronator drift                            Motor:           LUExt: 5/ 5               RUExt: 5/ 5                                              LLExt: 5/ 5                RLExt: 5/ 5        Gait:   Not tested     Portions of this note were completed with Dragon, the "[employer] voice recognition software.  Quite often unanticipated grammatical, syntax, homophones, and other interpretive errors are inadvertently transcribed by the computer software.  Please disregard these errors.  Efforts were made to edit the dictations but occasionally words are mis-transcribed.

## 2024-08-26 ENCOUNTER — TELEPHONE (OUTPATIENT)
Facility: CLINIC | Age: 84
End: 2024-08-26

## 2024-08-26 NOTE — TELEPHONE ENCOUNTER
[facility] asked if MWE will sign any orders for patient. Patient has requested to push the start date for home health back to September 10th, as she has had a death in the family. Stated that a clinical member can call back and give verbal.

## 2024-08-27 NOTE — TELEPHONE ENCOUNTER
We will follow but she needs a CAMELIA appt. Can we please get her scheduled for the next available? Can be VV or same day if there is no other availability.    Thanks!

## 2024-08-28 ENCOUNTER — TELEPHONE (OUTPATIENT)
Facility: CLINIC | Age: 84
End: 2024-08-28

## 2024-08-28 NOTE — TELEPHONE ENCOUNTER
Called and spoke with patient. Advised hospital has ordered HH and we will need to see patient to continue to sign HH orders.  Patient is going to call back to set up Hospital follow up with MEW.

## 2024-08-28 NOTE — TELEPHONE ENCOUNTER
Called and spw patient and she is now scheduled for 10/18/24 at 1:00PM to see Dr. LUCIANO for [facility]-followup.

## 2024-08-29 ENCOUNTER — TELEPHONE (OUTPATIENT)
Facility: CLINIC | Age: 84
End: 2024-08-29

## 2024-08-29 NOTE — TELEPHONE ENCOUNTER
We have the pt's discharge records in the system so I will sign off on her orders before her CAMELIA. It's just still important that she have the CAMELIA so we can make sure we are following up on everything.     Thanks!

## 2024-08-29 NOTE — TELEPHONE ENCOUNTER
Sonya from [facility] called in and said that they still need a verbal or written order that  will follow the patient before the patients appt with her. The patient appt with  is on 10/18/2024 and [facility] will start seeing the patient on 09/10/2024 so her appt with  will be after they want to start seeing her.

## 2024-08-29 NOTE — TELEPHONE ENCOUNTER
Called and talked with Sonya from [facility]. Patient has eval on 09/10/2024. HH will to eval and treatment plan on 09/10/2024 and understands that Patient has to be seen by the Provider (CAMELIA) to sign off on  orders. PSR offered numerous appointment dates but did not work out with Patients schedule. Right now appointment is set up for 10/18/2024. HH understands if Patient does not follow up with PCP then Patient will be discharged from .

## 2024-09-03 ENCOUNTER — OFFICE VISIT (OUTPATIENT)
Age: 84
End: 2024-09-03
Payer: MEDICARE

## 2024-09-03 VITALS
BODY MASS INDEX: 28.13 KG/M2 | HEART RATE: 71 BPM | OXYGEN SATURATION: 97 % | SYSTOLIC BLOOD PRESSURE: 120 MMHG | WEIGHT: 149 LBS | RESPIRATION RATE: 18 BRPM | HEIGHT: 61 IN | DIASTOLIC BLOOD PRESSURE: 74 MMHG

## 2024-09-03 DIAGNOSIS — H90.3 SENSORINEURAL HEARING LOSS (SNHL) OF BOTH EARS: Primary | ICD-10-CM

## 2024-09-03 DIAGNOSIS — M27.0 TORUS PALATINUS: ICD-10-CM

## 2024-09-03 DIAGNOSIS — D49.0: ICD-10-CM

## 2024-09-03 DIAGNOSIS — R42 VERTIGO: ICD-10-CM

## 2024-09-03 PROCEDURE — 1123F ACP DISCUSS/DSCN MKR DOCD: CPT | Performed by: OTOLARYNGOLOGY

## 2024-09-03 PROCEDURE — G8399 PT W/DXA RESULTS DOCUMENT: HCPCS | Performed by: OTOLARYNGOLOGY

## 2024-09-03 PROCEDURE — 1090F PRES/ABSN URINE INCON ASSESS: CPT | Performed by: OTOLARYNGOLOGY

## 2024-09-03 PROCEDURE — G8427 DOCREV CUR MEDS BY ELIG CLIN: HCPCS | Performed by: OTOLARYNGOLOGY

## 2024-09-03 PROCEDURE — 3078F DIAST BP <80 MM HG: CPT | Performed by: OTOLARYNGOLOGY

## 2024-09-03 PROCEDURE — G8419 CALC BMI OUT NRM PARAM NOF/U: HCPCS | Performed by: OTOLARYNGOLOGY

## 2024-09-03 PROCEDURE — 99214 OFFICE O/P EST MOD 30 MIN: CPT | Performed by: OTOLARYNGOLOGY

## 2024-09-03 PROCEDURE — 3074F SYST BP LT 130 MM HG: CPT | Performed by: OTOLARYNGOLOGY

## 2024-09-03 PROCEDURE — 1036F TOBACCO NON-USER: CPT | Performed by: OTOLARYNGOLOGY

## 2024-09-03 ASSESSMENT — ENCOUNTER SYMPTOMS
APNEA: 0
SHORTNESS OF BREATH: 0
VOICE CHANGE: 0
SORE THROAT: 0
BACK PAIN: 0
CHOKING: 0
STRIDOR: 0
WHEEZING: 0
EYE DISCHARGE: 0
SINUS PRESSURE: 0
VOMITING: 0
ABDOMINAL PAIN: 0
TROUBLE SWALLOWING: 0
EYE ITCHING: 0
COUGH: 0
PHOTOPHOBIA: 0
SINUS PAIN: 0
NAUSEA: 0

## 2024-09-03 NOTE — PROGRESS NOTES
inflammation and granulation   tissue.   Negative for malignancy.     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 Aggressive lesions such as osteomyelitis versus invasive  neoplasm such as squamous cell carcinoma and not completely excluded. Clinical  correlation advised continued attention on follow-up advised.     Interval minimally enlarging bilateral level 2A lymph node measuring up to 0.8  cm, indeterminate. Continued attention on follow-up advised.  Assessment/Plan:       ICD-10-CM    1. 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    There is significant interval improvement in her lesion.  I no longer suspect any neoplasm.  Biopsy showing only inflammation.  CT did not reveal any pathologic LAD, I suspect her lesion was an infectious/inflammatory exacerbation of her torus.    Also reviewed her notes/imaging from recent hospitalization.  Vertigo improving.    Will postpone any plan for deeper biopsy/resection at this time.  Plan close fu again in 2 mos, pt agrees.    No orders of the defined types were placed in this encounter.     No follow-ups on file.       Thank you for referring this patient,    Chan Fernandes MD, FACS  Otolaryngology - Head & Neck Surgery  [facility]    [address] [place] [address] [address]  [place] [id number] [facility]  [place] [address]    O -   M        The patient (or guardian, if applicable) and other individuals in attendance with the patient were advised that Artificial Intelligence will be utilized during this visit to record and process the conversation to generate a clinical note. The patient (or guardian, if

## 2024-09-04 ENCOUNTER — TELEPHONE (OUTPATIENT)
Facility: CLINIC | Age: 84
End: 2024-09-04

## 2024-09-04 DIAGNOSIS — K59.00 CONSTIPATION, UNSPECIFIED CONSTIPATION TYPE: ICD-10-CM

## 2024-09-04 RX ORDER — POLYETHYLENE GLYCOL 3350 17 G/17G
17 POWDER, FOR SOLUTION ORAL DAILY
Qty: 578 G | Refills: 1 | Status: SHIPPED | OUTPATIENT
Start: 2024-09-04 | End: 2024-11-11

## 2024-09-04 NOTE — TELEPHONE ENCOUNTER
Refill sent. A little extra or too little of this would not hurt her so I recommend she just do her best with the measuring cap unless she has another way to measure out the 17 grams.     Thanks!

## 2024-09-04 NOTE — TELEPHONE ENCOUNTER
Patient called requesting refill on polyethylene glycol. Patient concerned though since the powered and cap are both white she is unsure if she is putting too much or not enough in.

## 2024-10-18 ENCOUNTER — TELEMEDICINE (OUTPATIENT)
Facility: CLINIC | Age: 84
End: 2024-10-18

## 2024-10-18 DIAGNOSIS — E11.22 TYPE 2 DIABETES MELLITUS WITH STAGE 3A CHRONIC KIDNEY DISEASE, WITHOUT LONG-TERM CURRENT USE OF INSULIN (HCC): ICD-10-CM

## 2024-10-18 DIAGNOSIS — R42 VERTIGO: ICD-10-CM

## 2024-10-18 DIAGNOSIS — N18.31 TYPE 2 DIABETES MELLITUS WITH STAGE 3A CHRONIC KIDNEY DISEASE, WITHOUT LONG-TERM CURRENT USE OF INSULIN (HCC): ICD-10-CM

## 2024-10-18 DIAGNOSIS — Z09 HOSPITAL DISCHARGE FOLLOW-UP: Primary | ICD-10-CM

## 2024-10-18 DIAGNOSIS — Z86.73 HISTORY OF STROKE: ICD-10-CM

## 2024-10-18 PROBLEM — G45.9 TIA (TRANSIENT ISCHEMIC ATTACK): Status: RESOLVED | Noted: 2024-08-20 | Resolved: 2024-10-18

## 2024-10-18 RX ORDER — MECLIZINE HCL 25MG 25 MG/1
TABLET, CHEWABLE ORAL
COMMUNITY
Start: 2024-10-04

## 2024-10-18 RX ORDER — LIDOCAINE 50 MG/G
PATCH TOPICAL
COMMUNITY
Start: 2024-10-03

## 2024-10-18 NOTE — PROGRESS NOTES
Chief Complaint   Patient presents with    Follow-Up from Hospital     \"Have you been to the ER, urgent care clinic since your last visit?  Hospitalized since your last visit?\"    YES - When: approximately 2 months ago.  Where and Why: [facility]. Yes, [facility]    “Have you seen or consulted any other health care providers outside of [facility] since your last visit?”    NO     LMP  (LMP Unknown)    No data recorded       No questionnaires available.                          LINK SENT TO PT.        Click Here for Release of Records Request     Identified Patient with 2 Patient Identifiers-Name and

## 2024-10-18 NOTE — PROGRESS NOTES
Lara Antonio is a 84 y.o. female who was seen by synchronous (real-time) audio-video technology.     Consent:  Patient and/or their healthcare decision maker is aware that this patient-initiated Telehealth encounter is a billable service, with coverage as determined by their insurance carrier. They are aware that they may receive a bill and have provided verbal consent to proceed: Yes    I was at home while conducting this encounter.    Platform: [employer]    CC: Transition of care    HPI: Pt is a 84 y.o. female who presents for transition of care.    Admission diagnosis: Vertigo  Facility: [facility]  Admission date: 8/20/24  Discharge date: 8/22/24  Date of contact with Nurse Navigator (please see note): N/A  Discharge records personally reviewed?: Yes    Summary of admission: She presented to the ER with dizziness. She had a full stroke work-up including CT brain, CTA head/neck, MRI brain, echo, labs and Neurology consult that were not revealing. It was felt that this was benign vertigo and she was discharged to follow-up with Neurology.    Since getting home she has not had many episodes of vertigo. These resolve when she does the Epley maneuver. She is not doing HH and does not feel like she needs it. She does not think she needs outpatient PT for the vertigo either at this point. She has an appt coming up with ENT to get her ears checked but has not made the appt with Neuro yet. She would like to do this because she also suffers with headaches.      Outstanding tests/results needing review?: None  Follow-up visits needed: Neuro  Changes in medications?: None      Past Medical History:   Diagnosis Date    Acute blood loss anemia 2/8/2021    Anxiety     Cancer (HCC)     Skin of face.    Cataracts, bilateral     Chronic back pain     x1 yr;seeing Chiropractor for a year    Diabetes (HCC)     Diverticulitis 6/20/2022    Dizziness of unknown cause 3/10/2016    Fall 6/20/2022    GERD (gastroesophageal reflux disease)

## 2024-11-01 RX ORDER — ATENOLOL 25 MG/1
TABLET ORAL
Qty: 90 TABLET | Refills: 1 | Status: SHIPPED | OUTPATIENT
Start: 2024-11-01

## 2024-11-06 ENCOUNTER — OFFICE VISIT (OUTPATIENT)
Age: 84
End: 2024-11-06
Payer: MEDICARE

## 2024-11-06 VITALS
BODY MASS INDEX: 27.75 KG/M2 | DIASTOLIC BLOOD PRESSURE: 76 MMHG | WEIGHT: 147 LBS | SYSTOLIC BLOOD PRESSURE: 102 MMHG | OXYGEN SATURATION: 99 % | HEART RATE: 72 BPM | HEIGHT: 61 IN

## 2024-11-06 DIAGNOSIS — M27.0 TORUS PALATINUS: Primary | ICD-10-CM

## 2024-11-06 DIAGNOSIS — H81.12 BENIGN PAROXYSMAL POSITIONAL VERTIGO OF LEFT EAR: ICD-10-CM

## 2024-11-06 PROCEDURE — 1036F TOBACCO NON-USER: CPT | Performed by: OTOLARYNGOLOGY

## 2024-11-06 PROCEDURE — G8419 CALC BMI OUT NRM PARAM NOF/U: HCPCS | Performed by: OTOLARYNGOLOGY

## 2024-11-06 PROCEDURE — 1159F MED LIST DOCD IN RCRD: CPT | Performed by: OTOLARYNGOLOGY

## 2024-11-06 PROCEDURE — G8427 DOCREV CUR MEDS BY ELIG CLIN: HCPCS | Performed by: OTOLARYNGOLOGY

## 2024-11-06 PROCEDURE — 1123F ACP DISCUSS/DSCN MKR DOCD: CPT | Performed by: OTOLARYNGOLOGY

## 2024-11-06 PROCEDURE — G8484 FLU IMMUNIZE NO ADMIN: HCPCS | Performed by: OTOLARYNGOLOGY

## 2024-11-06 PROCEDURE — 1090F PRES/ABSN URINE INCON ASSESS: CPT | Performed by: OTOLARYNGOLOGY

## 2024-11-06 PROCEDURE — 99213 OFFICE O/P EST LOW 20 MIN: CPT | Performed by: OTOLARYNGOLOGY

## 2024-11-06 RX ORDER — CLOBETASOL PROPIONATE 0.5 MG/G
OINTMENT TOPICAL
COMMUNITY
Start: 2024-10-30

## 2024-11-06 ASSESSMENT — ENCOUNTER SYMPTOMS
COUGH: 0
CHOKING: 0
APNEA: 0
WHEEZING: 0
VOMITING: 0
BACK PAIN: 0
EYE DISCHARGE: 0
TROUBLE SWALLOWING: 0
SHORTNESS OF BREATH: 0
SORE THROAT: 0
SINUS PAIN: 0
VOICE CHANGE: 0
SINUS PRESSURE: 0
EYE ITCHING: 0
PHOTOPHOBIA: 0
ABDOMINAL PAIN: 0
NAUSEA: 0
STRIDOR: 0

## 2024-11-06 NOTE — PROGRESS NOTES
mass.      Trachea: Trachea and phonation normal.   Cardiovascular:      Rate and Rhythm: Normal rate and regular rhythm.   Pulmonary:      Effort: Pulmonary effort is normal.      Breath sounds: No stridor.   Musculoskeletal:      Cervical back: Normal range of motion and neck supple.   Lymphadenopathy:      Cervical: No cervical adenopathy.   Neurological:      Mental Status: She is alert and oriented to person, place, and time.      Cranial Nerves: Cranial nerves 2-12 are intact.      Gait: Gait is intact.      Comments: Negative Hallpike without subjective vertigo however few small beats of torsional nystagmus left side   Psychiatric:         Attention and Perception: Attention normal.         Mood and Affect: Mood and affect normal.         Speech: Speech normal.        Photo Sept 2024      Photo July 2024        FINAL PATHOLOGIC DIAGNOSIS          Oral cavity, hard palate, biopsy:   Squamous mucosa with acute and chronic inflammation and granulation   tissue.   Negative for malignancy.     CT Result (most recent):  CTA HEAD NECK W CONTRAST 08/20/2024    Narrative  INDICATION: Dizziness, left arm weakness x 4 days    EXAMINATION:  CT ANGIOGRAPHY HEAD AND NECK AND NONCONTRAST HEAD CT    COMPARISON: None    TECHNIQUE: Noncontrast axial head CT was performed. Following the uneventful  administration of iodinated contrast material, axial CT angiography of the head  and neck was performed. Delayed axial images through the head were also  obtained. Coronal and sagittal reconstructions were obtained. Manual  postprocessing of images was performed. 3-D  Sagittal maximal intensity  projection images were obtained.  3-D Coronal maximal intensity projections were  obtained.  CT dose reduction was achieved through use of a standardized protocol  tailored for this examination and automatic exposure control for dose  modulation.    FINDINGS:    CT HEAD:    Ventricles: Midline, no hydrocephalus.  Intracranial Hemorrhage:

## 2024-11-30 DIAGNOSIS — E03.9 HYPOTHYROIDISM, UNSPECIFIED TYPE: ICD-10-CM

## 2024-12-03 RX ORDER — SIMVASTATIN 10 MG
TABLET ORAL
Qty: 90 TABLET | Refills: 1 | Status: SHIPPED | OUTPATIENT
Start: 2024-12-03

## 2024-12-03 RX ORDER — LEVOTHYROXINE SODIUM 50 UG/1
50 TABLET ORAL
Qty: 90 TABLET | Refills: 2 | Status: SHIPPED | OUTPATIENT
Start: 2024-12-03

## 2024-12-13 ENCOUNTER — OFFICE VISIT (OUTPATIENT)
Facility: CLINIC | Age: 84
End: 2024-12-13
Payer: MEDICARE

## 2024-12-13 VITALS
BODY MASS INDEX: 27 KG/M2 | WEIGHT: 143 LBS | RESPIRATION RATE: 16 BRPM | OXYGEN SATURATION: 96 % | HEART RATE: 80 BPM | HEIGHT: 61 IN | SYSTOLIC BLOOD PRESSURE: 124 MMHG | TEMPERATURE: 97.2 F | DIASTOLIC BLOOD PRESSURE: 78 MMHG

## 2024-12-13 DIAGNOSIS — M54.32 SCIATICA OF LEFT SIDE: Primary | ICD-10-CM

## 2024-12-13 DIAGNOSIS — K43.9 VENTRAL HERNIA WITHOUT OBSTRUCTION OR GANGRENE: ICD-10-CM

## 2024-12-13 PROCEDURE — 1123F ACP DISCUSS/DSCN MKR DOCD: CPT

## 2024-12-13 PROCEDURE — 1090F PRES/ABSN URINE INCON ASSESS: CPT

## 2024-12-13 PROCEDURE — 99214 OFFICE O/P EST MOD 30 MIN: CPT

## 2024-12-13 PROCEDURE — G8427 DOCREV CUR MEDS BY ELIG CLIN: HCPCS

## 2024-12-13 PROCEDURE — 1125F AMNT PAIN NOTED PAIN PRSNT: CPT

## 2024-12-13 PROCEDURE — 1160F RVW MEDS BY RX/DR IN RCRD: CPT

## 2024-12-13 PROCEDURE — 1036F TOBACCO NON-USER: CPT

## 2024-12-13 PROCEDURE — G8484 FLU IMMUNIZE NO ADMIN: HCPCS

## 2024-12-13 PROCEDURE — G8419 CALC BMI OUT NRM PARAM NOF/U: HCPCS

## 2024-12-13 PROCEDURE — 1159F MED LIST DOCD IN RCRD: CPT

## 2024-12-13 RX ORDER — PREDNISONE 20 MG/1
TABLET ORAL
Qty: 20 TABLET | Refills: 0 | Status: SHIPPED | OUTPATIENT
Start: 2024-12-13

## 2024-12-13 RX ORDER — BACLOFEN 10 MG/1
10 TABLET ORAL 2 TIMES DAILY
Qty: 21 TABLET | Refills: 0 | Status: SHIPPED | OUTPATIENT
Start: 2024-12-13

## 2024-12-13 ASSESSMENT — ENCOUNTER SYMPTOMS
CONSTIPATION: 0
BLOOD IN STOOL: 0
NAUSEA: 0
ABDOMINAL PAIN: 0
VOMITING: 0
WHEEZING: 0
SHORTNESS OF BREATH: 0
CHEST TIGHTNESS: 0
DIARRHEA: 0

## 2024-12-13 NOTE — PROGRESS NOTES
Acute Office Visit    Subjective  Chief Complaint   Patient presents with    Back Pain     Sciatica nerve?Been there for a month goes away when she sits hurts when she stand ups and walks/. Does not think she hit something or did anything to trigger this usues lidocaine patch. Helps with patch but not with leg     HPI:  Lara Antonio is a 84 y.o. female.    1. Sciatica of left side  Patient reports pain in her left lower back and hip that shoots through her buttock down to her knee. Pain is relieved with rest, aggravated with movement. She reports tingling down the left leg especially when she is seated.  Patient has a lidocaine patch on her lower back that helps a little bit. Patient has been seen at [facility] on [address]. Patient's blood sugars have been running in the lower hundreds in the morning.   2. Ventral hernia without obstruction or gangrene  Patient reports several months ago she noticed a \"bulge\" near her belly button that was very painful, she pushed it back in and pain was relieved. Denies issues having bowel movements or blood in bowel movements. Patient has a history of umbilical hernia repair.    Past Medical History:   Diagnosis Date    Acute blood loss anemia 2/8/2021    Anxiety     Cancer (HCC)     Skin of face.    Cataracts, bilateral     Chronic back pain     x1 yr;seeing Chiropractor for a year    Diabetes (HCC)     Diverticulitis 6/20/2022    Dizziness of unknown cause 3/10/2016    Fall 6/20/2022    GERD (gastroesophageal reflux disease)     Gout     Hypercholesterolemia     Hypertension     Hypothyroidism     Migraine     Shingles     TIA (transient ischemic attack) 08/20/2024     Past Surgical History:   Procedure Laterality Date    CATARACT REMOVAL Bilateral 05/2017    CHOLECYSTECTOMY      Open cholecystectomy.    COLONOSCOPY  06/07/2012    COLONOSCOPY  05/30/2010    COLONOSCOPY N/A 2/9/2021    COLONOSCOPY performed by Giancarlo Nicholas MD at [facility] ENDOSCOPY    DILATION AND

## 2024-12-13 NOTE — PATIENT INSTRUCTIONS
Dr. Solo Danielle  Located in: [facility]  Address: [address] [place] [address] [address], [place] [address]  Hours: Open ? Closes 5?PM  Phone: [phone]

## 2024-12-13 NOTE — PROGRESS NOTES
Chief Complaint   Patient presents with    Back Pain     Sciatica nerve?Been there for a month goes away when she sits hurts when she stand ups and walks/. Does not think she hit something or did anything to trigger this usues lidocaine patch. Helps with patch but not with leg     No data recorded  \"Have you been to the ER, urgent care clinic since your last visit?  Hospitalized since your last visit?\"    NO    “Have you seen or consulted any other health care providers outside our system since your last visit?”    NO        Click Here for Release of Records Request     /78 (Site: Left Upper Arm, Position: Sitting, Cuff Size: Large Adult)   Pulse 80   Temp 97.2 °F (36.2 °C) (Temporal)   Resp 16   Ht 1.549 m (5' 1\")   Wt 64.9 kg (143 lb)   LMP  (LMP Unknown)   SpO2 96%   BMI 27.02 kg/m²       6/24/2024    11:49 AM   Amb Fall Risk Assessment and TUG Test   Do you feel unsteady or are you worried about falling?  no   2 or more falls in past year? yes   Fall with injury in past year? no

## 2024-12-13 NOTE — ASSESSMENT & PLAN NOTE
-Discussed with patient use of ice and/or heat to affected areas  -Discussed use of OTC medications for pain relief  -Discussed with patient gentle stretching of affected area

## 2024-12-14 DIAGNOSIS — M54.32 SCIATICA OF LEFT SIDE: ICD-10-CM

## 2024-12-16 ENCOUNTER — TELEPHONE (OUTPATIENT)
Age: 84
End: 2024-12-16

## 2024-12-16 RX ORDER — BACLOFEN 10 MG/1
10 TABLET ORAL 2 TIMES DAILY
Qty: 21 TABLET | Refills: 0 | OUTPATIENT
Start: 2024-12-16

## 2024-12-16 NOTE — TELEPHONE ENCOUNTER
Called patient in regards to referral. Patient did not answer, left voicemail instructing patient to call back.

## 2024-12-30 RX ORDER — SITAGLIPTIN 50 MG/1
50 TABLET, FILM COATED ORAL DAILY
Qty: 90 TABLET | Refills: 1 | Status: SHIPPED | OUTPATIENT
Start: 2024-12-30

## 2024-12-31 ENCOUNTER — OFFICE VISIT (OUTPATIENT)
Age: 84
End: 2024-12-31
Payer: MEDICARE

## 2024-12-31 VITALS
SYSTOLIC BLOOD PRESSURE: 116 MMHG | HEART RATE: 70 BPM | BODY MASS INDEX: 27.45 KG/M2 | HEIGHT: 61 IN | OXYGEN SATURATION: 95 % | WEIGHT: 145.4 LBS | DIASTOLIC BLOOD PRESSURE: 72 MMHG | TEMPERATURE: 97.6 F | RESPIRATION RATE: 16 BRPM

## 2024-12-31 DIAGNOSIS — K43.9 VENTRAL HERNIA WITHOUT OBSTRUCTION OR GANGRENE: Primary | ICD-10-CM

## 2024-12-31 PROCEDURE — 1126F AMNT PAIN NOTED NONE PRSNT: CPT | Performed by: SURGERY

## 2024-12-31 PROCEDURE — 99214 OFFICE O/P EST MOD 30 MIN: CPT | Performed by: SURGERY

## 2024-12-31 PROCEDURE — 1123F ACP DISCUSS/DSCN MKR DOCD: CPT | Performed by: SURGERY

## 2024-12-31 PROCEDURE — 1036F TOBACCO NON-USER: CPT | Performed by: SURGERY

## 2024-12-31 PROCEDURE — G8484 FLU IMMUNIZE NO ADMIN: HCPCS | Performed by: SURGERY

## 2024-12-31 PROCEDURE — 1159F MED LIST DOCD IN RCRD: CPT | Performed by: SURGERY

## 2024-12-31 PROCEDURE — G8419 CALC BMI OUT NRM PARAM NOF/U: HCPCS | Performed by: SURGERY

## 2024-12-31 PROCEDURE — G8427 DOCREV CUR MEDS BY ELIG CLIN: HCPCS | Performed by: SURGERY

## 2024-12-31 PROCEDURE — 1090F PRES/ABSN URINE INCON ASSESS: CPT | Performed by: SURGERY

## 2024-12-31 ASSESSMENT — PATIENT HEALTH QUESTIONNAIRE - PHQ9
SUM OF ALL RESPONSES TO PHQ9 QUESTIONS 1 & 2: 0
SUM OF ALL RESPONSES TO PHQ QUESTIONS 1-9: 0
SUM OF ALL RESPONSES TO PHQ QUESTIONS 1-9: 0
1. LITTLE INTEREST OR PLEASURE IN DOING THINGS: NOT AT ALL
2. FEELING DOWN, DEPRESSED OR HOPELESS: NOT AT ALL
SUM OF ALL RESPONSES TO PHQ QUESTIONS 1-9: 0
SUM OF ALL RESPONSES TO PHQ QUESTIONS 1-9: 0

## 2024-12-31 NOTE — PROGRESS NOTES
Identified pt with two pt identifiers (name and ). Reviewed chart in preparation for visit and have obtained necessary documentation.    Lara Antonio is a 84 y.o. female  Chief Complaint   Patient presents with    New Patient     Referred for ventral hernia     /72 (Site: Left Upper Arm, Position: Sitting, Cuff Size: Large Adult)   Pulse 70   Temp 97.6 °F (36.4 °C) (Oral)   Resp 16   Ht 1.549 m (5' 1\")   Wt 66 kg (145 lb 6.4 oz)   LMP  (LMP Unknown)   SpO2 95%   BMI 27.47 kg/m²     1. Have you been to the ER, urgent care clinic since your last visit?  Hospitalized since your last visit?no    2. Have you seen or consulted any other health care providers outside of the [facility] since your last visit?  Include any pap smears or colon screening. no   
herniorrhaphy with mesh.    OTHER SURGICAL HISTORY  02/2021    blood transfusion    TONSILLECTOMY      TONSILLECTOMY      UPPER GASTROINTESTINAL ENDOSCOPY  06/07/2012    Ulcers in Antrum         Social History     Socioeconomic History    Marital status:      Spouse name: None    Number of children: None    Years of education: None    Highest education level: None   Tobacco Use    Smoking status: Never    Smokeless tobacco: Never   Substance and Sexual Activity    Alcohol use: No    Drug use: No   Social History Narrative         ** Merged History Encounter **     Social Determinants of Health     Financial Resource Strain: Low Risk  (5/31/2024)    Overall Financial Resource Strain (CARDIA)     Difficulty of Paying Living Expenses: Not hard at all   Food Insecurity: No Food Insecurity (5/31/2024)    Hunger Vital Sign     Worried About Running Out of Food in the Last Year: Never true     Ran Out of Food in the Last Year: Never true   Transportation Needs: Unknown (5/31/2024)    PRAPARE - Transportation     Lack of Transportation (Non-Medical): No   Physical Activity: Insufficiently Active (11/29/2023)    Exercise Vital Sign     Days of Exercise per Week: 3 days     Minutes of Exercise per Session: 20 min   Housing Stability: Unknown (5/31/2024)    Housing Stability Vital Sign     Unstable Housing in the Last Year: No        Family History   Problem Relation Age of Onset    Other Mother         Poliosis    Alzheimer's Disease Mother     Diabetes Mother     Heart Attack Father         Objective:     Vitals:    12/31/24 1406   BP: 116/72   Pulse: 70   Resp: 16   Temp: 97.6 °F (36.4 °C)   SpO2: 95%     Body mass index is 27.47 kg/m².  Wt Readings from Last 3 Encounters:   12/31/24 66 kg (145 lb 6.4 oz)   12/13/24 64.9 kg (143 lb)   11/06/24 66.7 kg (147 lb)        General: No acute distress, conversant  Eyes: PERRLA, no scleral icterus  HENT: Normocephalic, PEERLA  Neck: Trachea midline without LAD  Cardiac:

## 2025-01-04 ENCOUNTER — HOSPITAL ENCOUNTER (OUTPATIENT)
Facility: HOSPITAL | Age: 85
End: 2025-01-04
Attending: SURGERY
Payer: MEDICARE

## 2025-01-04 DIAGNOSIS — K43.9 VENTRAL HERNIA WITHOUT OBSTRUCTION OR GANGRENE: ICD-10-CM

## 2025-01-04 PROCEDURE — 74176 CT ABD & PELVIS W/O CONTRAST: CPT

## 2025-01-15 DIAGNOSIS — E11.9 TYPE 2 DIABETES MELLITUS WITHOUT COMPLICATION, UNSPECIFIED WHETHER LONG TERM INSULIN USE (HCC): Primary | ICD-10-CM

## 2025-01-15 NOTE — TELEPHONE ENCOUNTER
Patient is requesting a refill on her glucose test strips: TRUE METRIX BLOOD GLUCOSE TEST & lancets to be sent to her pharmacy: [id number] - [place] - [address] [facility] [address] [place] [phone] - F [phone].    resolved   sec to sepsis

## 2025-01-16 RX ORDER — LANCETS 30 GAUGE
1 EACH MISCELLANEOUS DAILY
Qty: 100 EACH | Refills: 5 | Status: SHIPPED | OUTPATIENT
Start: 2025-01-16

## 2025-01-16 RX ORDER — CALCIUM CITRATE/VITAMIN D3 200MG-6.25
TABLET ORAL
Qty: 100 STRIP | OUTPATIENT
Start: 2025-01-16

## 2025-01-16 RX ORDER — CALCIUM CITRATE/VITAMIN D3 200MG-6.25
1 TABLET ORAL DAILY
Qty: 100 EACH | Refills: 5 | Status: SHIPPED | OUTPATIENT
Start: 2025-01-16

## 2025-01-16 RX ORDER — GLUCOSAM/CHON-MSM1/C/MANG/BOSW 500-416.6
TABLET ORAL
Qty: 100 EACH | OUTPATIENT
Start: 2025-01-16

## 2025-01-19 ENCOUNTER — HOSPITAL ENCOUNTER (EMERGENCY)
Facility: HOSPITAL | Age: 85
Discharge: HOME | End: 2025-01-19
Attending: EMERGENCY MEDICINE
Payer: MEDICARE

## 2025-01-19 VITALS
BODY MASS INDEX: 26.24 KG/M2 | WEIGHT: 139 LBS | DIASTOLIC BLOOD PRESSURE: 65 MMHG | HEART RATE: 92 BPM | OXYGEN SATURATION: 97 % | HEIGHT: 61 IN | TEMPERATURE: 98.1 F | RESPIRATION RATE: 20 BRPM | SYSTOLIC BLOOD PRESSURE: 106 MMHG

## 2025-01-19 DIAGNOSIS — E86.0 HYPOVOLEMIA DUE TO DEHYDRATION: ICD-10-CM

## 2025-01-19 DIAGNOSIS — E86.1 HYPOVOLEMIA DUE TO DEHYDRATION: ICD-10-CM

## 2025-01-19 DIAGNOSIS — K52.9 GASTROENTERITIS PRESUMED INFECTIOUS: Primary | ICD-10-CM

## 2025-01-19 LAB
ALBUMIN SERPL-MCNC: 3.7 G/DL (ref 3.5–5.2)
ALBUMIN/GLOB SERPL: 1.4 (ref 1.1–2.2)
ALP SERPL-CCNC: 56 U/L (ref 35–104)
ALT SERPL-CCNC: 19 U/L (ref 10–35)
ANION GAP SERPL CALC-SCNC: 15 MMOL/L (ref 2–12)
AST SERPL-CCNC: 31 U/L (ref 10–35)
BASOPHILS # BLD: 0.02 K/UL (ref 0–0.1)
BASOPHILS NFR BLD: 0.4 % (ref 0–1)
BILIRUB SERPL-MCNC: 0.2 MG/DL (ref 0.2–1)
BUN SERPL-MCNC: 44 MG/DL (ref 8–23)
BUN/CREAT SERPL: 27 (ref 12–20)
CALCIUM SERPL-MCNC: 8.3 MG/DL (ref 8.8–10.2)
CHLORIDE SERPL-SCNC: 106 MMOL/L (ref 98–107)
CO2 SERPL-SCNC: 18 MMOL/L (ref 22–29)
COMMENT:: NORMAL
CREAT SERPL-MCNC: 1.63 MG/DL (ref 0.5–0.9)
DIFFERENTIAL METHOD BLD: ABNORMAL
EOSINOPHIL # BLD: 0.11 K/UL (ref 0–0.4)
EOSINOPHIL NFR BLD: 2.3 % (ref 0–7)
ERYTHROCYTE [DISTWIDTH] IN BLOOD BY AUTOMATED COUNT: 13.2 % (ref 11.5–14.5)
GLOBULIN SER CALC-MCNC: 2.6 G/DL (ref 2–4)
GLUCOSE SERPL-MCNC: 105 MG/DL (ref 65–100)
HCT VFR BLD AUTO: 37.7 % (ref 35–47)
HGB BLD-MCNC: 12.9 G/DL (ref 11.5–16)
IMM GRANULOCYTES # BLD AUTO: 0.05 K/UL (ref 0–0.04)
IMM GRANULOCYTES NFR BLD AUTO: 1 % (ref 0–0.5)
LYMPHOCYTES # BLD: 1.72 K/UL (ref 0.8–3.5)
LYMPHOCYTES NFR BLD: 35.8 % (ref 12–49)
MCH RBC QN AUTO: 32.7 PG (ref 26–34)
MCHC RBC AUTO-ENTMCNC: 34.2 G/DL (ref 30–36.5)
MCV RBC AUTO: 95.4 FL (ref 80–99)
MONOCYTES # BLD: 0.8 K/UL (ref 0–1)
MONOCYTES NFR BLD: 16.6 % (ref 5–13)
NEUTS SEG # BLD: 2.11 K/UL (ref 1.8–8)
NEUTS SEG NFR BLD: 43.9 % (ref 32–75)
NRBC # BLD: 0 K/UL (ref 0–0.01)
NRBC BLD-RTO: 0 PER 100 WBC
PLATELET # BLD AUTO: 203 K/UL (ref 150–400)
PMV BLD AUTO: 10.4 FL (ref 8.9–12.9)
POTASSIUM SERPL-SCNC: 3.8 MMOL/L (ref 3.5–5.1)
PROT SERPL-MCNC: 6.3 G/DL (ref 6.4–8.3)
RBC # BLD AUTO: 3.95 M/UL (ref 3.8–5.2)
SODIUM SERPL-SCNC: 139 MMOL/L (ref 136–145)
SPECIMEN HOLD: NORMAL
WBC # BLD AUTO: 4.8 K/UL (ref 3.6–11)

## 2025-01-19 PROCEDURE — 2580000003 HC RX 258: Performed by: EMERGENCY MEDICINE

## 2025-01-19 PROCEDURE — 80053 COMPREHEN METABOLIC PANEL: CPT

## 2025-01-19 PROCEDURE — 85025 COMPLETE CBC W/AUTO DIFF WBC: CPT

## 2025-01-19 PROCEDURE — 36415 COLL VENOUS BLD VENIPUNCTURE: CPT

## 2025-01-19 PROCEDURE — 96360 HYDRATION IV INFUSION INIT: CPT

## 2025-01-19 PROCEDURE — 96361 HYDRATE IV INFUSION ADD-ON: CPT

## 2025-01-19 PROCEDURE — 99284 EMERGENCY DEPT VISIT MOD MDM: CPT

## 2025-01-19 RX ORDER — SODIUM CHLORIDE, SODIUM LACTATE, POTASSIUM CHLORIDE, AND CALCIUM CHLORIDE .6; .31; .03; .02 G/100ML; G/100ML; G/100ML; G/100ML
1000 INJECTION, SOLUTION INTRAVENOUS ONCE
Status: COMPLETED | OUTPATIENT
Start: 2025-01-19 | End: 2025-01-19

## 2025-01-19 RX ORDER — ONDANSETRON 4 MG/1
4 TABLET, ORALLY DISINTEGRATING ORAL EVERY 8 HOURS PRN
Qty: 20 TABLET | Refills: 0 | Status: SHIPPED | OUTPATIENT
Start: 2025-01-19 | End: 2025-01-26

## 2025-01-19 RX ADMIN — SODIUM CHLORIDE, POTASSIUM CHLORIDE, SODIUM LACTATE AND CALCIUM CHLORIDE 1000 ML: 600; 310; 30; 20 INJECTION, SOLUTION INTRAVENOUS at 07:37

## 2025-01-19 RX ADMIN — SODIUM CHLORIDE, POTASSIUM CHLORIDE, SODIUM LACTATE AND CALCIUM CHLORIDE 1000 ML: 600; 310; 30; 20 INJECTION, SOLUTION INTRAVENOUS at 08:55

## 2025-01-19 ASSESSMENT — PAIN - FUNCTIONAL ASSESSMENT: PAIN_FUNCTIONAL_ASSESSMENT: NONE - DENIES PAIN

## 2025-01-19 NOTE — ED PROVIDER NOTES
[facility]  EMERGENCY DEPARTMENT ENCOUNTER      Pt Name: Lara Atnonio  MRN: [id number]  Birthdate 1940  Date of evaluation: 1/19/2025  Provider: Tai Luna MD    CHIEF COMPLAINT       Chief Complaint   Patient presents with    Diarrhea         HISTORY OF PRESENT ILLNESS   (Location/Symptom, Timing/Onset, Context/Setting, Quality, Duration, Modifying Factors, Severity)  Note limiting factors.   HPI    83yo F presents with a 2-day history of nausea, vomiting, and diarrhea. Onset of symptoms began 9-10 hours prior to initial emesis. Patient reports severe vomiting episodes, followed by concurrent vomiting and diarrhea. Patient notes presence of salt in emesis and diarrhea but no blood. Symptoms are ongoing. Patient denies abdominal pain but reports occasional feelings of abdominal discomfort and emptiness. Patient attempted oral intake with applesauce, scrambled egg, toast, and chicken noodle soup broth with crackers, but experienced difficulty tolerating these foods.    The patient lives with at least one child (5-year-old mentioned). Patient is hard of hearing. Patient reports feeling an empty stomach and occasional lumbar discomfort.      Nursing Notes were reviewed.    REVIEW OF SYSTEMS    (2-9 systems for level 4, 10 or more for level 5)     Review of Systems    Except as noted above the remainder of the review of systems was reviewed and negative.       PAST MEDICAL HISTORY     Past Medical History:   Diagnosis Date    Acute blood loss anemia 2/8/2021    Anxiety     Cancer (HCC)     Skin of face.    Cataracts, bilateral     Chronic back pain     x1 yr;seeing Chiropractor for a year    Diabetes (HCC)     Diverticulitis 6/20/2022    Dizziness of unknown cause 3/10/2016    Fall 6/20/2022    GERD (gastroesophageal reflux disease)     Gout     Hypercholesterolemia     Hypertension     Hypothyroidism     Migraine     Shingles     TIA (transient ischemic attack) 08/20/2024  27 (*)     Est, Glom Filt Rate 31 (*)     Calcium 8.3 (*)     Total Protein 6.3 (*)     All other components within normal limits   CBC WITH AUTO DIFFERENTIAL - Abnormal; Notable for the following components:    Monocytes % 16.6 (*)     Immature Granulocytes % 1.0 (*)     Immature Granulocytes Absolute 0.05 (*)     All other components within normal limits   EXTRA TUBES HOLD       All other labs were within normal range or not returned as of this dictation.    EMERGENCY DEPARTMENT COURSE and DIFFERENTIAL DIAGNOSIS/MDM:   Vitals:    Vitals:    01/19/25 0915 01/19/25 0930 01/19/25 0945 01/19/25 1000   BP: (!) 116/57 (!) 112/54 (!) 121/54 (!) 116/94   Pulse:       Resp:       Temp:       TempSrc:       SpO2: 96% 97% 93% 93%   Weight:       Height:               Medical Decision Making  Pt appears mildly hypovolemic but well perfused with reassuring vital signs. Exam shows benign abdomen. The patient is overall nontoxic appearing and is suspected to have a transient course of illness.    Given History and Exam there does not appear to be an emergent cause of the symptoms such as small bowel obstruction, coronary syndrome, bowel ischemia, DKA, pancreatitis, appendicitis, other acute abdomen or other emergent problem.    Reassessment: After treatment, the patient is feeling much better, tolerating PO fluids, and shows no signs of dehydration.     Disposition: Discharge home with prompt primary care physician follow up. Will provide symptomatic management. Strict return precautions discussed.      Amount and/or Complexity of Data Reviewed  Labs: ordered. Decision-making details documented in ED Course.    Risk  Prescription drug management.        ED Course as of 01/19/25 1018   Sun Jan 19, 2025   0729 Creatinine(!): 1.63  Prior 1.1-1.2 [RS]   0729 CARBON DIOXIDE(!): 18 [RS]   0729 Anion Gap(!): 15 [RS]   0729 LR ordered [RS]   0729 WBC: 4.8 [RS]      ED Course User Index  [RS] Tai Luna MD

## 2025-01-19 NOTE — ED NOTES
X2 assist ambulation to restroom due to feeling shaky. x1 episode of diarrhea. Patient wheeled back by nurse to room. MD aware.     Patient provided new brief as current one was soiled.

## 2025-01-19 NOTE — ED NOTES
Discharge instruction reviewed by SHARI Michelle with the patient.  The patient verbalized understanding. Patient provided with AVS.      Patient is ambulatory and steady gait upon discharge. Patient is AAOX4, breathing even and unlabored, skin warm and dry, skin intact.    Patient mobility status  with no difficulty. Provider aware     Patient left ED via Discharge Method: ambulatory to Home.    Opportunity for questions and clarification provided.     Patient given 0 paper scripts.

## 2025-01-19 NOTE — ED TRIAGE NOTES
Pt into ED via EMS from home with cc diarrhea x3 days. Pt reports Thursday had diarrhea and N/V but now only diarrhea. Denies N/V or abdominal pain at this time. Denies meds prior to arrival. Reports blood thinner. Denies recent surgeries.

## 2025-01-22 ENCOUNTER — TELEPHONE (OUTPATIENT)
Age: 85
End: 2025-01-22

## 2025-01-22 NOTE — TELEPHONE ENCOUNTER
Placed outbound call to patient and LVM requesting call back to reschedule appt to phone visit on Friday 01/24/2025 at 10:15am. Provided office contact info in message.

## 2025-01-22 NOTE — TELEPHONE ENCOUNTER
Patient called in requesting to know if her appt with Dr. Duarte tomorrow can be done over the phone so she can review her CT scan results without having to come into the office due to being sick. Patient is requesting a call back with a response at [phone]

## 2025-01-24 ENCOUNTER — TELEMEDICINE (OUTPATIENT)
Age: 85
End: 2025-01-24

## 2025-01-24 DIAGNOSIS — K43.9 VENTRAL HERNIA WITHOUT OBSTRUCTION OR GANGRENE: Primary | ICD-10-CM

## 2025-01-24 ASSESSMENT — PATIENT HEALTH QUESTIONNAIRE - PHQ9
1. LITTLE INTEREST OR PLEASURE IN DOING THINGS: NOT AT ALL
SUM OF ALL RESPONSES TO PHQ QUESTIONS 1-9: 1
2. FEELING DOWN, DEPRESSED OR HOPELESS: SEVERAL DAYS
SUM OF ALL RESPONSES TO PHQ9 QUESTIONS 1 & 2: 1

## 2025-01-24 NOTE — PROGRESS NOTES
Surgery Progress Note    1/24/2025    had concerns including Hernia (ventral) and Results (Discuss CT).     Subjective:     Patient is a 84 y.o. female who is following up virtually for a umbilical hernia.  I had seen the patient in office and on exam I had felt a mass in the periumbilical area, but due to her body habitus could not feel a defect per se.  I had recommended she get a CT scan which she did on January 4, 2025.  The patient recently developed gastroenteritis and is at home sick but wanted to have the follow-up visit so she scheduled it virtually.  Other than this gastroenteritis that she is dealing with at the time she denies any major issues from an umbilical hernia standpoint.  I did review the CT images myself which show a very small periumbilical hernia with a small amount of fat herniating through.  Otherwise no other ventral hernia seen.      Past Medical History:   Diagnosis Date    Acute blood loss anemia 2/8/2021    Anxiety     Cancer (HCC)     Skin of face.    Cataracts, bilateral     Chronic back pain     x1 yr;seeing Chiropractor for a year    Diabetes (HCC)     Diverticulitis 6/20/2022    Dizziness of unknown cause 3/10/2016    Fall 6/20/2022    GERD (gastroesophageal reflux disease)     Gout     Hypercholesterolemia     Hypertension     Hypothyroidism     Migraine     Shingles     TIA (transient ischemic attack) 08/20/2024        Past Surgical History:   Procedure Laterality Date    CATARACT REMOVAL Bilateral 05/2017    CHOLECYSTECTOMY      Open cholecystectomy.    COLONOSCOPY  06/07/2012    COLONOSCOPY  05/30/2010    COLONOSCOPY N/A 2/9/2021    COLONOSCOPY performed by Giancarlo Nicholas MD at [facility] ENDOSCOPY    DILATION AND CURETTAGE OF UTERUS      DILATION AND CURETTAGE OF UTERUS      HERNIA REPAIR      Umbilical herniorrhaphy with mesh.    OTHER SURGICAL HISTORY  02/2021    blood transfusion    TONSILLECTOMY      TONSILLECTOMY      UPPER GASTROINTESTINAL ENDOSCOPY  06/07/2012    Ulcers in

## 2025-01-24 NOTE — PROGRESS NOTES
Identified pt with two pt identifiers (name and ). Reviewed chart in preparation for visit and have obtained necessary documentation.    Lara Antonio is a 84 y.o. female  Chief Complaint   Patient presents with    Hernia     ventral    Results     Discuss CT     LMP  (LMP Unknown)     1. Have you been to the ER, urgent care clinic since your last visit?  Hospitalized since your last visit?no    2. Have you seen or consulted any other health care providers outside of the [facility] since your last visit?  Include any pap smears or colon screening. no

## 2025-02-21 ENCOUNTER — TELEPHONE (OUTPATIENT)
Facility: CLINIC | Age: 85
End: 2025-02-21

## 2025-02-21 NOTE — TELEPHONE ENCOUNTER
Patient was unable to do vv today with [facility]. Per Krp, I checked with EW to see if she would be able to write letter for Ms. Antonio. EW agreed to writing letter for patient.    Patient states that she needs a letter written to the postal service stating that she needs to have her mailbox moved to the end of her walkway/sidewalk, due to her being unable to walk long distance and her unsteady gait.    Patient apologized for not being able to do the virtual visit. She is hoping to get the letter asap as she has been waiting a long time for the appointment she was unable to do today.

## 2025-03-03 NOTE — TELEPHONE ENCOUNTER
Patient called concerning her letter about her mailbox. Patient would like to know if the letter has been written and if someone can give her call to let her know when it is done.

## 2025-03-25 ENCOUNTER — APPOINTMENT (OUTPATIENT)
Facility: HOSPITAL | Age: 85
End: 2025-03-25
Payer: MEDICARE

## 2025-03-25 ENCOUNTER — HOSPITAL ENCOUNTER (EMERGENCY)
Facility: HOSPITAL | Age: 85
Discharge: HOME | End: 2025-03-25
Attending: STUDENT IN AN ORGANIZED HEALTH CARE EDUCATION/TRAINING PROGRAM
Payer: MEDICARE

## 2025-03-25 VITALS
WEIGHT: 139 LBS | HEIGHT: 61 IN | OXYGEN SATURATION: 99 % | BODY MASS INDEX: 26.24 KG/M2 | HEART RATE: 65 BPM | TEMPERATURE: 97.9 F | SYSTOLIC BLOOD PRESSURE: 122 MMHG | DIASTOLIC BLOOD PRESSURE: 57 MMHG | RESPIRATION RATE: 14 BRPM

## 2025-03-25 DIAGNOSIS — M79.605 LEFT LEG PAIN: ICD-10-CM

## 2025-03-25 DIAGNOSIS — M54.16 LUMBAR RADICULOPATHY: Primary | ICD-10-CM

## 2025-03-25 PROCEDURE — 72131 CT LUMBAR SPINE W/O DYE: CPT

## 2025-03-25 PROCEDURE — 99284 EMERGENCY DEPT VISIT MOD MDM: CPT

## 2025-03-25 RX ORDER — LIDOCAINE 50 MG/G
1 PATCH TOPICAL DAILY
Qty: 10 PATCH | Refills: 0 | Status: SHIPPED | OUTPATIENT
Start: 2025-03-25 | End: 2025-04-04

## 2025-03-25 RX ORDER — PREDNISONE 20 MG/1
40 TABLET ORAL DAILY
Qty: 10 TABLET | Refills: 0 | Status: SHIPPED | OUTPATIENT
Start: 2025-03-25 | End: 2025-03-30

## 2025-03-25 ASSESSMENT — PAIN - FUNCTIONAL ASSESSMENT
PAIN_FUNCTIONAL_ASSESSMENT: 0-10
PAIN_FUNCTIONAL_ASSESSMENT: PREVENTS OR INTERFERES SOME ACTIVE ACTIVITIES AND ADLS
PAIN_FUNCTIONAL_ASSESSMENT: NONE - DENIES PAIN

## 2025-03-25 ASSESSMENT — PAIN DESCRIPTION - LOCATION: LOCATION: LEG

## 2025-03-25 ASSESSMENT — ENCOUNTER SYMPTOMS
DIARRHEA: 0
ABDOMINAL PAIN: 0
COUGH: 0
BACK PAIN: 1
ALLERGIC/IMMUNOLOGIC NEGATIVE: 1
RESPIRATORY NEGATIVE: 1
SHORTNESS OF BREATH: 0
NAUSEA: 0
EYES NEGATIVE: 1
RHINORRHEA: 0

## 2025-03-25 ASSESSMENT — PAIN SCALES - GENERAL
PAINLEVEL_OUTOF10: 0
PAINLEVEL_OUTOF10: 5

## 2025-03-25 ASSESSMENT — PAIN DESCRIPTION - ORIENTATION: ORIENTATION: LEFT

## 2025-03-25 ASSESSMENT — PAIN DESCRIPTION - PAIN TYPE: TYPE: ACUTE PAIN

## 2025-03-25 ASSESSMENT — PAIN DESCRIPTION - FREQUENCY: FREQUENCY: CONTINUOUS

## 2025-03-25 NOTE — ED TRIAGE NOTES
Patient with hx of DM2 arrives c/o of LLE pain and tingling below the knee and numbness radiating from L hip to knee. Additionally reporting pain in L groin.eports L swelling great than right. 2+ Dorsalis Peds and Posterior Tibialis pulses in L foot    Prior hx of sciatica.

## 2025-03-25 NOTE — ED PROVIDER NOTES
[facility] DEPARTMENT  EMERGENCY DEPARTMENT ENCOUNTER      Pt Name: Lara Antonio  MRN: [id number]  Birthdate 1940  Date of evaluation: 3/25/2025  Provider: MARCUS Stratton    CHIEF COMPLAINT       Chief Complaint   Patient presents with    Leg Pain         HISTORY OF PRESENT ILLNESS   (Location/Symptom, Timing/Onset, Context/Setting, Quality, Duration, Modifying Factors, Severity)  Note limiting factors.   Lara Antonio is a 84 y.o. female  who presents by private vehicle to ER with c/o left leg pain. Patient reports pain x 3 weeks, denies any injury. Patient notes pain radiates from left hip to lower leg with associated numbness. Patient denies bowel or bladder incontinence or saddle anesthesia.     She specifically denies any fevers, chills, nausea, vomiting, chest pain, shortness of breath, headache, rash, diarrhea, abdominal pain, urinary/bowel changes, sweating or weight loss.    PCP: Melissa Ramírez APRN - CNP   PMHx significant for: Past Medical History:  2/8/2021: Acute blood loss anemia  No date: Anxiety  No date: Cancer (HCC)      Comment:  Skin of face.  No date: Cataracts, bilateral  No date: Chronic back pain      Comment:  x1 yr;seeing Chiropractor for a year  No date: Diabetes (HCC)  6/20/2022: Diverticulitis  3/10/2016: Dizziness of unknown cause  6/20/2022: Fall  No date: GERD (gastroesophageal reflux disease)  No date: Gout  No date: Hypercholesterolemia  No date: Hypertension  No date: Hypothyroidism  No date: Migraine  No date: Shingles  08/20/2024: TIA (transient ischemic attack)   PSHx significant for: Past Surgical History:  05/2017: CATARACT REMOVAL; Bilateral  No date: CHOLECYSTECTOMY      Comment:  Open cholecystectomy.  06/07/2012: COLONOSCOPY  05/30/2010: COLONOSCOPY  2/9/2021: COLONOSCOPY; N/A      Comment:  COLONOSCOPY performed by Giancarlo Nicholas MD at [facility]                ENDOSCOPY  No date: DILATION AND CURETTAGE OF UTERUS  No date: DILATION AND CURETTAGE OF

## 2025-03-25 NOTE — ED NOTES
Pt ambulatory w/cane, well-appearing, VSS upon dc. Instructed and verbalized understanding to  scripts from pharmacy and follow up w spine doctors, as well as monitor blood sugar levels while on the steroids, and return in case of emergency.

## 2025-04-24 ENCOUNTER — TELEPHONE (OUTPATIENT)
Facility: CLINIC | Age: 85
End: 2025-04-24

## 2025-04-24 SDOH — HEALTH STABILITY: PHYSICAL HEALTH: ON AVERAGE, HOW MANY DAYS PER WEEK DO YOU ENGAGE IN MODERATE TO STRENUOUS EXERCISE (LIKE A BRISK WALK)?: 0 DAYS

## 2025-04-24 ASSESSMENT — PATIENT HEALTH QUESTIONNAIRE - PHQ9
1. LITTLE INTEREST OR PLEASURE IN DOING THINGS: NOT AT ALL
SUM OF ALL RESPONSES TO PHQ QUESTIONS 1-9: 0
2. FEELING DOWN, DEPRESSED OR HOPELESS: NOT AT ALL

## 2025-04-24 ASSESSMENT — LIFESTYLE VARIABLES
HOW OFTEN DO YOU HAVE A DRINK CONTAINING ALCOHOL: NEVER
HOW MANY STANDARD DRINKS CONTAINING ALCOHOL DO YOU HAVE ON A TYPICAL DAY: PATIENT DOES NOT DRINK
HOW MANY STANDARD DRINKS CONTAINING ALCOHOL DO YOU HAVE ON A TYPICAL DAY: 0
HOW OFTEN DO YOU HAVE A DRINK CONTAINING ALCOHOL: 1
HOW OFTEN DO YOU HAVE SIX OR MORE DRINKS ON ONE OCCASION: 1

## 2025-04-28 ENCOUNTER — OFFICE VISIT (OUTPATIENT)
Facility: CLINIC | Age: 85
End: 2025-04-28
Payer: MEDICARE

## 2025-04-28 VITALS
BODY MASS INDEX: 26.62 KG/M2 | SYSTOLIC BLOOD PRESSURE: 120 MMHG | TEMPERATURE: 97.6 F | DIASTOLIC BLOOD PRESSURE: 76 MMHG | HEIGHT: 61 IN | OXYGEN SATURATION: 97 % | RESPIRATION RATE: 16 BRPM | HEART RATE: 92 BPM | WEIGHT: 141 LBS

## 2025-04-28 DIAGNOSIS — E11.22 TYPE 2 DIABETES MELLITUS WITH STAGE 3B CHRONIC KIDNEY DISEASE, WITHOUT LONG-TERM CURRENT USE OF INSULIN (HCC): ICD-10-CM

## 2025-04-28 DIAGNOSIS — G62.9 NEUROPATHY: ICD-10-CM

## 2025-04-28 DIAGNOSIS — E78.5 HYPERLIPIDEMIA, UNSPECIFIED HYPERLIPIDEMIA TYPE: ICD-10-CM

## 2025-04-28 DIAGNOSIS — E03.9 HYPOTHYROIDISM, UNSPECIFIED TYPE: ICD-10-CM

## 2025-04-28 DIAGNOSIS — N18.32 TYPE 2 DIABETES MELLITUS WITH STAGE 3B CHRONIC KIDNEY DISEASE, WITHOUT LONG-TERM CURRENT USE OF INSULIN (HCC): ICD-10-CM

## 2025-04-28 DIAGNOSIS — I10 HYPERTENSION, UNSPECIFIED TYPE: ICD-10-CM

## 2025-04-28 DIAGNOSIS — M54.32 SCIATICA OF LEFT SIDE: ICD-10-CM

## 2025-04-28 DIAGNOSIS — Z00.00 MEDICARE ANNUAL WELLNESS VISIT, SUBSEQUENT: Primary | ICD-10-CM

## 2025-04-28 PROBLEM — E78.00 HYPERCHOLESTEREMIA: Status: RESOLVED | Noted: 2017-04-04 | Resolved: 2025-04-28

## 2025-04-28 PROBLEM — N18.30 CKD (CHRONIC KIDNEY DISEASE) STAGE 3, GFR 30-59 ML/MIN (HCC): Status: RESOLVED | Noted: 2024-08-20 | Resolved: 2025-04-28

## 2025-04-28 PROCEDURE — 99214 OFFICE O/P EST MOD 30 MIN: CPT

## 2025-04-28 PROCEDURE — 1090F PRES/ABSN URINE INCON ASSESS: CPT

## 2025-04-28 PROCEDURE — 1125F AMNT PAIN NOTED PAIN PRSNT: CPT

## 2025-04-28 PROCEDURE — 1159F MED LIST DOCD IN RCRD: CPT

## 2025-04-28 PROCEDURE — G8419 CALC BMI OUT NRM PARAM NOF/U: HCPCS

## 2025-04-28 PROCEDURE — 1123F ACP DISCUSS/DSCN MKR DOCD: CPT

## 2025-04-28 PROCEDURE — 1036F TOBACCO NON-USER: CPT

## 2025-04-28 PROCEDURE — G0439 PPPS, SUBSEQ VISIT: HCPCS

## 2025-04-28 PROCEDURE — G8427 DOCREV CUR MEDS BY ELIG CLIN: HCPCS

## 2025-04-28 RX ORDER — DIPHENHYDRAMINE HCL 25 MG
25 TABLET ORAL EVERY 6 HOURS PRN
COMMUNITY

## 2025-04-28 RX ORDER — LIDOCAINE 50 MG/G
1 PATCH TOPICAL DAILY
COMMUNITY

## 2025-04-28 RX ORDER — DICLOFENAC SODIUM 75 MG/1
75 TABLET, DELAYED RELEASE ORAL 2 TIMES DAILY
Qty: 60 TABLET | Refills: 3 | Status: CANCELLED | OUTPATIENT
Start: 2025-04-28

## 2025-04-28 RX ORDER — POLYETHYLENE GLYCOL 3350 17 G/17G
17 POWDER, FOR SOLUTION ORAL DAILY PRN
COMMUNITY

## 2025-04-28 ASSESSMENT — ENCOUNTER SYMPTOMS
WHEEZING: 0
ABDOMINAL PAIN: 0
SHORTNESS OF BREATH: 0
BACK PAIN: 1
CHEST TIGHTNESS: 0

## 2025-04-28 NOTE — PROGRESS NOTES
Medicare Annual Wellness Visit    Lara Antonio is here for Medicare AWV    Assessment & Plan   Medicare annual wellness visit, subsequent  Hypertension, unspecified type  Assessment & Plan:   Chronic, at goal (stable), continue current treatment plan  Type 2 diabetes mellitus with stage 3b chronic kidney disease, without long-term current use of insulin (HCC)  Assessment & Plan:   Chronic, at goal (stable), continue current plan pending work up below  Sciatica of left side  -     [facility] - Ivan Esteban MD, Orthopedic Surgery (back, neck, spine), [place] (Jonathan Schumacher)  Neuropathy  Assessment & Plan:  -Discussed with patient possible differentials and will try to see if labs illuminating. Discussed trial of topical medication, discussed risks of repeated use of steroids. Recent imaging of tibia done, low suspicion for fracture though per chart patient does have history of osteoporosis. Suspect neuropathy but will rule out peripheral artery disease prior to starting patient on possibly sedating medication like Neurontin or Lyrica.   Orders:  -     CBC with Auto Differential  -     Vitamin D 25 Hydroxy  -     Vitamin B12  -     Magnesium  -     ELECTROPHORESIS PROTEIN, SERUM  -     Phosphorus  -     Uric Acid  -     diclofenac sodium (VOLTAREN) 1 % GEL; Apply 4 g topically 4 times daily, Topical, 4 TIMES DAILY Starting Mon 4/28/2025, Disp-100 g, R-1, Normal  -     Vascular duplex lower extremity arteries bilateral; Future  Hypothyroidism, unspecified type  Assessment & Plan:  -Unclear on whether patient is taking medication or not, will draw labs and go from there.   Orders:  -     Thyroid Cascade Profile  Hyperlipidemia, unspecified hyperlipidemia type  Assessment & Plan:   Chronic, at goal (stable), continue current treatment plan  Orders:  -     Lipid Panel     Return in about 6 months (around 10/28/2025) for Chronic Conditions .       Subjective     Medicare Annual Wellness    Patient's complete Health Risk

## 2025-04-28 NOTE — ASSESSMENT & PLAN NOTE
-Discussed with patient possible differentials and will try to see if labs illuminating. Discussed trial of topical medication, discussed risks of repeated use of steroids. Recent imaging of tibia done, low suspicion for fracture though per chart patient does have history of osteoporosis. Suspect neuropathy but will rule out peripheral artery disease prior to starting patient on possibly sedating medication like Neurontin or Lyrica.

## 2025-04-28 NOTE — PROGRESS NOTES
The patient, Lara Antonio, identity was verified by name and MRN.  Chief Complaint   Patient presents with    Medicare AWV     No LMP recorded (lmp unknown). Patient is postmenopausal.  LMP  (LMP Unknown)       4/24/2025     1:44 PM   Amb Fall Risk Assessment and TUG Test   Do you feel unsteady or are you worried about falling?  no    2 or more falls in past year? no    Fall with injury in past year? no        Proxy-reported     No data recorded  \"Have you been to the ER, urgent care clinic since your last visit?  Hospitalized since your last visit?\"    YES - When: approximately 1 months ago.  Where and Why: L leg pain.    “Have you seen or consulted any other health care providers outside our system since your last visit?”    NO             Social History     Substance and Sexual Activity   Sexual Activity Not on file     Medication list reviewed and active medications noted. Patient is taking medications as directed.  See documentation in medication activity.  Allergies: allergy list reviewed, no new allergies added    Medicare Awv Health Risk Assessment / Depression Screen       Question 4/24/2025  1:44 PM EDT - Filed by Kristal Cedillo LPN    Fall Risk Screening     Do you feel unsteady or are you worried about falling? no    Have you fallen 2 or more times in the past year?   no    Have you had any fall with injury in the past year?   no    Health Risk Assessment / General     In general, how would you say your health is? Fair    In the past 7 days, have you experienced any of the following: New or Increased Pain, New or Increased Fatigue, Loneliness, Social Isolation, Stress or Anger? No    Do you have a Living Will? Yes    Health Habits/ Nutrition     On average, how many days per week do you engage in moderate to strenuous exercise (like a brisk walk)? 0 days    On average, how many minutes do you engage in exercise at this level?       Do you eat balanced/healthy meals regularly? Yes    Have you seen the

## 2025-04-29 LAB
25(OH)D3+25(OH)D2 SERPL-MCNC: 45 NG/ML (ref 30–100)
ALBUMIN SERPL-MCNC: 3.9 G/DL (ref 3.7–4.7)
ALP SERPL-CCNC: 66 IU/L (ref 44–121)
ALT SERPL-CCNC: 17 IU/L (ref 0–32)
AST SERPL-CCNC: 16 IU/L (ref 0–40)
BASOPHILS # BLD AUTO: 0 X10E3/UL (ref 0–0.2)
BASOPHILS NFR BLD AUTO: 1 %
BILIRUB SERPL-MCNC: 0.3 MG/DL (ref 0–1.2)
BUN SERPL-MCNC: 25 MG/DL (ref 8–27)
BUN/CREAT SERPL: 26 (ref 12–28)
CALCIUM SERPL-MCNC: 9.5 MG/DL (ref 8.7–10.3)
CHLORIDE SERPL-SCNC: 107 MMOL/L (ref 96–106)
CHOLEST SERPL-MCNC: 131 MG/DL (ref 100–199)
CO2 SERPL-SCNC: 19 MMOL/L (ref 20–29)
CREAT SERPL-MCNC: 0.97 MG/DL (ref 0.57–1)
EGFRCR SERPLBLD CKD-EPI 2021: 57 ML/MIN/1.73
EOSINOPHIL # BLD AUTO: 0.2 X10E3/UL (ref 0–0.4)
EOSINOPHIL NFR BLD AUTO: 3 %
ERYTHROCYTE [DISTWIDTH] IN BLOOD BY AUTOMATED COUNT: 12.4 % (ref 11.7–15.4)
GLOBULIN SER CALC-MCNC: 2 G/DL (ref 1.5–4.5)
GLUCOSE SERPL-MCNC: 150 MG/DL (ref 70–99)
HBA1C MFR BLD: 6.2 % (ref 4.8–5.6)
HCT VFR BLD AUTO: 36.6 % (ref 34–46.6)
HDLC SERPL-MCNC: 40 MG/DL
HGB BLD-MCNC: 11.9 G/DL (ref 11.1–15.9)
IMM GRANULOCYTES # BLD AUTO: 0 X10E3/UL (ref 0–0.1)
IMM GRANULOCYTES NFR BLD AUTO: 0 %
LDLC SERPL CALC-MCNC: 60 MG/DL (ref 0–99)
LYMPHOCYTES # BLD AUTO: 1.2 X10E3/UL (ref 0.7–3.1)
LYMPHOCYTES NFR BLD AUTO: 21 %
MAGNESIUM SERPL-MCNC: 1.5 MG/DL (ref 1.6–2.3)
MCH RBC QN AUTO: 32.1 PG (ref 26.6–33)
MCHC RBC AUTO-ENTMCNC: 32.5 G/DL (ref 31.5–35.7)
MCV RBC AUTO: 99 FL (ref 79–97)
MONOCYTES # BLD AUTO: 0.7 X10E3/UL (ref 0.1–0.9)
MONOCYTES NFR BLD AUTO: 12 %
NEUTROPHILS # BLD AUTO: 3.6 X10E3/UL (ref 1.4–7)
NEUTROPHILS NFR BLD AUTO: 63 %
PHOSPHATE SERPL-MCNC: 3.8 MG/DL (ref 3–4.3)
PLATELET # BLD AUTO: 197 X10E3/UL (ref 150–450)
POTASSIUM SERPL-SCNC: 4.4 MMOL/L (ref 3.5–5.2)
PROT SERPL-MCNC: 5.9 G/DL (ref 6–8.5)
RBC # BLD AUTO: 3.71 X10E6/UL (ref 3.77–5.28)
SODIUM SERPL-SCNC: 144 MMOL/L (ref 134–144)
TRIGL SERPL-MCNC: 188 MG/DL (ref 0–149)
TSH SERPL DL<=0.005 MIU/L-ACNC: 2.31 UIU/ML (ref 0.45–4.5)
URATE SERPL-MCNC: 6.1 MG/DL (ref 3.1–7.9)
VIT B12 SERPL-MCNC: 199 PG/ML (ref 232–1245)
VLDLC SERPL CALC-MCNC: 31 MG/DL (ref 5–40)
WBC # BLD AUTO: 5.7 X10E3/UL (ref 3.4–10.8)

## 2025-05-01 LAB
ALBUMIN SERPL ELPH-MCNC: 3.4 G/DL (ref 2.9–4.4)
ALBUMIN/GLOB SERPL: 1.4 {RATIO} (ref 0.7–1.7)
ALPHA1 GLOB SERPL ELPH-MCNC: 0.2 G/DL (ref 0–0.4)
ALPHA2 GLOB SERPL ELPH-MCNC: 0.8 G/DL (ref 0.4–1)
B-GLOBULIN SERPL ELPH-MCNC: 0.9 G/DL (ref 0.7–1.3)
GAMMA GLOB SERPL ELPH-MCNC: 0.5 G/DL (ref 0.4–1.8)
GLOBULIN SER CALC-MCNC: 2.5 G/DL (ref 2.2–3.9)
LABORATORY COMMENT REPORT: NORMAL
M PROTEIN SERPL ELPH-MCNC: NORMAL G/DL

## 2025-05-13 ENCOUNTER — RESULTS FOLLOW-UP (OUTPATIENT)
Facility: CLINIC | Age: 85
End: 2025-05-13

## 2025-05-13 DIAGNOSIS — E83.42 HYPOMAGNESEMIA: Primary | ICD-10-CM

## 2025-05-13 DIAGNOSIS — R79.89 LOW VITAMIN B12 LEVEL: ICD-10-CM

## 2025-05-13 RX ORDER — MAGNESIUM OXIDE 400 MG/1
400 TABLET ORAL DAILY
Qty: 30 TABLET | Refills: 1 | Status: ON HOLD | OUTPATIENT
Start: 2025-05-13

## 2025-05-14 ENCOUNTER — HOSPITAL ENCOUNTER (INPATIENT)
Facility: HOSPITAL | Age: 85
LOS: 5 days | Discharge: HOME HEALTH | DRG: 373 | End: 2025-05-19
Attending: STUDENT IN AN ORGANIZED HEALTH CARE EDUCATION/TRAINING PROGRAM | Admitting: INTERNAL MEDICINE
Payer: MEDICARE

## 2025-05-14 ENCOUNTER — APPOINTMENT (OUTPATIENT)
Facility: HOSPITAL | Age: 85
DRG: 373 | End: 2025-05-14
Payer: MEDICARE

## 2025-05-14 DIAGNOSIS — E83.42 HYPOMAGNESEMIA: ICD-10-CM

## 2025-05-14 DIAGNOSIS — K52.9 COLITIS: ICD-10-CM

## 2025-05-14 DIAGNOSIS — R19.7 DIARRHEA OF PRESUMED INFECTIOUS ORIGIN: Primary | ICD-10-CM

## 2025-05-14 PROBLEM — R11.2 NAUSEA VOMITING AND DIARRHEA: Status: ACTIVE | Noted: 2025-05-14

## 2025-05-14 LAB
ALBUMIN SERPL-MCNC: 3.6 G/DL (ref 3.5–5.2)
ALBUMIN/GLOB SERPL: 1.4 (ref 1.1–2.2)
ALP SERPL-CCNC: 62 U/L (ref 35–104)
ALT SERPL-CCNC: 10 U/L (ref 10–35)
ANION GAP SERPL CALC-SCNC: 14 MMOL/L (ref 2–12)
AST SERPL-CCNC: 18 U/L (ref 10–35)
BASOPHILS # BLD: 0.02 K/UL (ref 0–0.1)
BASOPHILS NFR BLD: 0.3 % (ref 0–1)
BILIRUB SERPL-MCNC: 0.4 MG/DL (ref 0.2–1)
BUN SERPL-MCNC: 27 MG/DL (ref 8–23)
BUN/CREAT SERPL: 28 (ref 12–20)
CALCIUM SERPL-MCNC: 8.6 MG/DL (ref 8.8–10.2)
CHLORIDE SERPL-SCNC: 108 MMOL/L (ref 98–107)
CO2 SERPL-SCNC: 20 MMOL/L (ref 22–29)
CREAT SERPL-MCNC: 0.98 MG/DL (ref 0.5–0.9)
DIFFERENTIAL METHOD BLD: ABNORMAL
EOSINOPHIL # BLD: 0.12 K/UL (ref 0–0.4)
EOSINOPHIL NFR BLD: 1.8 % (ref 0–7)
ERYTHROCYTE [DISTWIDTH] IN BLOOD BY AUTOMATED COUNT: 12.8 % (ref 11.5–14.5)
GLOBULIN SER CALC-MCNC: 2.6 G/DL (ref 2–4)
GLUCOSE BLD STRIP.AUTO-MCNC: 147 MG/DL (ref 65–117)
GLUCOSE BLD STRIP.AUTO-MCNC: 82 MG/DL (ref 65–117)
GLUCOSE SERPL-MCNC: 100 MG/DL (ref 65–100)
HCT VFR BLD AUTO: 34.7 % (ref 35–47)
HGB BLD-MCNC: 11.9 G/DL (ref 11.5–16)
IMM GRANULOCYTES # BLD AUTO: 0.03 K/UL (ref 0–0.04)
IMM GRANULOCYTES NFR BLD AUTO: 0.4 % (ref 0–0.5)
LIPASE SERPL-CCNC: 13 U/L (ref 13–60)
LYMPHOCYTES # BLD: 1.33 K/UL (ref 0.8–3.5)
LYMPHOCYTES NFR BLD: 19.6 % (ref 12–49)
MAGNESIUM SERPL-MCNC: 1 MG/DL (ref 1.6–2.4)
MCH RBC QN AUTO: 32.8 PG (ref 26–34)
MCHC RBC AUTO-ENTMCNC: 34.3 G/DL (ref 30–36.5)
MCV RBC AUTO: 95.6 FL (ref 80–99)
MONOCYTES # BLD: 0.69 K/UL (ref 0–1)
MONOCYTES NFR BLD: 10.1 % (ref 5–13)
NEUTS SEG # BLD: 4.61 K/UL (ref 1.8–8)
NEUTS SEG NFR BLD: 67.8 % (ref 32–75)
NRBC # BLD: 0 K/UL (ref 0–0.01)
NRBC BLD-RTO: 0 PER 100 WBC
PLATELET # BLD AUTO: 208 K/UL (ref 150–400)
PMV BLD AUTO: 10.1 FL (ref 8.9–12.9)
POTASSIUM SERPL-SCNC: 4 MMOL/L (ref 3.5–5.1)
PROT SERPL-MCNC: 6.2 G/DL (ref 6.4–8.3)
RBC # BLD AUTO: 3.63 M/UL (ref 3.8–5.2)
SERVICE CMNT-IMP: ABNORMAL
SERVICE CMNT-IMP: NORMAL
SODIUM SERPL-SCNC: 142 MMOL/L (ref 136–145)
WBC # BLD AUTO: 6.8 K/UL (ref 3.6–11)

## 2025-05-14 PROCEDURE — 83735 ASSAY OF MAGNESIUM: CPT

## 2025-05-14 PROCEDURE — 82962 GLUCOSE BLOOD TEST: CPT

## 2025-05-14 PROCEDURE — 80053 COMPREHEN METABOLIC PANEL: CPT

## 2025-05-14 PROCEDURE — 6370000000 HC RX 637 (ALT 250 FOR IP): Performed by: NURSE PRACTITIONER

## 2025-05-14 PROCEDURE — 87449 NOS EACH ORGANISM AG IA: CPT

## 2025-05-14 PROCEDURE — 96361 HYDRATE IV INFUSION ADD-ON: CPT

## 2025-05-14 PROCEDURE — 2580000003 HC RX 258: Performed by: STUDENT IN AN ORGANIZED HEALTH CARE EDUCATION/TRAINING PROGRAM

## 2025-05-14 PROCEDURE — 6360000002 HC RX W HCPCS: Performed by: STUDENT IN AN ORGANIZED HEALTH CARE EDUCATION/TRAINING PROGRAM

## 2025-05-14 PROCEDURE — 94761 N-INVAS EAR/PLS OXIMETRY MLT: CPT

## 2025-05-14 PROCEDURE — 74177 CT ABD & PELVIS W/CONTRAST: CPT

## 2025-05-14 PROCEDURE — 6360000002 HC RX W HCPCS: Performed by: INTERNAL MEDICINE

## 2025-05-14 PROCEDURE — 6370000000 HC RX 637 (ALT 250 FOR IP): Performed by: INTERNAL MEDICINE

## 2025-05-14 PROCEDURE — 83690 ASSAY OF LIPASE: CPT

## 2025-05-14 PROCEDURE — 85025 COMPLETE CBC W/AUTO DIFF WBC: CPT

## 2025-05-14 PROCEDURE — 87506 IADNA-DNA/RNA PROBE TQ 6-11: CPT

## 2025-05-14 PROCEDURE — 89055 LEUKOCYTE ASSESSMENT FECAL: CPT

## 2025-05-14 PROCEDURE — 99285 EMERGENCY DEPT VISIT HI MDM: CPT

## 2025-05-14 PROCEDURE — 87324 CLOSTRIDIUM AG IA: CPT

## 2025-05-14 PROCEDURE — 36415 COLL VENOUS BLD VENIPUNCTURE: CPT

## 2025-05-14 PROCEDURE — 2580000003 HC RX 258: Performed by: INTERNAL MEDICINE

## 2025-05-14 PROCEDURE — 96360 HYDRATION IV INFUSION INIT: CPT

## 2025-05-14 PROCEDURE — 1100000000 HC RM PRIVATE

## 2025-05-14 PROCEDURE — 6360000004 HC RX CONTRAST MEDICATION: Performed by: STUDENT IN AN ORGANIZED HEALTH CARE EDUCATION/TRAINING PROGRAM

## 2025-05-14 RX ORDER — ENOXAPARIN SODIUM 100 MG/ML
40 INJECTION SUBCUTANEOUS DAILY
Status: DISCONTINUED | OUTPATIENT
Start: 2025-05-15 | End: 2025-05-19 | Stop reason: HOSPADM

## 2025-05-14 RX ORDER — CIPROFLOXACIN 2 MG/ML
400 INJECTION, SOLUTION INTRAVENOUS ONCE
Status: DISCONTINUED | OUTPATIENT
Start: 2025-05-14 | End: 2025-05-14

## 2025-05-14 RX ORDER — METRONIDAZOLE 500 MG/100ML
500 INJECTION, SOLUTION INTRAVENOUS EVERY 8 HOURS
Status: DISCONTINUED | OUTPATIENT
Start: 2025-05-14 | End: 2025-05-18

## 2025-05-14 RX ORDER — SODIUM CHLORIDE, SODIUM LACTATE, POTASSIUM CHLORIDE, AND CALCIUM CHLORIDE .6; .31; .03; .02 G/100ML; G/100ML; G/100ML; G/100ML
1000 INJECTION, SOLUTION INTRAVENOUS ONCE
Status: COMPLETED | OUTPATIENT
Start: 2025-05-14 | End: 2025-05-14

## 2025-05-14 RX ORDER — SODIUM CHLORIDE 0.9 % (FLUSH) 0.9 %
5-40 SYRINGE (ML) INJECTION EVERY 12 HOURS SCHEDULED
Status: DISCONTINUED | OUTPATIENT
Start: 2025-05-14 | End: 2025-05-19 | Stop reason: HOSPADM

## 2025-05-14 RX ORDER — INSULIN LISPRO 100 [IU]/ML
0-8 INJECTION, SOLUTION INTRAVENOUS; SUBCUTANEOUS
Status: DISCONTINUED | OUTPATIENT
Start: 2025-05-14 | End: 2025-05-19 | Stop reason: HOSPADM

## 2025-05-14 RX ORDER — LACTOBACILLUS RHAMNOSUS GG 10B CELL
1 CAPSULE ORAL
Status: DISCONTINUED | OUTPATIENT
Start: 2025-05-15 | End: 2025-05-19 | Stop reason: HOSPADM

## 2025-05-14 RX ORDER — ONDANSETRON 2 MG/ML
4 INJECTION INTRAMUSCULAR; INTRAVENOUS EVERY 6 HOURS PRN
Status: DISCONTINUED | OUTPATIENT
Start: 2025-05-14 | End: 2025-05-19 | Stop reason: HOSPADM

## 2025-05-14 RX ORDER — ACETAMINOPHEN 325 MG/1
650 TABLET ORAL EVERY 6 HOURS PRN
Status: DISCONTINUED | OUTPATIENT
Start: 2025-05-14 | End: 2025-05-19 | Stop reason: HOSPADM

## 2025-05-14 RX ORDER — ATORVASTATIN CALCIUM 10 MG/1
10 TABLET, FILM COATED ORAL NIGHTLY
Status: DISCONTINUED | OUTPATIENT
Start: 2025-05-14 | End: 2025-05-19 | Stop reason: HOSPADM

## 2025-05-14 RX ORDER — MAGNESIUM OXIDE 400 MG/1
400 TABLET ORAL DAILY
Status: DISCONTINUED | OUTPATIENT
Start: 2025-05-14 | End: 2025-05-14

## 2025-05-14 RX ORDER — ACETAMINOPHEN 650 MG/1
650 SUPPOSITORY RECTAL EVERY 6 HOURS PRN
Status: DISCONTINUED | OUTPATIENT
Start: 2025-05-14 | End: 2025-05-19 | Stop reason: HOSPADM

## 2025-05-14 RX ORDER — ASPIRIN 81 MG/1
81 TABLET ORAL DAILY
Status: DISCONTINUED | OUTPATIENT
Start: 2025-05-15 | End: 2025-05-19 | Stop reason: HOSPADM

## 2025-05-14 RX ORDER — IOPAMIDOL 755 MG/ML
100 INJECTION, SOLUTION INTRAVASCULAR
Status: COMPLETED | OUTPATIENT
Start: 2025-05-14 | End: 2025-05-14

## 2025-05-14 RX ORDER — DIPHENHYDRAMINE HCL 25 MG
25 CAPSULE ORAL EVERY 8 HOURS PRN
Status: DISCONTINUED | OUTPATIENT
Start: 2025-05-14 | End: 2025-05-19 | Stop reason: HOSPADM

## 2025-05-14 RX ORDER — LATANOPROST 50 UG/ML
1 SOLUTION/ DROPS OPHTHALMIC NIGHTLY
Status: DISCONTINUED | OUTPATIENT
Start: 2025-05-14 | End: 2025-05-19 | Stop reason: HOSPADM

## 2025-05-14 RX ORDER — SODIUM CHLORIDE 0.9 % (FLUSH) 0.9 %
5-40 SYRINGE (ML) INJECTION PRN
Status: DISCONTINUED | OUTPATIENT
Start: 2025-05-14 | End: 2025-05-19 | Stop reason: HOSPADM

## 2025-05-14 RX ORDER — LANOLIN ALCOHOL/MO/W.PET/CERES
400 CREAM (GRAM) TOPICAL DAILY
Status: DISCONTINUED | OUTPATIENT
Start: 2025-05-14 | End: 2025-05-19 | Stop reason: HOSPADM

## 2025-05-14 RX ORDER — MAGNESIUM SULFATE IN WATER 40 MG/ML
2000 INJECTION, SOLUTION INTRAVENOUS ONCE
Status: COMPLETED | OUTPATIENT
Start: 2025-05-14 | End: 2025-05-14

## 2025-05-14 RX ORDER — POLYETHYLENE GLYCOL 3350 17 G/17G
17 POWDER, FOR SOLUTION ORAL DAILY PRN
Status: DISCONTINUED | OUTPATIENT
Start: 2025-05-14 | End: 2025-05-19 | Stop reason: HOSPADM

## 2025-05-14 RX ORDER — LEVOTHYROXINE SODIUM 50 UG/1
50 TABLET ORAL
Status: DISCONTINUED | OUTPATIENT
Start: 2025-05-15 | End: 2025-05-19 | Stop reason: HOSPADM

## 2025-05-14 RX ORDER — MAGNESIUM SULFATE 1 G/100ML
1000 INJECTION INTRAVENOUS ONCE
Status: COMPLETED | OUTPATIENT
Start: 2025-05-14 | End: 2025-05-14

## 2025-05-14 RX ORDER — SODIUM CHLORIDE, SODIUM LACTATE, POTASSIUM CHLORIDE, CALCIUM CHLORIDE 600; 310; 30; 20 MG/100ML; MG/100ML; MG/100ML; MG/100ML
INJECTION, SOLUTION INTRAVENOUS CONTINUOUS
Status: DISCONTINUED | OUTPATIENT
Start: 2025-05-14 | End: 2025-05-17

## 2025-05-14 RX ORDER — LEVOFLOXACIN 5 MG/ML
750 INJECTION, SOLUTION INTRAVENOUS
Status: DISCONTINUED | OUTPATIENT
Start: 2025-05-14 | End: 2025-05-18

## 2025-05-14 RX ORDER — SODIUM CHLORIDE 9 MG/ML
INJECTION, SOLUTION INTRAVENOUS PRN
Status: DISCONTINUED | OUTPATIENT
Start: 2025-05-14 | End: 2025-05-19 | Stop reason: HOSPADM

## 2025-05-14 RX ADMIN — DIPHENHYDRAMINE HYDROCHLORIDE 25 MG: 25 CAPSULE ORAL at 20:13

## 2025-05-14 RX ADMIN — SODIUM CHLORIDE, SODIUM LACTATE, POTASSIUM CHLORIDE, AND CALCIUM CHLORIDE 1000 ML: .6; .31; .03; .02 INJECTION, SOLUTION INTRAVENOUS at 12:59

## 2025-05-14 RX ADMIN — METRONIDAZOLE 500 MG: 500 INJECTION, SOLUTION INTRAVENOUS at 23:35

## 2025-05-14 RX ADMIN — SODIUM CHLORIDE, SODIUM LACTATE, POTASSIUM CHLORIDE, AND CALCIUM CHLORIDE: .6; .31; .03; .02 INJECTION, SOLUTION INTRAVENOUS at 15:53

## 2025-05-14 RX ADMIN — FAMOTIDINE 20 MG: 10 INJECTION, SOLUTION INTRAVENOUS at 15:46

## 2025-05-14 RX ADMIN — IOPAMIDOL 100 ML: 755 INJECTION, SOLUTION INTRAVENOUS at 13:38

## 2025-05-14 RX ADMIN — MAGNESIUM SULFATE HEPTAHYDRATE 2000 MG: 40 INJECTION, SOLUTION INTRAVENOUS at 17:40

## 2025-05-14 RX ADMIN — LEVOFLOXACIN 750 MG: 5 INJECTION, SOLUTION INTRAVENOUS at 17:37

## 2025-05-14 RX ADMIN — ACETAMINOPHEN 650 MG: 325 TABLET ORAL at 23:48

## 2025-05-14 RX ADMIN — LATANOPROST 1 DROP: 50 SOLUTION OPHTHALMIC at 20:34

## 2025-05-14 RX ADMIN — SODIUM CHLORIDE, SODIUM LACTATE, POTASSIUM CHLORIDE, AND CALCIUM CHLORIDE: .6; .31; .03; .02 INJECTION, SOLUTION INTRAVENOUS at 17:24

## 2025-05-14 RX ADMIN — MAGNESIUM SULFATE HEPTAHYDRATE 1000 MG: 1 INJECTION, SOLUTION INTRAVENOUS at 15:52

## 2025-05-14 RX ADMIN — METRONIDAZOLE 500 MG: 500 INJECTION, SOLUTION INTRAVENOUS at 15:54

## 2025-05-14 RX ADMIN — ATORVASTATIN CALCIUM 10 MG: 10 TABLET, FILM COATED ORAL at 20:13

## 2025-05-14 RX ADMIN — Medication 400 MG: at 15:46

## 2025-05-14 ASSESSMENT — PAIN SCALES - GENERAL
PAINLEVEL_OUTOF10: 0
PAINLEVEL_OUTOF10: 0

## 2025-05-14 ASSESSMENT — PAIN - FUNCTIONAL ASSESSMENT: PAIN_FUNCTIONAL_ASSESSMENT: 0-10

## 2025-05-14 NOTE — ED PROVIDER NOTES
[facility] EMERGENCY DEPARTMENT  EMERGENCY DEPARTMENT ENCOUNTER      Pt Name: Lara Antonio  MRN: [id number]  Birthdate 1940  Date of evaluation: 5/14/2025  Provider: Neyda Tobias MD    CHIEF COMPLAINT       Chief Complaint   Patient presents with    Diarrhea         HISTORY OF PRESENT ILLNESS   (Location/Symptom, Timing/Onset, Context/Setting, Quality, Duration, Modifying Factors, Severity)  Note limiting factors.   The history is provided by the patient.     84-year-old female with history of anxiety, chronic back pain, diverticulitis, diabetes, GERD, gout, hyperlipidemia, hypertension, migraines, TIA, shingles, hypothyroidism presenting for diarrhea.  Reports that she has had diarrhea for 7 days.  Reports anything she eats or drinks makes her have a bowel movement.  Reports she has had watery bowel movements, reports brown stool nothing black or bloody.  Reports she has diffuse abdominal pain.  Reports no vomiting.  Denies fevers.  Reports some decrease in urination, no pain or blood in urine.  Reports no recent travel, denies sick contacts.  Reports that she has been taking Imodium but has reached her maximum dose and has still had diarrhea.  Patient reports she is been increasingly lightheaded and feeling weak.  Reports she lives at home and was worried about her strength since she has not been able to keep much food in.    Review of External Medical Records:         Nursing Notes were reviewed.      REVIEW OF SYSTEMS    (2-9 systems for level 4, 10 or more for level 5)     Except as noted above the remainder of the review of systems was reviewed and negative.       PAST MEDICAL HISTORY     Past Medical History:   Diagnosis Date    Acute blood loss anemia 2/8/2021    Anxiety     Cancer (HCC)     Skin of face.    Cataracts, bilateral     Chronic back pain     x1 yr;seeing Chiropractor for a year    Diabetes (HCC)     Diverticulitis 6/20/2022    Dizziness of unknown cause 3/10/2016    Fall 6/20/2022

## 2025-05-14 NOTE — ED TRIAGE NOTES
Pt arrives to ER via  c/o diarrhea x 7 days. Pt denies N/V. Pt reports eating and drinking but reports it just right through her.

## 2025-05-14 NOTE — H&P
Chiropractor for a year    Diabetes (HCC)     Diverticulitis 6/20/2022    Dizziness of unknown cause 3/10/2016    Fall 6/20/2022    GERD (gastroesophageal reflux disease)     Gout     Hypercholesterolemia     Hypertension     Hypothyroidism     Migraine     Shingles     TIA (transient ischemic attack) 08/20/2024        Past Surgical History:   Procedure Laterality Date    CATARACT REMOVAL Bilateral 05/2017    CHOLECYSTECTOMY      Open cholecystectomy.    COLONOSCOPY  06/07/2012    COLONOSCOPY  05/30/2010    COLONOSCOPY N/A 2/9/2021    COLONOSCOPY performed by Giancarlo Nicholas MD at [facility] ENDOSCOPY    DILATION AND CURETTAGE OF UTERUS      DILATION AND CURETTAGE OF UTERUS      HERNIA REPAIR      Umbilical herniorrhaphy with mesh.    OTHER SURGICAL HISTORY  02/2021    blood transfusion    TONSILLECTOMY      TONSILLECTOMY      UPPER GASTROINTESTINAL ENDOSCOPY  06/07/2012    Ulcers in Antrum        Social History     Tobacco Use    Smoking status: Never    Smokeless tobacco: Never   Substance Use Topics    Alcohol use: No        Family History   Problem Relation Age of Onset    Other Mother         Poliosis    Alzheimer's Disease Mother     Diabetes Mother     Heart Attack Father         Review of Systems:  (bold if positive, if negative)    Gen:  Eyes:  ENT:  CVS:  Pulm:  GI:  bdominal pain, nausea, emesis, diarrhea,:  MS:  Skin:  Psych:  Endo:  Hem:  Renal:  Neuro:          Objective:      VITALS:    Vital signs reviewed; most recent are:    Vitals:    05/14/25 1051   BP: 123/69   Pulse: 85   Resp: 16   Temp: 98.2 °F (36.8 °C)   SpO2: 96%     SpO2 Readings from Last 6 Encounters:   05/14/25 96%   04/28/25 97%   03/25/25 99%   01/19/25 97%   12/31/24 95%   12/13/24 96%    PreHospital Care  Analgesics Taken or Received PTA?: No   No intake or output data in the 24 hours ending 05/14/25 1504     Exam:     Physical Exam:    Gen:  elderly, frail, NAD  HEENT:  Pink conjunctivae, PERRL, hearing intact to voice, dry mucous

## 2025-05-14 NOTE — ED NOTES
Patient arrives as a ER to ER transfer after CT abdomen and pelvis obtained at this facility due to CT being done at [facility].  I spoke with Dr. Tobias.  Will order Cipro.  Patient to be admitted for dehydration and colitis     Jose Rubio, DO  05/14/25 144

## 2025-05-14 NOTE — ED NOTES
TRANSFER - OUT REPORT:    Verbal report given to Sandy MCCARTHY on Lara Antonio  being transferred to [facility] for routine progression of patient care       Report consisted of patient's Situation, Background, Assessment and   Recommendations(SBAR).     Information from the following report(s) Nurse Handoff Report, ED Encounter Summary, ED SBAR, and MAR was reviewed with the receiving nurse.    [place] Fall Assessment:    Presents to emergency department  because of falls (Syncope, seizure, or loss of consciousness): No  Age > 70: Yes  Altered Mental Status, Intoxication with alcohol or substance confusion (Disorientation, impaired judgment, poor safety awaremess, or inability to follow instructions): No  Impaired Mobility: Ambulates or transfers with assistive devices or assistance; Unable to ambulate or transer.: Yes  Nursing Judgement: Yes          Lines:   Peripheral IV 05/14/25 Left Antecubital (Active)       Peripheral IV 05/14/25 Right;Anterior Forearm (Active)        Opportunity for questions and clarification was provided.      Patient transported with:  Tech

## 2025-05-15 LAB
ALBUMIN SERPL-MCNC: 2.7 G/DL (ref 3.5–5)
ALBUMIN/GLOB SERPL: 1.2 (ref 1.1–2.2)
ALP SERPL-CCNC: 50 U/L (ref 45–117)
ALT SERPL-CCNC: 14 U/L (ref 12–78)
ANION GAP SERPL CALC-SCNC: 4 MMOL/L (ref 2–12)
AST SERPL-CCNC: 10 U/L (ref 15–37)
BASOPHILS # BLD: 0.02 K/UL (ref 0–0.1)
BASOPHILS NFR BLD: 0.3 % (ref 0–1)
BILIRUB SERPL-MCNC: 0.4 MG/DL (ref 0.2–1)
BUN SERPL-MCNC: 19 MG/DL (ref 6–20)
BUN/CREAT SERPL: 23 (ref 12–20)
C COLI+JEJUNI TUF STL QL NAA+PROBE: NEGATIVE
C DIFF GDH STL QL: NEGATIVE
C DIFF TOX A+B STL QL IA: NEGATIVE
C DIFF TOXIN INTERPRETATION: NORMAL
CALCIUM SERPL-MCNC: 8 MG/DL (ref 8.5–10.1)
CHLORIDE SERPL-SCNC: 114 MMOL/L (ref 97–108)
CO2 SERPL-SCNC: 24 MMOL/L (ref 21–32)
CREAT SERPL-MCNC: 0.82 MG/DL (ref 0.55–1.02)
CRP SERPL-MCNC: <0.29 MG/DL (ref 0–0.3)
DIFFERENTIAL METHOD BLD: ABNORMAL
EC STX1+STX2 GENES STL QL NAA+PROBE: NEGATIVE
EOSINOPHIL # BLD: 0.15 K/UL (ref 0–0.4)
EOSINOPHIL NFR BLD: 2.5 % (ref 0–7)
ERYTHROCYTE [DISTWIDTH] IN BLOOD BY AUTOMATED COUNT: 12.7 % (ref 11.5–14.5)
EST. AVERAGE GLUCOSE BLD GHB EST-MCNC: 120 MG/DL
ETEC ELTA+ESTB GENES STL QL NAA+PROBE: NEGATIVE
GLOBULIN SER CALC-MCNC: 2.3 G/DL (ref 2–4)
GLUCOSE BLD STRIP.AUTO-MCNC: 133 MG/DL (ref 65–117)
GLUCOSE BLD STRIP.AUTO-MCNC: 161 MG/DL (ref 65–117)
GLUCOSE BLD STRIP.AUTO-MCNC: 91 MG/DL (ref 65–117)
GLUCOSE BLD STRIP.AUTO-MCNC: 98 MG/DL (ref 65–117)
GLUCOSE SERPL-MCNC: 76 MG/DL (ref 65–100)
HBA1C MFR BLD: 5.8 % (ref 4–5.6)
HCT VFR BLD AUTO: 30 % (ref 35–47)
HGB BLD-MCNC: 10.2 G/DL (ref 11.5–16)
IMM GRANULOCYTES # BLD AUTO: 0.02 K/UL (ref 0–0.04)
IMM GRANULOCYTES NFR BLD AUTO: 0.3 % (ref 0–0.5)
LACTATE SERPL-SCNC: 0.5 MMOL/L (ref 0.4–2)
LYMPHOCYTES # BLD: 1.08 K/UL (ref 0.8–3.5)
LYMPHOCYTES NFR BLD: 17.7 % (ref 12–49)
MAGNESIUM SERPL-MCNC: 1.8 MG/DL (ref 1.6–2.4)
MCH RBC QN AUTO: 32.3 PG (ref 26–34)
MCHC RBC AUTO-ENTMCNC: 34 G/DL (ref 30–36.5)
MCV RBC AUTO: 94.9 FL (ref 80–99)
MONOCYTES # BLD: 0.69 K/UL (ref 0–1)
MONOCYTES NFR BLD: 11.3 % (ref 5–13)
NEUTS SEG # BLD: 4.15 K/UL (ref 1.8–8)
NEUTS SEG NFR BLD: 67.9 % (ref 32–75)
NRBC # BLD: 0 K/UL (ref 0–0.01)
NRBC BLD-RTO: 0 PER 100 WBC
P SHIGELLOIDES DNA STL QL NAA+PROBE: NEGATIVE
PHOSPHATE SERPL-MCNC: 2.4 MG/DL (ref 2.6–4.7)
PLATELET # BLD AUTO: 165 K/UL (ref 150–400)
PMV BLD AUTO: 10.1 FL (ref 8.9–12.9)
POTASSIUM SERPL-SCNC: 3.2 MMOL/L (ref 3.5–5.1)
PROCALCITONIN SERPL-MCNC: <0.05 NG/ML
PROT SERPL-MCNC: 5 G/DL (ref 6.4–8.2)
RBC # BLD AUTO: 3.16 M/UL (ref 3.8–5.2)
SALMONELLA SP SPAO STL QL NAA+PROBE: NEGATIVE
SERVICE CMNT-IMP: ABNORMAL
SERVICE CMNT-IMP: ABNORMAL
SERVICE CMNT-IMP: NORMAL
SERVICE CMNT-IMP: NORMAL
SHIGELLA SP+EIEC IPAH STL QL NAA+PROBE: NEGATIVE
SODIUM SERPL-SCNC: 142 MMOL/L (ref 136–145)
V CHOL+PARA+VUL DNA STL QL NAA+NON-PROBE: NEGATIVE
WBC # BLD AUTO: 6.1 K/UL (ref 3.6–11)
WBC #/AREA STL HPF: NORMAL /HPF (ref 0–4)
Y ENTEROCOL DNA STL QL NAA+NON-PROBE: NEGATIVE

## 2025-05-15 PROCEDURE — 97162 PT EVAL MOD COMPLEX 30 MIN: CPT

## 2025-05-15 PROCEDURE — 2580000003 HC RX 258: Performed by: INTERNAL MEDICINE

## 2025-05-15 PROCEDURE — 86140 C-REACTIVE PROTEIN: CPT

## 2025-05-15 PROCEDURE — 2500000003 HC RX 250 WO HCPCS: Performed by: INTERNAL MEDICINE

## 2025-05-15 PROCEDURE — 83735 ASSAY OF MAGNESIUM: CPT

## 2025-05-15 PROCEDURE — 97535 SELF CARE MNGMENT TRAINING: CPT

## 2025-05-15 PROCEDURE — 6370000000 HC RX 637 (ALT 250 FOR IP): Performed by: INTERNAL MEDICINE

## 2025-05-15 PROCEDURE — 97165 OT EVAL LOW COMPLEX 30 MIN: CPT

## 2025-05-15 PROCEDURE — 83605 ASSAY OF LACTIC ACID: CPT

## 2025-05-15 PROCEDURE — 83036 HEMOGLOBIN GLYCOSYLATED A1C: CPT

## 2025-05-15 PROCEDURE — 6360000002 HC RX W HCPCS: Performed by: INTERNAL MEDICINE

## 2025-05-15 PROCEDURE — 1100000000 HC RM PRIVATE

## 2025-05-15 PROCEDURE — 94761 N-INVAS EAR/PLS OXIMETRY MLT: CPT

## 2025-05-15 PROCEDURE — 6370000000 HC RX 637 (ALT 250 FOR IP): Performed by: FAMILY MEDICINE

## 2025-05-15 PROCEDURE — 85025 COMPLETE CBC W/AUTO DIFF WBC: CPT

## 2025-05-15 PROCEDURE — 97116 GAIT TRAINING THERAPY: CPT

## 2025-05-15 PROCEDURE — 80053 COMPREHEN METABOLIC PANEL: CPT

## 2025-05-15 PROCEDURE — 82962 GLUCOSE BLOOD TEST: CPT

## 2025-05-15 PROCEDURE — 84100 ASSAY OF PHOSPHORUS: CPT

## 2025-05-15 PROCEDURE — 84145 PROCALCITONIN (PCT): CPT

## 2025-05-15 RX ORDER — POTASSIUM CHLORIDE 7.45 MG/ML
10 INJECTION INTRAVENOUS PRN
Status: DISCONTINUED | OUTPATIENT
Start: 2025-05-15 | End: 2025-05-19 | Stop reason: HOSPADM

## 2025-05-15 RX ORDER — FAMOTIDINE 20 MG/1
20 TABLET, FILM COATED ORAL DAILY
Status: DISCONTINUED | OUTPATIENT
Start: 2025-05-16 | End: 2025-05-19 | Stop reason: HOSPADM

## 2025-05-15 RX ORDER — MAGNESIUM SULFATE IN WATER 40 MG/ML
2000 INJECTION, SOLUTION INTRAVENOUS PRN
Status: DISCONTINUED | OUTPATIENT
Start: 2025-05-15 | End: 2025-05-19 | Stop reason: HOSPADM

## 2025-05-15 RX ORDER — VITS A,C,E/LUTEIN/MINERALS 300MCG-200
1 TABLET ORAL DAILY
Status: DISCONTINUED | OUTPATIENT
Start: 2025-05-15 | End: 2025-05-19 | Stop reason: HOSPADM

## 2025-05-15 RX ORDER — LIDOCAINE 4 G/G
1 PATCH TOPICAL DAILY
Status: DISCONTINUED | OUTPATIENT
Start: 2025-05-15 | End: 2025-05-19 | Stop reason: HOSPADM

## 2025-05-15 RX ORDER — POTASSIUM CHLORIDE 750 MG/1
40 TABLET, EXTENDED RELEASE ORAL PRN
Status: DISCONTINUED | OUTPATIENT
Start: 2025-05-15 | End: 2025-05-19 | Stop reason: HOSPADM

## 2025-05-15 RX ADMIN — METRONIDAZOLE 500 MG: 500 INJECTION, SOLUTION INTRAVENOUS at 09:40

## 2025-05-15 RX ADMIN — LATANOPROST 1 DROP: 50 SOLUTION OPHTHALMIC at 21:23

## 2025-05-15 RX ADMIN — FAMOTIDINE 20 MG: 10 INJECTION, SOLUTION INTRAVENOUS at 09:34

## 2025-05-15 RX ADMIN — ENOXAPARIN SODIUM 40 MG: 100 INJECTION SUBCUTANEOUS at 09:35

## 2025-05-15 RX ADMIN — LEVOTHYROXINE SODIUM 50 MCG: 0.05 TABLET ORAL at 06:04

## 2025-05-15 RX ADMIN — SODIUM CHLORIDE, PRESERVATIVE FREE 10 ML: 5 INJECTION INTRAVENOUS at 09:34

## 2025-05-15 RX ADMIN — Medication 400 MG: at 09:33

## 2025-05-15 RX ADMIN — Medication 1 CAPSULE: at 09:33

## 2025-05-15 RX ADMIN — ATORVASTATIN CALCIUM 10 MG: 10 TABLET, FILM COATED ORAL at 21:20

## 2025-05-15 RX ADMIN — METRONIDAZOLE 500 MG: 500 INJECTION, SOLUTION INTRAVENOUS at 18:26

## 2025-05-15 RX ADMIN — SODIUM CHLORIDE, SODIUM LACTATE, POTASSIUM CHLORIDE, AND CALCIUM CHLORIDE: .6; .31; .03; .02 INJECTION, SOLUTION INTRAVENOUS at 18:26

## 2025-05-15 RX ADMIN — SODIUM CHLORIDE, SODIUM LACTATE, POTASSIUM CHLORIDE, AND CALCIUM CHLORIDE: .6; .31; .03; .02 INJECTION, SOLUTION INTRAVENOUS at 06:10

## 2025-05-15 RX ADMIN — ASPIRIN 81 MG: 81 TABLET, COATED ORAL at 09:33

## 2025-05-15 RX ADMIN — Medication 1 TABLET: at 21:20

## 2025-05-15 ASSESSMENT — PAIN SCALES - GENERAL: PAINLEVEL_OUTOF10: 3

## 2025-05-15 NOTE — CONSULTS
ml/min/1.73m2    Calcium 8.0 (L) 8.5 - 10.1 MG/DL    Total Bilirubin 0.4 0.2 - 1.0 MG/DL    ALT 14 12 - 78 U/L    AST 10 (L) 15 - 37 U/L    Alk Phosphatase 50 45 - 117 U/L    Total Protein 5.0 (L) 6.4 - 8.2 g/dL    Albumin 2.7 (L) 3.5 - 5.0 g/dL    Globulin 2.3 2.0 - 4.0 g/dL    Albumin/Globulin Ratio 1.2 1.1 - 2.2     Lactic Acid    Collection Time: 05/15/25  2:50 AM   Result Value Ref Range    Lactic Acid, Plasma 0.5 0.4 - 2.0 MMOL/L   Magnesium    Collection Time: 05/15/25  2:50 AM   Result Value Ref Range    Magnesium 1.8 1.6 - 2.4 mg/dL   Phosphorus    Collection Time: 05/15/25  2:50 AM   Result Value Ref Range    Phosphorus 2.4 (L) 2.6 - 4.7 MG/DL   C-Reactive Protein    Collection Time: 05/15/25  2:50 AM   Result Value Ref Range    CRP <0.29 0.00 - 0.30 mg/dL   Procalcitonin    Collection Time: 05/15/25  2:50 AM   Result Value Ref Range    Procalcitonin <0.05 ng/mL   Hemoglobin A1C    Collection Time: 05/15/25  2:50 AM   Result Value Ref Range    Hemoglobin A1C 5.8 (H) 4.0 - 5.6 %    Estimated Avg Glucose 120 mg/dL   POCT Glucose    Collection Time: 05/15/25  7:34 AM   Result Value Ref Range    POC Glucose 91 65 - 117 mg/dL    Performed by: Ike Gray    POCT Glucose    Collection Time: 05/15/25 11:11 AM   Result Value Ref Range    POC Glucose 161 (H) 65 - 117 mg/dL    Performed by: Ike Gray          Assessment/Plan:     Principal Problem:    Acute colitis  Active Problems:    Nausea vomiting and diarrhea    Hypomagnesemia  Resolved Problems:    * No resolved hospital problems. *       See above narrative for full detail.

## 2025-05-15 NOTE — CARE COORDINATION
Care Management Progress Note    Reason for Admission:   Diarrhea of presumed infectious origin [R19.7]  Hypomagnesemia [E83.42]  Colitis [K52.9]  Acute colitis [K52.9]         Patient Admission Status: Inpatient  RUR:  14%  Hospitalization in the last 30 days (Readmission):  No        CM reviewed EMR and pt was discussed in IDRs.    Transition Plan of Care:  GI is following for medical management   Plan is for pt to return home   Outpatient follow up  Family will transport pt home    CM will continue to follow pt until discharged.  Madeline

## 2025-05-16 LAB
ANION GAP SERPL CALC-SCNC: 6 MMOL/L (ref 2–12)
BUN SERPL-MCNC: 8 MG/DL (ref 6–20)
BUN/CREAT SERPL: 10 (ref 12–20)
CALCIUM SERPL-MCNC: 8.3 MG/DL (ref 8.5–10.1)
CHLORIDE SERPL-SCNC: 114 MMOL/L (ref 97–108)
CO2 SERPL-SCNC: 25 MMOL/L (ref 21–32)
CREAT SERPL-MCNC: 0.79 MG/DL (ref 0.55–1.02)
GLUCOSE BLD STRIP.AUTO-MCNC: 113 MG/DL (ref 65–117)
GLUCOSE BLD STRIP.AUTO-MCNC: 130 MG/DL (ref 65–117)
GLUCOSE BLD STRIP.AUTO-MCNC: 141 MG/DL (ref 65–117)
GLUCOSE BLD STRIP.AUTO-MCNC: 229 MG/DL (ref 65–117)
GLUCOSE SERPL-MCNC: 104 MG/DL (ref 65–100)
POTASSIUM SERPL-SCNC: 3.1 MMOL/L (ref 3.5–5.1)
SERVICE CMNT-IMP: ABNORMAL
SERVICE CMNT-IMP: NORMAL
SODIUM SERPL-SCNC: 145 MMOL/L (ref 136–145)

## 2025-05-16 PROCEDURE — 6360000002 HC RX W HCPCS: Performed by: INTERNAL MEDICINE

## 2025-05-16 PROCEDURE — 94761 N-INVAS EAR/PLS OXIMETRY MLT: CPT

## 2025-05-16 PROCEDURE — 6370000000 HC RX 637 (ALT 250 FOR IP): Performed by: INTERNAL MEDICINE

## 2025-05-16 PROCEDURE — 2580000003 HC RX 258: Performed by: INTERNAL MEDICINE

## 2025-05-16 PROCEDURE — 82962 GLUCOSE BLOOD TEST: CPT

## 2025-05-16 PROCEDURE — 6370000000 HC RX 637 (ALT 250 FOR IP): Performed by: FAMILY MEDICINE

## 2025-05-16 PROCEDURE — 2500000003 HC RX 250 WO HCPCS: Performed by: INTERNAL MEDICINE

## 2025-05-16 PROCEDURE — 80048 BASIC METABOLIC PNL TOTAL CA: CPT

## 2025-05-16 PROCEDURE — 1100000000 HC RM PRIVATE

## 2025-05-16 RX ADMIN — FAMOTIDINE 20 MG: 20 TABLET, FILM COATED ORAL at 08:23

## 2025-05-16 RX ADMIN — METRONIDAZOLE 500 MG: 500 INJECTION, SOLUTION INTRAVENOUS at 08:28

## 2025-05-16 RX ADMIN — INSULIN LISPRO 2 UNITS: 100 INJECTION, SOLUTION INTRAVENOUS; SUBCUTANEOUS at 12:26

## 2025-05-16 RX ADMIN — POTASSIUM CHLORIDE 40 MEQ: 750 TABLET, FILM COATED, EXTENDED RELEASE ORAL at 20:33

## 2025-05-16 RX ADMIN — ACETAMINOPHEN 650 MG: 325 TABLET ORAL at 11:07

## 2025-05-16 RX ADMIN — LATANOPROST 1 DROP: 50 SOLUTION OPHTHALMIC at 20:34

## 2025-05-16 RX ADMIN — Medication 1 TABLET: at 08:23

## 2025-05-16 RX ADMIN — ASPIRIN 81 MG: 81 TABLET, COATED ORAL at 08:23

## 2025-05-16 RX ADMIN — SODIUM CHLORIDE, SODIUM LACTATE, POTASSIUM CHLORIDE, AND CALCIUM CHLORIDE: .6; .31; .03; .02 INJECTION, SOLUTION INTRAVENOUS at 08:24

## 2025-05-16 RX ADMIN — LEVOTHYROXINE SODIUM 50 MCG: 0.05 TABLET ORAL at 05:55

## 2025-05-16 RX ADMIN — METRONIDAZOLE 500 MG: 500 INJECTION, SOLUTION INTRAVENOUS at 18:15

## 2025-05-16 RX ADMIN — Medication 400 MG: at 08:23

## 2025-05-16 RX ADMIN — ENOXAPARIN SODIUM 40 MG: 100 INJECTION SUBCUTANEOUS at 08:23

## 2025-05-16 RX ADMIN — ATORVASTATIN CALCIUM 10 MG: 10 TABLET, FILM COATED ORAL at 20:33

## 2025-05-16 RX ADMIN — SODIUM CHLORIDE, PRESERVATIVE FREE 5 ML: 5 INJECTION INTRAVENOUS at 08:24

## 2025-05-16 RX ADMIN — METRONIDAZOLE 500 MG: 500 INJECTION, SOLUTION INTRAVENOUS at 01:50

## 2025-05-16 RX ADMIN — LEVOFLOXACIN 750 MG: 5 INJECTION, SOLUTION INTRAVENOUS at 16:15

## 2025-05-16 RX ADMIN — Medication 1 CAPSULE: at 08:23

## 2025-05-16 ASSESSMENT — PAIN DESCRIPTION - ORIENTATION: ORIENTATION: ANTERIOR

## 2025-05-16 ASSESSMENT — PAIN SCALES - GENERAL
PAINLEVEL_OUTOF10: 2
PAINLEVEL_OUTOF10: 6

## 2025-05-16 ASSESSMENT — PAIN SCALES - WONG BAKER: WONGBAKER_NUMERICALRESPONSE: NO HURT

## 2025-05-16 ASSESSMENT — PAIN - FUNCTIONAL ASSESSMENT: PAIN_FUNCTIONAL_ASSESSMENT: ACTIVITIES ARE NOT PREVENTED

## 2025-05-16 ASSESSMENT — PAIN DESCRIPTION - DESCRIPTORS: DESCRIPTORS: DISCOMFORT;ACHING

## 2025-05-16 ASSESSMENT — PAIN DESCRIPTION - LOCATION: LOCATION: HEAD

## 2025-05-16 NOTE — CARE COORDINATION
Care Management Progress Note    Reason for Admission:   Diarrhea of presumed infectious origin [R19.7]  Hypomagnesemia [E83.42]  Colitis [K52.9]  Acute colitis [K52.9]         Patient Admission Status: Inpatient  RUR:  14%  Hospitalization in the last 30 days (Readmission):  No        CM reviewed EMR and pt was discussed in IDRs.  Reportedly, pt resides alone.     Transition Plan of Care:  GI is following for medical management   PT/OT evals complete; pt ambulated 50 feet on 5/15 and home health was recommended.  Outpatient follow up  Family will transport pt home     CM will continue to follow pt until discharged.  Madeline

## 2025-05-17 LAB
ANION GAP SERPL CALC-SCNC: 4 MMOL/L (ref 2–12)
BASOPHILS # BLD: 0.02 K/UL (ref 0–0.1)
BASOPHILS NFR BLD: 0.3 % (ref 0–1)
BUN SERPL-MCNC: 10 MG/DL (ref 6–20)
BUN/CREAT SERPL: 11 (ref 12–20)
CALCIUM SERPL-MCNC: 8.7 MG/DL (ref 8.5–10.1)
CHLORIDE SERPL-SCNC: 113 MMOL/L (ref 97–108)
CO2 SERPL-SCNC: 25 MMOL/L (ref 21–32)
CREAT SERPL-MCNC: 0.92 MG/DL (ref 0.55–1.02)
DIFFERENTIAL METHOD BLD: ABNORMAL
EOSINOPHIL # BLD: 0.21 K/UL (ref 0–0.4)
EOSINOPHIL NFR BLD: 3 % (ref 0–7)
ERYTHROCYTE [DISTWIDTH] IN BLOOD BY AUTOMATED COUNT: 13 % (ref 11.5–14.5)
GLUCOSE BLD STRIP.AUTO-MCNC: 123 MG/DL (ref 65–117)
GLUCOSE BLD STRIP.AUTO-MCNC: 158 MG/DL (ref 65–117)
GLUCOSE BLD STRIP.AUTO-MCNC: 164 MG/DL (ref 65–117)
GLUCOSE BLD STRIP.AUTO-MCNC: 173 MG/DL (ref 65–117)
GLUCOSE SERPL-MCNC: 142 MG/DL (ref 65–100)
HCT VFR BLD AUTO: 32 % (ref 35–47)
HGB BLD-MCNC: 10.7 G/DL (ref 11.5–16)
IMM GRANULOCYTES # BLD AUTO: 0.02 K/UL (ref 0–0.04)
IMM GRANULOCYTES NFR BLD AUTO: 0.3 % (ref 0–0.5)
LYMPHOCYTES # BLD: 1.52 K/UL (ref 0.8–3.5)
LYMPHOCYTES NFR BLD: 21.8 % (ref 12–49)
MCH RBC QN AUTO: 31.9 PG (ref 26–34)
MCHC RBC AUTO-ENTMCNC: 33.4 G/DL (ref 30–36.5)
MCV RBC AUTO: 95.5 FL (ref 80–99)
MONOCYTES # BLD: 0.73 K/UL (ref 0–1)
MONOCYTES NFR BLD: 10.5 % (ref 5–13)
NEUTS SEG # BLD: 4.47 K/UL (ref 1.8–8)
NEUTS SEG NFR BLD: 64.1 % (ref 32–75)
NRBC # BLD: 0 K/UL (ref 0–0.01)
NRBC BLD-RTO: 0 PER 100 WBC
PLATELET # BLD AUTO: 190 K/UL (ref 150–400)
PMV BLD AUTO: 10.1 FL (ref 8.9–12.9)
POTASSIUM SERPL-SCNC: 4.1 MMOL/L (ref 3.5–5.1)
RBC # BLD AUTO: 3.35 M/UL (ref 3.8–5.2)
SERVICE CMNT-IMP: ABNORMAL
SODIUM SERPL-SCNC: 142 MMOL/L (ref 136–145)
WBC # BLD AUTO: 7 K/UL (ref 3.6–11)

## 2025-05-17 PROCEDURE — 6370000000 HC RX 637 (ALT 250 FOR IP): Performed by: NURSE PRACTITIONER

## 2025-05-17 PROCEDURE — 85025 COMPLETE CBC W/AUTO DIFF WBC: CPT

## 2025-05-17 PROCEDURE — 1100000000 HC RM PRIVATE

## 2025-05-17 PROCEDURE — 2500000003 HC RX 250 WO HCPCS: Performed by: INTERNAL MEDICINE

## 2025-05-17 PROCEDURE — 6370000000 HC RX 637 (ALT 250 FOR IP): Performed by: INTERNAL MEDICINE

## 2025-05-17 PROCEDURE — 6370000000 HC RX 637 (ALT 250 FOR IP): Performed by: FAMILY MEDICINE

## 2025-05-17 PROCEDURE — 82962 GLUCOSE BLOOD TEST: CPT

## 2025-05-17 PROCEDURE — 80048 BASIC METABOLIC PNL TOTAL CA: CPT

## 2025-05-17 PROCEDURE — 6360000002 HC RX W HCPCS: Performed by: INTERNAL MEDICINE

## 2025-05-17 PROCEDURE — 94761 N-INVAS EAR/PLS OXIMETRY MLT: CPT

## 2025-05-17 PROCEDURE — 36415 COLL VENOUS BLD VENIPUNCTURE: CPT

## 2025-05-17 RX ORDER — LOPERAMIDE HYDROCHLORIDE 2 MG/1
2 CAPSULE ORAL 4 TIMES DAILY PRN
Status: DISCONTINUED | OUTPATIENT
Start: 2025-05-17 | End: 2025-05-19 | Stop reason: HOSPADM

## 2025-05-17 RX ORDER — LOPERAMIDE HYDROCHLORIDE 2 MG/1
4 CAPSULE ORAL ONCE
Status: COMPLETED | OUTPATIENT
Start: 2025-05-17 | End: 2025-05-17

## 2025-05-17 RX ADMIN — LATANOPROST 1 DROP: 50 SOLUTION OPHTHALMIC at 20:19

## 2025-05-17 RX ADMIN — Medication 1 TABLET: at 10:10

## 2025-05-17 RX ADMIN — LEVOTHYROXINE SODIUM 50 MCG: 0.05 TABLET ORAL at 06:28

## 2025-05-17 RX ADMIN — ACETAMINOPHEN 650 MG: 325 TABLET ORAL at 20:19

## 2025-05-17 RX ADMIN — LOPERAMIDE HYDROCHLORIDE 2 MG: 2 CAPSULE ORAL at 20:19

## 2025-05-17 RX ADMIN — ATORVASTATIN CALCIUM 10 MG: 10 TABLET, FILM COATED ORAL at 20:19

## 2025-05-17 RX ADMIN — METRONIDAZOLE 500 MG: 500 INJECTION, SOLUTION INTRAVENOUS at 01:46

## 2025-05-17 RX ADMIN — SODIUM CHLORIDE, PRESERVATIVE FREE 10 ML: 5 INJECTION INTRAVENOUS at 10:00

## 2025-05-17 RX ADMIN — LOPERAMIDE HYDROCHLORIDE 4 MG: 2 CAPSULE ORAL at 12:58

## 2025-05-17 RX ADMIN — METRONIDAZOLE 500 MG: 500 INJECTION, SOLUTION INTRAVENOUS at 16:38

## 2025-05-17 RX ADMIN — Medication 400 MG: at 09:50

## 2025-05-17 RX ADMIN — DICLOFENAC SODIUM TOPICAL GEL, 1% 2 G: 10 GEL TOPICAL at 20:21

## 2025-05-17 RX ADMIN — FAMOTIDINE 20 MG: 20 TABLET, FILM COATED ORAL at 09:50

## 2025-05-17 RX ADMIN — ENOXAPARIN SODIUM 40 MG: 100 INJECTION SUBCUTANEOUS at 09:49

## 2025-05-17 RX ADMIN — METRONIDAZOLE 500 MG: 500 INJECTION, SOLUTION INTRAVENOUS at 09:59

## 2025-05-17 RX ADMIN — ASPIRIN 81 MG: 81 TABLET, COATED ORAL at 09:49

## 2025-05-17 RX ADMIN — Medication 1 CAPSULE: at 09:49

## 2025-05-17 RX ADMIN — DIPHENHYDRAMINE HYDROCHLORIDE 25 MG: 25 CAPSULE ORAL at 09:54

## 2025-05-17 RX ADMIN — SODIUM CHLORIDE, PRESERVATIVE FREE 10 ML: 5 INJECTION INTRAVENOUS at 20:20

## 2025-05-17 ASSESSMENT — PAIN DESCRIPTION - ORIENTATION
ORIENTATION: LEFT

## 2025-05-17 ASSESSMENT — PAIN - FUNCTIONAL ASSESSMENT
PAIN_FUNCTIONAL_ASSESSMENT: ACTIVITIES ARE NOT PREVENTED
PAIN_FUNCTIONAL_ASSESSMENT: ACTIVITIES ARE NOT PREVENTED

## 2025-05-17 ASSESSMENT — PAIN SCALES - GENERAL
PAINLEVEL_OUTOF10: 0
PAINLEVEL_OUTOF10: 0
PAINLEVEL_OUTOF10: 2
PAINLEVEL_OUTOF10: 4
PAINLEVEL_OUTOF10: 2

## 2025-05-17 ASSESSMENT — PAIN DESCRIPTION - LOCATION
LOCATION: KNEE

## 2025-05-17 ASSESSMENT — PAIN DESCRIPTION - DESCRIPTORS
DESCRIPTORS: ACHING

## 2025-05-17 ASSESSMENT — PAIN DESCRIPTION - ONSET
ONSET: GRADUAL
ONSET: GRADUAL

## 2025-05-17 ASSESSMENT — PAIN DESCRIPTION - FREQUENCY
FREQUENCY: INTERMITTENT
FREQUENCY: INTERMITTENT

## 2025-05-17 ASSESSMENT — PAIN DESCRIPTION - PAIN TYPE
TYPE: CHRONIC PAIN
TYPE: CHRONIC PAIN
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
## 2025-05-18 LAB
GLUCOSE BLD STRIP.AUTO-MCNC: 131 MG/DL (ref 65–117)
GLUCOSE BLD STRIP.AUTO-MCNC: 137 MG/DL (ref 65–117)
GLUCOSE BLD STRIP.AUTO-MCNC: 265 MG/DL (ref 65–117)
GLUCOSE BLD STRIP.AUTO-MCNC: 93 MG/DL (ref 65–117)
SERVICE CMNT-IMP: ABNORMAL
SERVICE CMNT-IMP: NORMAL

## 2025-05-18 PROCEDURE — 1100000000 HC RM PRIVATE

## 2025-05-18 PROCEDURE — 6370000000 HC RX 637 (ALT 250 FOR IP): Performed by: FAMILY MEDICINE

## 2025-05-18 PROCEDURE — 82962 GLUCOSE BLOOD TEST: CPT

## 2025-05-18 PROCEDURE — 6360000002 HC RX W HCPCS: Performed by: INTERNAL MEDICINE

## 2025-05-18 PROCEDURE — 2500000003 HC RX 250 WO HCPCS: Performed by: INTERNAL MEDICINE

## 2025-05-18 PROCEDURE — 6370000000 HC RX 637 (ALT 250 FOR IP): Performed by: INTERNAL MEDICINE

## 2025-05-18 PROCEDURE — 97535 SELF CARE MNGMENT TRAINING: CPT

## 2025-05-18 PROCEDURE — 6370000000 HC RX 637 (ALT 250 FOR IP): Performed by: NURSE PRACTITIONER

## 2025-05-18 PROCEDURE — 94761 N-INVAS EAR/PLS OXIMETRY MLT: CPT

## 2025-05-18 PROCEDURE — 97116 GAIT TRAINING THERAPY: CPT

## 2025-05-18 RX ORDER — METRONIDAZOLE 250 MG/1
500 TABLET ORAL EVERY 8 HOURS
Status: DISCONTINUED | OUTPATIENT
Start: 2025-05-18 | End: 2025-05-19 | Stop reason: HOSPADM

## 2025-05-18 RX ORDER — LEVOFLOXACIN 750 MG/1
750 TABLET, FILM COATED ORAL
Status: DISCONTINUED | OUTPATIENT
Start: 2025-05-18 | End: 2025-05-19 | Stop reason: HOSPADM

## 2025-05-18 RX ADMIN — LATANOPROST 1 DROP: 50 SOLUTION OPHTHALMIC at 20:14

## 2025-05-18 RX ADMIN — ENOXAPARIN SODIUM 40 MG: 100 INJECTION SUBCUTANEOUS at 10:15

## 2025-05-18 RX ADMIN — Medication 400 MG: at 10:15

## 2025-05-18 RX ADMIN — DICLOFENAC SODIUM TOPICAL GEL, 1% 2 G: 10 GEL TOPICAL at 20:13

## 2025-05-18 RX ADMIN — SODIUM CHLORIDE, PRESERVATIVE FREE 10 ML: 5 INJECTION INTRAVENOUS at 10:16

## 2025-05-18 RX ADMIN — METRONIDAZOLE 500 MG: 250 TABLET ORAL at 18:06

## 2025-05-18 RX ADMIN — ATORVASTATIN CALCIUM 10 MG: 10 TABLET, FILM COATED ORAL at 20:12

## 2025-05-18 RX ADMIN — METRONIDAZOLE 500 MG: 500 INJECTION, SOLUTION INTRAVENOUS at 02:09

## 2025-05-18 RX ADMIN — DIPHENHYDRAMINE HYDROCHLORIDE 25 MG: 25 CAPSULE ORAL at 20:12

## 2025-05-18 RX ADMIN — INSULIN LISPRO 4 UNITS: 100 INJECTION, SOLUTION INTRAVENOUS; SUBCUTANEOUS at 12:40

## 2025-05-18 RX ADMIN — ACETAMINOPHEN 650 MG: 325 TABLET ORAL at 04:55

## 2025-05-18 RX ADMIN — LEVOTHYROXINE SODIUM 50 MCG: 0.05 TABLET ORAL at 04:56

## 2025-05-18 RX ADMIN — DICLOFENAC SODIUM TOPICAL GEL, 1% 2 G: 10 GEL TOPICAL at 10:17

## 2025-05-18 RX ADMIN — Medication 1 CAPSULE: at 10:15

## 2025-05-18 RX ADMIN — FAMOTIDINE 20 MG: 20 TABLET, FILM COATED ORAL at 10:18

## 2025-05-18 RX ADMIN — LEVOFLOXACIN 750 MG: 750 TABLET, FILM COATED ORAL at 18:06

## 2025-05-18 RX ADMIN — Medication 1 TABLET: at 10:24

## 2025-05-18 RX ADMIN — METRONIDAZOLE 500 MG: 500 INJECTION, SOLUTION INTRAVENOUS at 10:26

## 2025-05-18 RX ADMIN — ACETAMINOPHEN 650 MG: 325 TABLET ORAL at 20:12

## 2025-05-18 RX ADMIN — ACETAMINOPHEN 650 MG: 325 TABLET ORAL at 10:24

## 2025-05-18 RX ADMIN — ASPIRIN 81 MG: 81 TABLET, COATED ORAL at 10:16

## 2025-05-18 ASSESSMENT — PAIN DESCRIPTION - LOCATION: LOCATION: HEAD

## 2025-05-18 ASSESSMENT — PAIN SCALES - GENERAL: PAINLEVEL_OUTOF10: 3

## 2025-05-18 ASSESSMENT — PAIN DESCRIPTION - DESCRIPTORS: DESCRIPTORS: ACHING
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
## 2025-05-18 NOTE — PLAN OF CARE
Problem: Chronic Conditions and Co-morbidities  Goal: Patient's chronic conditions and co-morbidity symptoms are monitored and maintained or improved  Outcome: Progressing  Flowsheets  Taken 5/14/2025 2045 by Marianne Tamayo, RN  Care Plan - Patient's Chronic Conditions and Co-Morbidity Symptoms are Monitored and Maintained or Improved:   Monitor and assess patient's chronic conditions and comorbid symptoms for stability, deterioration, or improvement   Collaborate with multidisciplinary team to address chronic and comorbid conditions and prevent exacerbation or deterioration  Taken 5/14/2025 1715 by Sandy Willis, RN  Care Plan - Patient's Chronic Conditions and Co-Morbidity Symptoms are Monitored and Maintained or Improved: Monitor and assess patient's chronic conditions and comorbid symptoms for stability, deterioration, or improvement     Problem: Pain  Goal: Verbalizes/displays adequate comfort level or baseline comfort level  Outcome: Progressing     Problem: ABCDS Injury Assessment  Goal: Absence of physical injury  Outcome: Progressing     Problem: Safety - Adult  Goal: Free from fall injury  Outcome: Progressing     
  Problem: Occupational Therapy - Adult  Goal: By Discharge: Performs self-care activities at highest level of function for planned discharge setting.  See evaluation for individualized goals.  Description: FUNCTIONAL STATUS PRIOR TO ADMISSION:  Patient reports independence with ADL tasks and mobility.    , Prior Level of Assist for ADLs: Independent,  ,  ,  ,  ,  , Prior Level of Assist for Homemaking: Independent,  , Prior Level of Assist for Transfers: Independent, Active : Yes     HOME SUPPORT: Patient lived alone and did not require assistance.    Occupational Therapy Goals:  Initiated 5/15/2025  1.  Patient will perform lower body dressing with Modified [place] within 7 day(s).  2.  Patient will perform grooming with Modified [place] within 7 day(s).  3.  Patient will perform toilet transfers with Modified [place]  within 7 day(s).  4.  Patient will perform all aspects of toileting with Modified [place] within 7 day(s).  5.  Patient will participate in upper extremity therapeutic exercise/activities with Modified [place] for 10 minutes within 7 day(s).    6.  Patient will utilize energy conservation techniques during functional activities with verbal cues within 7 day(s).    Outcome: Progressing   OCCUPATIONAL THERAPY TREATMENT  Patient: Lara Antonio (84 y.o. female)  Date: 5/18/2025  Primary Diagnosis: Diarrhea of presumed infectious origin [R19.7]  Hypomagnesemia [E83.42]  Colitis [K52.9]  Acute colitis [K52.9]       Precautions: Contact Precautions (Cdiff)                Chart, occupational therapy assessment, plan of care, and goals were reviewed.    ASSESSMENT  Patient continues to benefit from skilled OT services and is progressing towards goals. Patient requires CGA/SBA today for functional mobility and ADL tasks.  Discussed difficulty with stairs for clearance home, which was patient's desire.  However, son present in room and reports patient already has senior 
  Problem: Physical Therapy - Adult  Goal: By Discharge: Performs mobility at highest level of function for planned discharge setting.  See evaluation for individualized goals.  Description: FUNCTIONAL STATUS PRIOR TO ADMISSION: Patient was independent and active without use of DME. She continues to drive    HOME SUPPORT PRIOR TO ADMISSION: The patient lived alone with son locally to provide assistance.      Physical Therapy Goals  Initiated 5/15/2025  1.  Patient will move from supine to sit and sit to supine in bed with independence within 7 day(s).    2.  Patient will perform sit to stand with independence within 7 day(s).  3.  Patient will transfer from bed to chair and chair to bed with supervision/set-up using the least restrictive device within 7 day(s).  4.  Patient will ambulate with supervision/set-up for 150 feet with the least restrictive device within 7 day(s).   5.  Patient will ascend/descend flight of stairs with 1 handrail(s) with contact guard assist within 7 day(s).  Outcome: Progressing   PHYSICAL THERAPY TREATMENT    Patient: Lara Antonio (84 y.o. female)  Date: 5/18/2025  Diagnosis: Diarrhea of presumed infectious origin [R19.7]  Hypomagnesemia [E83.42]  Colitis [K52.9]  Acute colitis [K52.9] Acute colitis      Precautions: Restrictions/Precautions  Restrictions/Precautions: Fall Risk            ASSESSMENT:  Patient continues to benefit from skilled PT services and is slowly progressing towards goals. Pt received sitting up in chair.  Pt not only has personal home, yet has reserved apt in [address] and has partially moved in.  Her son lives 2 doors down from her in the [facility].  Son present for this tx session expressing concerns with mother returning to her home alone needing to manage a total of 20 stairs.  Pt not as concerned.  Walked 150' needing RW at this time due to overall weakness s/p colitis event.  Stair management of 8 side stepping holding R rail needing Min A due to safety concerns and 
  Problem: Safety - Adult  Goal: Free from fall injury  Outcome: Progressing     Problem: ABCDS Injury Assessment  Goal: Absence of physical injury  5/15/2025 2258 by Bre Craven RN  Outcome: Progressing     Problem: Pain  Goal: Verbalizes/displays adequate comfort level or baseline comfort level  5/15/2025 2258 by Bre Craven RN  Outcome: Progressing  Problem: Chronic Conditions and Co-morbidities  Goal: Patient's chronic conditions and co-morbidity symptoms are monitored and maintained or improved  5/15/2025 2258 by Bre Craven RN  Outcome: Progressing     Problem: Pain  Goal: Verbalizes/displays adequate comfort level or baseline comfort level  5/15/2025 2258 by Bre Craven RN  Outcome: Progressing     
patient history:   Social/Functional History  Lives With: Alone  Type of Home: House  Home Layout: Multi-level (4)  Home Access: Level entry  Bathroom Shower/Tub: Walk-in shower  Bathroom Toilet: Handicap height  Bathroom Equipment: None  Home Equipment: Rollator, Cane - Quad  Has the patient had two or more falls in the past year or any fall with injury in the past year?: No  Prior Level of Assist for ADLs: Independent  Prior Level of Assist for Homemaking: Independent  Homemaking Responsibilities: Yes  Prior Level of Assist for Transfers: Independent  Active : Yes      Hand Dominance: right     EXAMINATION OF PERFORMANCE DEFICITS:    Cognitive/Behavioral Status:  Orientation  Orientation Level: Oriented X4       Skin: intact as seen    Edema: none noted     Hearing:    [place], aides at home     Vision/Perceptual:    Vision - Basic Assessment  Prior Vision: Wears glasses all the time     Vision  Vision: Impaired  Vision Exceptions: Wears glasses at all times               Range of Motion:   AROM: Within functional limits         Strength:  Strength: Within functional limits      Coordination:  Coordination: Within functional limits            Tone & Sensation:   Tone: Normal  Sensation: Intact          Functional Mobility and Transfers for ADLs:    Bed Mobility:     Bed Mobility Training  Bed Mobility Training: Yes  Rolling: Stand by assistance  Supine to Sit: Contact guard assistance  Scooting: Contact guard assistance    Transfers:      Transfer Training  Transfer Training: Yes  Overall Level of Assistance: Contact guard assistance;Stand by assistance  Interventions: Verbal cues;Tactile cues  Sit to Stand: Stand by assistance;Contact guard assistance  Stand to Sit: Contact guard assistance;Stand by assistance                     Balance:      Balance  Sitting: Intact  Standing: Intact;High guard;With support      ADL Assessment:          Feeding: Independent       Grooming: Contact guard assistance       UE 
impact the outcome/ POC  LOW Complexity : 1-2 Standardized tests and measures addressing body structure, function, activity limitation and / or participation in recreation  LOW Complexity : Stable, uncomplicated  AM-PAC  LOW    Based on the above components, the patient evaluation is determined to be of the following complexity level: Low

## 2025-05-19 VITALS
RESPIRATION RATE: 18 BRPM | TEMPERATURE: 97.7 F | HEART RATE: 78 BPM | DIASTOLIC BLOOD PRESSURE: 77 MMHG | BODY MASS INDEX: 27.39 KG/M2 | WEIGHT: 145.06 LBS | HEIGHT: 61 IN | OXYGEN SATURATION: 96 % | SYSTOLIC BLOOD PRESSURE: 138 MMHG

## 2025-05-19 LAB
GLUCOSE BLD STRIP.AUTO-MCNC: 125 MG/DL (ref 65–117)
GLUCOSE BLD STRIP.AUTO-MCNC: 168 MG/DL (ref 65–117)
SERVICE CMNT-IMP: ABNORMAL
SERVICE CMNT-IMP: ABNORMAL

## 2025-05-19 PROCEDURE — 2500000003 HC RX 250 WO HCPCS: Performed by: INTERNAL MEDICINE

## 2025-05-19 PROCEDURE — 6370000000 HC RX 637 (ALT 250 FOR IP): Performed by: INTERNAL MEDICINE

## 2025-05-19 PROCEDURE — 6360000002 HC RX W HCPCS: Performed by: INTERNAL MEDICINE

## 2025-05-19 PROCEDURE — 82962 GLUCOSE BLOOD TEST: CPT

## 2025-05-19 PROCEDURE — 6370000000 HC RX 637 (ALT 250 FOR IP): Performed by: FAMILY MEDICINE

## 2025-05-19 RX ORDER — LOPERAMIDE HYDROCHLORIDE 2 MG/1
2 CAPSULE ORAL 4 TIMES DAILY PRN
Qty: 12 CAPSULE | Refills: 0 | Status: SHIPPED | OUTPATIENT
Start: 2025-05-19 | End: 2025-05-22

## 2025-05-19 RX ORDER — METRONIDAZOLE 500 MG/1
500 TABLET ORAL EVERY 8 HOURS
Qty: 9 TABLET | Refills: 0 | Status: SHIPPED | OUTPATIENT
Start: 2025-05-19 | End: 2025-05-22

## 2025-05-19 RX ORDER — LEVOFLOXACIN 750 MG/1
750 TABLET, FILM COATED ORAL
Qty: 2 TABLET | Refills: 0 | Status: SHIPPED | OUTPATIENT
Start: 2025-05-20 | End: 2025-05-23

## 2025-05-19 RX ADMIN — FAMOTIDINE 20 MG: 20 TABLET, FILM COATED ORAL at 09:13

## 2025-05-19 RX ADMIN — Medication 400 MG: at 09:13

## 2025-05-19 RX ADMIN — ASPIRIN 81 MG: 81 TABLET, COATED ORAL at 09:13

## 2025-05-19 RX ADMIN — SODIUM CHLORIDE, PRESERVATIVE FREE 10 ML: 5 INJECTION INTRAVENOUS at 09:13

## 2025-05-19 RX ADMIN — Medication 1 CAPSULE: at 09:13

## 2025-05-19 RX ADMIN — LEVOTHYROXINE SODIUM 50 MCG: 0.05 TABLET ORAL at 06:09

## 2025-05-19 RX ADMIN — METRONIDAZOLE 500 MG: 250 TABLET ORAL at 09:13

## 2025-05-19 RX ADMIN — Medication 1 TABLET: at 10:06

## 2025-05-19 RX ADMIN — ENOXAPARIN SODIUM 40 MG: 100 INJECTION SUBCUTANEOUS at 09:12

## 2025-05-19 RX ADMIN — METRONIDAZOLE 500 MG: 250 TABLET ORAL at 01:39

## 2025-05-19 NOTE — PROGRESS NOTES
Physician Progress Note      PATIENT:               AI OWENS  CSN #:                  [id number]  :                       1940  ADMIT DATE:       2025 2:45 PM  DISCH DATE:  RESPONDING  PROVIDER #:        Vitaliy Delgado MD          QUERY TEXT:    The patient had Colitis is documented in the H&P by Miguel Galdamez MD on .   Please specify the type of colitis such as:    The clinical indicators include:  This is a 84-year-old white male who was presented to the hospital with mild,   diffused abd pain, nausea, vomiting found Acute colitis and pt had   Hypomagnesemia, Dehydration    -\"Acute colitis/diarrhea: suspect viral gastroenteritis\" documented in the H&P   by Miguel Galdamez MD on .    -\"Acute colitis\" documented in the PN by Sanjuana Rubio MD on 05/15.    -  CT abdomen reveled showed rectosigmoid colitis.    -pt on Empirically start on IV Levaquin,flagyl, pro-biotics and consult GI.    Thank you,  Duong Kasper [facility] CDS.  Options provided:  -- Bacterial Colitis  -- viral gastroenteritis., This patient has viral gastroenteritis.  -- Other - I will add my own diagnosis  -- Disagree - Not applicable / Not valid  -- Disagree - Clinically unable to determine / Unknown  -- Refer to Clinical Documentation Reviewer    PROVIDER RESPONSE TEXT:    This patient has bacterial colitis.    Query created by: Duong Kasper on 2025 2:09 AM      Electronically signed by:  Vitaliy Delgado MD 2025 7:47 AM          
[facility]  [address] [place] [address] [phone]    [facility] Adult  Hospitalist Group                                                                                          Hospitalist Progress Note  Vitaliy Delgado MD        Date of Service:  2025  NAME:  Lara Antonio  :  1940  MRN:  [id number]      Interval history / Subjective:   No acute events overnight. Finally having improvement in Bms. She worked with PT today and did not clear stairs, and is unsafe to dc home.      Assessment & Plan:     Acute colitis  - Continue empiric Levaquin/Flagyl and probiotics  - Stool studies, C. difficile both negative  - Added imodium  - Had planned to dc home today, but pt has 20 steps to her bedroom and she did not safely clear steps with PT. Alternatively, she just moved into an apt at her [facility], but has not yet moved in her bed. Her son will move her bed and she will dc to apt where there are no stairs tomorrow    Dehydration  Hypomagnesemia  Hypokalemia  - Suspect due to diarrhea due to the above  - Improving; stop IVF    Hypertension  - Patient is currently normotensive so we will continue to hold atenolol    Type 2 diabetes  - Recent A1c was 6.2  - Continue SSI per protocol    History of TIA  - Continue aspirin    Outisde Records, prior notes, labs, radiology, and medications reviewed     Code status: Full code  DVT prophylaxis: Lovenox  AARON 24-48 hrs pending PT eval on steps       Hospital Problems           Last Modified POA    * (Principal) Acute colitis 2025 Yes    Nausea vomiting and diarrhea 2025 Yes    Hypomagnesemia 2025 Yes          Review of Systems:   Pertinent items are noted in HPI.       Vital Signs:    Last 24hrs VS reviewed since prior progress note. Most recent are:  Vitals:    25 1523   BP: (!) 130/59   Pulse: 76   Resp: 18   Temp: 97.7 °F (36.5 °C)   SpO2: 96%         Intake/Output Summary (Last 24 
Patient has discharge paperwork. All questions were answered. All belongings are packed and IV removed. Son coming to transport patient.  
Pharmacy Dosing Services: 5/15/2025    The pharmacist has determined that this patient meets P & T approved criteria for conversion from IV to oral therapy for the following medication: Famotidine      The pharmacist has written the following order for the patient: Famotidine 20 mg PO daily    The pharmacist will continue to monitor the patient's status and advise the physician if conversion back to IV therapy is recommended.    Thanks,   Lan Avitia, HannahD    
No wheezing/rhonchi/rales. No accessory muscle use   CV:  Regular rhythm, normal rate, no murmurs, gallops, rubs    GI:  Soft, non distended, non tender. normoactive bowel sounds, no hepatosplenomegaly     Musculoskeletal:  No edema, warm, 2+ pulses throughout    Neurologic:  Moves all extremities.  AAOx3, CN II-XII reviewed     Psych:  Good insight, Not anxious nor agitated.       Data Review:    Review and/or order of clinical lab test      Labs:     Recent Labs     05/15/25  0250 05/17/25  0217   WBC 6.1 7.0   HGB 10.2* 10.7*   HCT 30.0* 32.0*    190     Recent Labs     05/15/25  0250 05/16/25  0706 05/17/25 0217    145 142   K 3.2* 3.1* 4.1   * 114* 113*   CO2 24 25 25   BUN 19 8 10   MG 1.8  --   --    PHOS 2.4*  --   --      Lab Results   Component Value Date/Time    ALT 14 05/15/2025 02:50 AM    ALT 10 05/14/2025 11:55 AM    ALT 17 04/28/2025 03:28 PM    GLOB 2.3 05/15/2025 02:50 AM    GLOB 2.6 05/14/2025 11:55 AM    GLOB 2.6 01/19/2025 06:53 AM     Lab Results   Component Value Date/Time    INR 1.1 02/08/2021 01:24 AM    APTT <20.0 02/08/2021 01:24 AM      Lab Results   Component Value Date/Time    IRON 93 02/09/2021 03:40 AM    TIBC 276 02/09/2021 03:40 AM      No results found for: \"RBCF\"   No results for input(s): \"PH\", \"PCO2\", \"PO2\" in the last 72 hours.  No results found for: \"CPK\"  Lab Results   Component Value Date/Time    CHOL 131 04/28/2025 03:28 PM    HDL 40 04/28/2025 03:28 PM    LDL 60 04/28/2025 03:28 PM    LDL 52 11/29/2023 03:11 PM     No results found for: \"GLUCPOC\"  No results found for: \"UA\"      Medications Reviewed:     Current Facility-Administered Medications   Medication Dose Route Frequency    loperamide (IMODIUM) capsule 2 mg  2 mg Oral 4x Daily PRN    diclofenac sodium (VOLTAREN) 1 % gel 2 g  2 g Topical BID    potassium chloride (KLOR-CON) extended release tablet 40 mEq  40 mEq Oral PRN    Or    potassium bicarb-citric acid (EFFER-K) effervescent tablet 40 mEq  
mmol IntraVENous PRN    famotidine (PEPCID) tablet 20 mg  20 mg Oral Daily    lidocaine 4 % external patch 1 patch  1 patch TransDERmal Daily    antioxidant multivitamin (OCUVITE) tablet  1 tablet Oral Daily    sodium chloride flush 0.9 % injection 5-40 mL  5-40 mL IntraVENous 2 times per day    sodium chloride flush 0.9 % injection 5-40 mL  5-40 mL IntraVENous PRN    0.9 % sodium chloride infusion   IntraVENous PRN    enoxaparin (LOVENOX) injection 40 mg  40 mg SubCUTAneous Daily    ondansetron (ZOFRAN) injection 4 mg  4 mg IntraVENous Q6H PRN    polyethylene glycol (GLYCOLAX) packet 17 g  17 g Oral Daily PRN    acetaminophen (TYLENOL) tablet 650 mg  650 mg Oral Q6H PRN    Or    acetaminophen (TYLENOL) suppository 650 mg  650 mg Rectal Q6H PRN    lactated ringers infusion   IntraVENous Continuous    lactobacillus (CULTURELLE) capsule 1 capsule  1 capsule Oral Daily with breakfast    levoFLOXacin (LEVAQUIN) 750 MG/150ML infusion 750 mg  750 mg IntraVENous Q48H    metroNIDAZOLE (FLAGYL) 500 mg in 0.9% NaCl 100 mL IVPB premix  500 mg IntraVENous Q8H    insulin lispro (HUMALOG,ADMELOG) injection vial 0-8 Units  0-8 Units SubCUTAneous 4x Daily AC & HS    aspirin EC tablet 81 mg  81 mg Oral Daily    latanoprost (XALATAN) 0.005 % ophthalmic solution 1 drop  1 drop Both Eyes Nightly    levothyroxine (SYNTHROID) tablet 50 mcg  50 mcg Oral QAM AC    atorvastatin (LIPITOR) tablet 10 mg  10 mg Oral Nightly    magnesium oxide (MAG-OX) tablet 400 mg  400 mg Oral Daily    diphenhydrAMINE (BENADRYL) capsule 25 mg  25 mg Oral Q8H PRN       Objective:     VITALS:   Last 24hrs VS reviewed since prior progress note. Most recent are:  Vitals:    25 0805   BP: (!) 145/63   Pulse: 72   Resp: 18   Temp: 97.9 °F (36.6 °C)   SpO2:      Temp (24hrs), Av.9 °F (36.6 °C), Min:97.3 °F (36.3 °C), Max:98.6 °F (37 °C)      Intake/Output Summary (Last 24 hours) at 2025 1011  Last data filed at 5/15/2025 2312  Gross per 24 hour 
oropharynx benign.    Resp:  CTA bilaterally. No wheezing/rhonchi/rales. No accessory muscle use   CV:  Regular rhythm, normal rate, no murmurs, gallops, rubs    GI:  Soft, non distended, non tender. normoactive bowel sounds, no hepatosplenomegaly     Musculoskeletal:  No edema, warm, 2+ pulses throughout    Neurologic:  Moves all extremities.  AAOx3, CN II-XII reviewed     Psych:  Good insight, Not anxious nor agitated.       Data Review:    Review and/or order of clinical lab test      Labs:     Recent Labs     05/14/25  1258 05/15/25  0250   WBC 6.8 6.1   HGB 11.9 10.2*   HCT 34.7* 30.0*    165     Recent Labs     05/14/25  1155 05/15/25  0250    142   K 4.0 3.2*   * 114*   CO2 20* 24   BUN 27* 19   MG 1.0* 1.8   PHOS  --  2.4*     Lab Results   Component Value Date/Time    ALT 14 05/15/2025 02:50 AM    ALT 10 05/14/2025 11:55 AM    ALT 17 04/28/2025 03:28 PM    GLOB 2.3 05/15/2025 02:50 AM    GLOB 2.6 05/14/2025 11:55 AM    GLOB 2.6 01/19/2025 06:53 AM     Lab Results   Component Value Date/Time    INR 1.1 02/08/2021 01:24 AM    APTT <20.0 02/08/2021 01:24 AM      Lab Results   Component Value Date/Time    IRON 93 02/09/2021 03:40 AM    TIBC 276 02/09/2021 03:40 AM      No results found for: \"RBCF\"   No results for input(s): \"PH\", \"PCO2\", \"PO2\" in the last 72 hours.  No results found for: \"CPK\"  Lab Results   Component Value Date/Time    CHOL 131 04/28/2025 03:28 PM    HDL 40 04/28/2025 03:28 PM    LDL 60 04/28/2025 03:28 PM    LDL 52 11/29/2023 03:11 PM     No results found for: \"GLUCPOC\"  No results found for: \"UA\"      Medications Reviewed:     Current Facility-Administered Medications   Medication Dose Route Frequency    potassium chloride (KLOR-CON) extended release tablet 40 mEq  40 mEq Oral PRN    Or    potassium bicarb-citric acid (EFFER-K) effervescent tablet 40 mEq  40 mEq Oral PRN    Or    potassium chloride 10 mEq/100 mL IVPB (Peripheral Line)  10 mEq IntraVENous PRN    magnesium 
(ZOFRAN) injection 4 mg  4 mg IntraVENous Q6H PRN    polyethylene glycol (GLYCOLAX) packet 17 g  17 g Oral Daily PRN    famotidine (PEPCID) 20 MG/2ML 20 mg in sodium chloride (PF) 0.9 % 10 mL injection  20 mg IntraVENous Daily    acetaminophen (TYLENOL) tablet 650 mg  650 mg Oral Q6H PRN    Or    acetaminophen (TYLENOL) suppository 650 mg  650 mg Rectal Q6H PRN    lactated ringers infusion   IntraVENous Continuous    lactobacillus (CULTURELLE) capsule 1 capsule  1 capsule Oral Daily with breakfast    levoFLOXacin (LEVAQUIN) 750 MG/150ML infusion 750 mg  750 mg IntraVENous Q48H    metroNIDAZOLE (FLAGYL) 500 mg in 0.9% NaCl 100 mL IVPB premix  500 mg IntraVENous Q8H    insulin lispro (HUMALOG,ADMELOG) injection vial 0-8 Units  0-8 Units SubCUTAneous 4x Daily AC & HS    aspirin EC tablet 81 mg  81 mg Oral Daily    latanoprost (XALATAN) 0.005 % ophthalmic solution 1 drop  1 drop Both Eyes Nightly    levothyroxine (SYNTHROID) tablet 50 mcg  50 mcg Oral QAM AC    atorvastatin (LIPITOR) tablet 10 mg  10 mg Oral Nightly    magnesium oxide (MAG-OX) tablet 400 mg  400 mg Oral Daily    diphenhydrAMINE (BENADRYL) capsule 25 mg  25 mg Oral Q8H PRN     ______________________________________________________________________  EXPECTED LENGTH OF STAY:    ACTUAL LENGTH OF STAY:          1                 Sanjuana Rubio MD

## 2025-05-19 NOTE — CARE COORDINATION
5/19/2025  4:59 PM  Care Management Progress Note    Reason for Admission:   Diarrhea of presumed infectious origin [R19.7]  Hypomagnesemia [E83.42]  Colitis [K52.9]  Acute colitis [K52.9]         Patient Admission Status: Inpatient  RUR: 11%  Hospitalization in the last 30 days (Readmission):  No        Transition of care plan:  Pt discussed in IDR, medically stable for DC   PT/OT treating, order for ac RW, CM met w/ pt she is agreeable, does not own this DME, FOC offered, prefers [employer], pt is not covered for this DME, she is agreeable to purchase for $85.22 oop.  DME has been delivered to bedside   DC top [place] w/ family assistance and [facility]   CM sent AVS to [facility] in [facility] [place] , notified of DC today    Discharge plan communicated with patient and/or discharge caregiver: Yes    Date 2nd  IMM letter given: 5/19  Outpatient follow-up.  Transport at discharge: Family-Son   Andrew OmalleyBS

## 2025-05-19 NOTE — DISCHARGE INSTRUCTIONS
HOSPITALIST DISCHARGE INSTRUCTIONS  NAME:  Lara Antonio   :  1940   MRN:  [id number]     Date/Time:  2025 10:58 AM    ADMIT DATE: 2025     DISCHARGE DATE: 2025     DISCHARGE DIAGNOSIS:  Colitis    DISCHARGE INSTRUCTIONS:  Thank you for allowing us to participate in your care. Your discharging Hospitalist is Vitaliy Delgado MD. You were admitted for evaluation and treatment of the above. You will need to complete several more days of antibiotics as have been prescribed.       MEDICATIONS:    It is important that you take the medication exactly as they are prescribed.   Keep your medication in the bottles provided by the pharmacist and keep a list of the medication names, dosages, and times to be taken in your wallet.   Do not take other medications without consulting your doctor.             If you experience any of the following symptoms then please call your primary care physician or return to the emergency room if you cannot get hold of your doctor:  Fever, chills, nausea, vomiting, diarrhea, change in mentation, falling, bleeding, shortness of breath    Follow Up:  Please call the below provider to arrange hospital follow up appointment      [facility]  [phone]  Reference # [id number]  Call  As needed    [place] [address]  [phone]  Call  Call agency if you've not heard from them 24-48 hours after discharge.    Melissa Ramírez APRN - CNP  [address] [facility] [address]  [facility] [id number] [phone]    Follow up      Melissa Ramírez APRN - CNP  [address] [facility] [address]  [facility] [address]  [phone]            For questions regarding your Hospitalization or to contact the Hospital Medicine team, please call [phone].      Information obtained by :  I understand that if any problems occur once I am at home I am to contact my physician.    I understand and acknowledge receipt of the

## 2025-05-19 NOTE — DISCHARGE SUMMARY
Hospitalist Discharge Summary     Patient ID:  Lara Antonio  [id number]  84 y.o.  1940    Admit date: 5/14/2025    Discharge date and time: 5/19/2025    Admission Diagnoses: Diarrhea of presumed infectious origin [R19.7]  Hypomagnesemia [E83.42]  Colitis [K52.9]  Acute colitis [K52.9]    Discharge Diagnoses:    Principal Problem:    Acute colitis  Active Problems:    Nausea vomiting and diarrhea    Hypomagnesemia  Resolved Problems:    * No resolved hospital problems. *         Hospital Course:     Acute colitis  - Continue empiric Levaquin/Flagyl for 3 more days  - Stool studies, C. difficile both negative  - Added imodium       Dehydration  Hypomagnesemia  Hypokalemia  - Suspect due to diarrhea due to the above  - Improving; stop IVF     Hypertension       Type 2 diabetes  - Recent A1c was 6.2  - Continue SSI per protocol     History of TIA  - Continue aspirin    Imaging  CT ABDOMEN PELVIS W IV CONTRAST Additional Contrast? None  Result Date: 5/14/2025  Mild wall thickening of the rectosigmoid colon suggestive of a nonspecific colitis. Electronically signed by Willian Moyer       PCP: Melissa Ramírez, ELVIS - CNP     Consults: none    Condition of patient at discharge: Good and Improved    Discharge Exam:    Physical Exam:    Gen: Well-developed, well-nourished, in no acute distress  HEENT:  Pink conjunctivae, PERRL, hearing intact to voice, moist mucous membranes  Neck: Supple, without masses, thyroid non-tender  Resp: No accessory muscle use, clear breath sounds without wheezes rales or rhonchi  Card: No murmurs, normal S1, S2 without thrills, bruits or peripheral edema  Abd:  Soft, non-tender, non-distended, normoactive bowel sounds are present, no palpable organomegaly and no detectable hernias  Lymph:  No cervical or inguinal adenopathy  Musc: No cyanosis or clubbing  Skin: No rashes or ulcers, skin turgor is good  Neuro:  Cranial nerves are grossly intact, no focal motor weakness, follows commands

## 2025-05-20 ENCOUNTER — TELEPHONE (OUTPATIENT)
Facility: CLINIC | Age: 85
End: 2025-05-20

## 2025-05-20 NOTE — TELEPHONE ENCOUNTER
LM for the patient to call us back to schedule a hospital follow up. Original message in basket.     5.21.2025 Spoke with patient and scheduled her a hospital follow up with RODY MURGUIA on 05/30/2025 at 1pm.

## 2025-05-22 NOTE — TELEPHONE ENCOUNTER
Medication Refill(s) via fax for:  atenolol (TENORMIN) 25 MG tablet [[id number]]    Order Details  Dose, Route, Frequency: As Directed   Dispense Quantity: 90 tablet Refills: 1          Sig: take 1 tablet by mouth once daily     send to pharmacy:  [id number] - [facility] [address] [facility] [phone] -  [phone]     next scheduled Appt for:  5/30/2025 1:00 PM Melissa Ramírez, ELVIS - CNP     Please advise accordingly

## 2025-05-23 RX ORDER — ATENOLOL 25 MG/1
25 TABLET ORAL DAILY
Qty: 90 TABLET | Refills: 1 | Status: SHIPPED | OUTPATIENT
Start: 2025-05-23

## 2025-05-28 ENCOUNTER — TELEPHONE (OUTPATIENT)
Facility: CLINIC | Age: 85
End: 2025-05-28

## 2025-05-28 NOTE — TELEPHONE ENCOUNTER
FYI: Glen, from [facility], called to let us know that they received an order to start home health. Patient has requested that it be started later in the week. Best contact number for Glen is [phone].

## 2025-05-30 ENCOUNTER — OFFICE VISIT (OUTPATIENT)
Facility: CLINIC | Age: 85
End: 2025-05-30

## 2025-05-30 VITALS
WEIGHT: 134 LBS | RESPIRATION RATE: 18 BRPM | TEMPERATURE: 97.2 F | BODY MASS INDEX: 25.3 KG/M2 | DIASTOLIC BLOOD PRESSURE: 56 MMHG | HEART RATE: 69 BPM | SYSTOLIC BLOOD PRESSURE: 108 MMHG | OXYGEN SATURATION: 98 % | HEIGHT: 61 IN

## 2025-05-30 DIAGNOSIS — Z09 HOSPITAL DISCHARGE FOLLOW-UP: Primary | ICD-10-CM

## 2025-05-30 DIAGNOSIS — E03.9 HYPOTHYROIDISM, UNSPECIFIED TYPE: ICD-10-CM

## 2025-05-30 DIAGNOSIS — R14.0 FLATULENCE/GAS PAIN/BELCHING: ICD-10-CM

## 2025-05-30 DIAGNOSIS — Z91.81 AT HIGH RISK FOR FALLS: ICD-10-CM

## 2025-05-30 DIAGNOSIS — E78.5 HYPERLIPIDEMIA, UNSPECIFIED HYPERLIPIDEMIA TYPE: ICD-10-CM

## 2025-05-30 RX ORDER — ALLOPURINOL 100 MG/1
100 TABLET ORAL DAILY
Qty: 90 TABLET | Refills: 1 | Status: CANCELLED | OUTPATIENT
Start: 2025-05-30

## 2025-05-30 NOTE — PROGRESS NOTES
The patient, Lara Antonio, identity was verified by name and MRN.  Chief Complaint   Patient presents with    Follow-Up from Hospital     Dx with colitis.  Has only taken imodium 2x since being home. Stool has been brownish color    Gout     Toe on R leg possible? Ankles have been swollen since hospital     No LMP recorded (lmp unknown). Patient is postmenopausal.  Temp 97.2 °F (36.2 °C) (Temporal)   Ht 1.549 m (5' 1\")   Wt 60.8 kg (134 lb)   LMP  (LMP Unknown)   BMI 25.32 kg/m²       4/24/2025     1:44 PM   Amb Fall Risk Assessment and TUG Test   Do you feel unsteady or are you worried about falling?  no    2 or more falls in past year? no    Fall with injury in past year? no        Proxy-reported     No data recorded  \"Have you been to the ER, urgent care clinic since your last visit?  Hospitalized since your last visit?\"    NO    “Have you seen or consulted any other health care providers outside our system since your last visit?”    NO             Social History     Substance and Sexual Activity   Sexual Activity Not Currently     Medication list reviewed and active medications noted. Patient is taking medications as directed.  See documentation in medication activity.  Allergies: allergy list reviewed, no new allergies added        No questionnaires available.                           Nadege Cartwright LPN          
Neuropathy    At high risk for falls    Nonintractable headache    Peripheral neuralgia    Incontinence of feces with fecal urgency    Sensorineural hearing loss (SNHL) of both ears    Neoplasm of palate    Acute otalgia, left    Unsteady gait    History of stroke    Ventral hernia without obstruction or gangrene    Sciatica of left side    Acute colitis    Nausea vomiting and diarrhea    Hypomagnesemia       Medications listed as ordered at the time of discharge from hospital     Medication List            Accurate as of May 30, 2025  1:31 PM. If you have any questions, ask your nurse or doctor.                CHANGE how you take these medications      Veltassa 8.4 g Pack packet  Generic drug: patiromer sorbitex calcium  What changed: See the new instructions.            CONTINUE taking these medications      allopurinol 100 MG tablet  Commonly known as: ZYLOPRIM  Take 1 tablet by mouth daily     aspirin 81 MG EC tablet     atenolol 25 MG tablet  Commonly known as: TENORMIN  Take 1 tablet by mouth daily     clobetasol 0.05 % ointment  Commonly known as: TEMOVATE     coenzyme Q10 100 MG Caps capsule     diphenhydrAMINE 25 MG tablet  Commonly known as: BENADRYL     fish oil 1000 MG capsule     IMODIUM PO     Januvia 50 MG tablet  Generic drug: SITagliptin  take 1 tablet by mouth daily     Lancets Misc  1 each by In Vitro route daily E11.9     latanoprost 0.005 % ophthalmic solution  Commonly known as: XALATAN     levothyroxine 50 MCG tablet  Commonly known as: SYNTHROID  take 1 tablet by mouth EVERY MORNING BEFORE BREAKFAST     lidocaine 5 %  Commonly known as: LIDODERM     meclizine 25 MG Chew  Commonly known as: ANTIVERT     * MULTIVITAMIN ADULT EXTRA C PO     * PRESERVISION AREDS 2 PO     Pepcid Complete -165 MG Chew  Generic drug: Famotidine-Ca Carb-Mag Hydrox     simvastatin 10 MG tablet  Commonly known as: ZOCOR  take 1 tablet by mouth at bedtime     True Metrix Blood Glucose Test strip  Generic drug:

## 2025-06-05 RX ORDER — ALLOPURINOL 100 MG/1
100 TABLET ORAL DAILY
Qty: 90 TABLET | Refills: 1 | Status: SHIPPED | OUTPATIENT
Start: 2025-06-05

## 2025-06-05 NOTE — TELEPHONE ENCOUNTER
Patient called in requesting a refill on her gout medication. She did not know the name of it, but she said at her last appt with NP EW she would refill it for her. She would like for it to be sent to the [facility] [address].

## 2025-06-06 RX ORDER — FAMOTIDINE, CALCIUM CARBONATE, AND MAGNESIUM HYDROXIDE 10; 800; 165 MG/1; MG/1; MG/1
1 TABLET, CHEWABLE ORAL 2 TIMES DAILY PRN
Qty: 60 TABLET | Refills: 1 | Status: SHIPPED | OUTPATIENT
Start: 2025-06-06

## 2025-06-06 RX ORDER — SIMVASTATIN 10 MG
TABLET ORAL
Qty: 90 TABLET | Refills: 1 | Status: SHIPPED | OUTPATIENT
Start: 2025-06-06

## 2025-06-06 RX ORDER — LEVOTHYROXINE SODIUM 50 UG/1
50 TABLET ORAL
Qty: 90 TABLET | Refills: 2 | Status: SHIPPED | OUTPATIENT
Start: 2025-06-06

## 2025-06-11 ENCOUNTER — OFFICE VISIT (OUTPATIENT)
Age: 85
End: 2025-06-11
Payer: MEDICARE

## 2025-06-11 VITALS
BODY MASS INDEX: 25.32 KG/M2 | OXYGEN SATURATION: 97 % | HEIGHT: 61 IN | SYSTOLIC BLOOD PRESSURE: 118 MMHG | DIASTOLIC BLOOD PRESSURE: 80 MMHG | HEART RATE: 76 BPM

## 2025-06-11 DIAGNOSIS — M27.0 TORUS PALATINUS: Primary | ICD-10-CM

## 2025-06-11 PROCEDURE — 1090F PRES/ABSN URINE INCON ASSESS: CPT | Performed by: OTOLARYNGOLOGY

## 2025-06-11 PROCEDURE — G8419 CALC BMI OUT NRM PARAM NOF/U: HCPCS | Performed by: OTOLARYNGOLOGY

## 2025-06-11 PROCEDURE — 1126F AMNT PAIN NOTED NONE PRSNT: CPT | Performed by: OTOLARYNGOLOGY

## 2025-06-11 PROCEDURE — 1123F ACP DISCUSS/DSCN MKR DOCD: CPT | Performed by: OTOLARYNGOLOGY

## 2025-06-11 PROCEDURE — G8427 DOCREV CUR MEDS BY ELIG CLIN: HCPCS | Performed by: OTOLARYNGOLOGY

## 2025-06-11 PROCEDURE — 1159F MED LIST DOCD IN RCRD: CPT | Performed by: OTOLARYNGOLOGY

## 2025-06-11 PROCEDURE — 99213 OFFICE O/P EST LOW 20 MIN: CPT | Performed by: OTOLARYNGOLOGY

## 2025-06-11 PROCEDURE — 1111F DSCHRG MED/CURRENT MED MERGE: CPT | Performed by: OTOLARYNGOLOGY

## 2025-06-11 PROCEDURE — 1036F TOBACCO NON-USER: CPT | Performed by: OTOLARYNGOLOGY

## 2025-06-11 ASSESSMENT — ENCOUNTER SYMPTOMS
CHOKING: 0
SINUS PRESSURE: 0
SHORTNESS OF BREATH: 0
BACK PAIN: 0
ABDOMINAL PAIN: 0
PHOTOPHOBIA: 0
APNEA: 0
NAUSEA: 0
WHEEZING: 0
TROUBLE SWALLOWING: 0
VOICE CHANGE: 0
SINUS PAIN: 0
COUGH: 0
VOMITING: 0
SORE THROAT: 0
EYE DISCHARGE: 0
STRIDOR: 0
EYE ITCHING: 0

## 2025-06-11 NOTE — PROGRESS NOTES
Identified pt with two pt identifiers(name and ). Reviewed record in preparation for visit and have obtained necessary documentation. All patient medications has been reviewed.  Chief Complaint   Patient presents with    Follow-up     Lesion in mouth       Vitals:    25 0856   BP: 118/80   Pulse: 76   SpO2: 97%                   Coordination of Care Questionnaire:   1) Have you been to an emergency room, urgent care, or hospitalized since your last visit?   yes       2. Have seen or consulted any other health care provider since your last visit? no        Patient is accompanied by self I have received verbal consent from Lara Antonio to discuss any/all medical information while they are present in the room.  
limits.  VESSELS: Abdominal aorta is normal in caliber.  LYMPH NODES: No morphologically abnormal lymph nodes.  PERITONEUM/RETROPERITONEUM: No pneumoperitoneum. No free fluid.  REPRODUCTIVE ORGANS: Within normal limits.  ABDOMINAL WALL: Multiple surgical clips in the anterior superior abdominal  subcutaneous fat.  BONES: Lumbar levocurvature.    Impression  Mild wall thickening of the rectosigmoid colon suggestive of a nonspecific  colitis.      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  Perfectly normal appearing exam today, patient reassured.    Actually swelling is even improved compared to last year.    No further follow-up required regarding her palate.  Cont fu with dentist, return as needed.    No orders of the defined types were placed in this encounter.     No follow-ups on file.       Thank you for referring this patient,    Chan Fernandes MD, FACS  Otolaryngology - Head & Neck Surgery  [facility]    [address] [place] [address] [address]  [place] [address] [address] [facility] [address]  [place] [address]    O -   M        The patient (or guardian, if applicable) and other individuals in attendance with the patient were advised that Artificial Intelligence will be utilized during this visit to record and process the conversation to generate a clinical note. The patient (or guardian, if applicable) and other individuals in attendance at the appointment consented to the use of AI, including the recording.

## 2025-06-26 ENCOUNTER — TRANSCRIBE ORDERS (OUTPATIENT)
Facility: HOSPITAL | Age: 85
End: 2025-06-26

## 2025-06-26 DIAGNOSIS — M54.16 RADICULOPATHY OF LUMBAR REGION: Primary | ICD-10-CM

## 2025-08-04 ENCOUNTER — TELEPHONE (OUTPATIENT)
Facility: CLINIC | Age: 85
End: 2025-08-04

## 2025-08-04 RX ORDER — SITAGLIPTIN 50 MG/1
50 TABLET, FILM COATED ORAL DAILY
Qty: 90 TABLET | Refills: 1 | Status: SHIPPED | OUTPATIENT
Start: 2025-08-04

## (undated) DEVICE — SURGICAL PROCEDURE KIT GEN LAPAROSCOPY LF

## (undated) DEVICE — OBTRTR BLDELSS 8MM DISP -- DA VINCI - SNGL USE

## (undated) DEVICE — ESOPHAGEAL/PYLORIC/COLONIC/BILIARY WIREGUIDED BALLOON DILATATION CATHETER: Brand: CRE™ PRO

## (undated) DEVICE — STERILE POLYISOPRENE POWDER-FREE SURGICAL GLOVES: Brand: PROTEXIS

## (undated) DEVICE — NEEDLE INSUF L120MM DIA2MM DISP FOR PNEUMOPERI ENDOPATH

## (undated) DEVICE — CLICKLINE SCISSORS INSERT: Brand: CLICKLINE

## (undated) DEVICE — SUTURE STRATAFIX SPRL PDS + SZ 2-0 L6IN ABSRB VLT L36MM SXPP1B409

## (undated) DEVICE — TUBING INSUF HEAT STRL 10 FT --

## (undated) DEVICE — TOWEL SURG W17XL27IN STD BLU COT NONFENESTRATED PREWASHED

## (undated) DEVICE — CORD ELECSURG BPLR 12 FT DISP [810T818750] [ADLER INSTRUMENT CO]

## (undated) DEVICE — TUBING FLTR PLUME AWAY EVAC W/ SUCT DEV DISP PUREVIEW

## (undated) DEVICE — 3M™ MEDIPORE™ H SOFT CLOTH TAPE SHORT ROLL TAPE 6INCHES X 2 YARDS 16 ROLLS/CASE 2866S: Brand: 3M™ MEDIPORE™

## (undated) DEVICE — SYR ASSEMB INFL BLLN 60ML --

## (undated) DEVICE — ELECTRO LUBE IS A SINGLE PATIENT USE DEVICE THAT IS INTENDED TO BE USED ON ELECTROSURGICAL ELECTRODES TO REDUCE STICKING.: Brand: KEY SURGICAL ELECTRO LUBE

## (undated) DEVICE — DEVICE TRNSF SPIK STL 2008S] MICROTEK MEDICAL INC]

## (undated) DEVICE — PAD 05IN BASE 3IN PEAK M DENS CONVOLUTED FOAM

## (undated) DEVICE — BINDER ABD M/L H12IN FOR 46-62IN WHT 4 SLD PNL DSGN HOOP

## (undated) DEVICE — CATH IV AUTOGRD BC BLU 22GA 25 -- INSYTE

## (undated) DEVICE — KIT COLON W/ 1.1OZ LUB AND 2 END

## (undated) DEVICE — (D)PREP SKN CHLRAPRP APPL 26ML -- CONVERT TO ITEM 371833

## (undated) DEVICE — SOL IRRIGATION INJ NACL 0.9% 500ML BTL

## (undated) DEVICE — INFECTION CONTROL KIT SYS

## (undated) DEVICE — NDL PRT INJ NSAF BLNT 18GX1.5 --

## (undated) DEVICE — DEVON™ KNEE AND BODY STRAP 60" X 3" (1.5 M X 7.6 CM): Brand: DEVON

## (undated) DEVICE — DRAPE SURG EQUIP W105XH13XL20IN 3 ARM DISPOSABLE DA VINCI S

## (undated) DEVICE — TELFA NON-ADHERENT ABSORBENT DRESSING: Brand: TELFA

## (undated) DEVICE — SUTURE MCRYL SZ 4-0 L27IN ABSRB UD L19MM PS-2 1/2 CIR PRIM Y426H

## (undated) DEVICE — CATH IV AUTOGRD BC PNK 20GA 25 -- INSYTE

## (undated) DEVICE — (D)STRIP SKN CLSR 0.5X4IN WHT --

## (undated) DEVICE — Device

## (undated) DEVICE — SYR 3ML LL TIP 1/10ML GRAD --

## (undated) DEVICE — SUTURE SZ 0 27IN 5/8 CIR UR-6  TAPER PT VIOLET ABSRB VICRYL J603H

## (undated) DEVICE — 1200 GUARD II KIT W/5MM TUBE W/O VAC TUBE: Brand: GUARDIAN

## (undated) DEVICE — 3M™ IOBAN™ 2 ANTIMICROBIAL INCISE DRAPE 6650EZ: Brand: IOBAN™ 2

## (undated) DEVICE — COVER LT HNDL BLU PLAS

## (undated) DEVICE — ELECTRODE,RADIOTRANSLUCENT,FOAM,3PK: Brand: MEDLINE

## (undated) DEVICE — SOLIDIFIER MEDC 1200ML -- CONVERT TO 356117

## (undated) DEVICE — BITEBLOCK ENDOSCP 60FR MAXI WHT POLYETH STURDY W/ VELC WVN

## (undated) DEVICE — 3M™ TEGADERM™ TRANSPARENT FILM DRESSING FRAME STYLE, 1624W, 2-3/8 IN X 2-3/4 IN (6 CM X 7 CM), 100/CT 4CT/CASE: Brand: 3M™ TEGADERM™

## (undated) DEVICE — DRAPE,REIN 53X77,STERILE: Brand: MEDLINE

## (undated) DEVICE — CANNULA CUSH AD W/ 14FT TBG

## (undated) DEVICE — VISUALIZATION SYSTEM: Brand: CLEARIFY

## (undated) DEVICE — NEEDLE HYPO 22GA L1.5IN BLK S STL HUB POLYPR SHLD REG BVL

## (undated) DEVICE — REM POLYHESIVE ADULT PATIENT RETURN ELECTRODE: Brand: VALLEYLAB

## (undated) DEVICE — ADULT SPO2 SENSOR: Brand: NELLCOR

## (undated) DEVICE — KENDALL SCD EXPRESS SLEEVES, KNEE LENGTH, MEDIUM: Brand: KENDALL SCD

## (undated) DEVICE — SET ADMIN 16ML TBNG L100IN 2 Y INJ SITE IV PIGGY BK DISP

## (undated) DEVICE — COVER,TABLE,60X90,STERILE: Brand: MEDLINE

## (undated) DEVICE — TIP COVER ACCESSORY

## (undated) DEVICE — BLADELESS OPTICAL TROCAR WITH FIXATION CANNULA: Brand: VERSAPORT

## (undated) DEVICE — SUTURE ABSRB L30CM 2-0 VLT SPRL PDS + STRATAFIX SXPP1B410

## (undated) DEVICE — Z DISCONTINUED NO SUB IDED BITE BLOCK ENDOSCP PEDIATRIC 38 FR SLD POLYETH BLU BLOX